# Patient Record
Sex: MALE | Race: WHITE | NOT HISPANIC OR LATINO | Employment: OTHER | URBAN - METROPOLITAN AREA
[De-identification: names, ages, dates, MRNs, and addresses within clinical notes are randomized per-mention and may not be internally consistent; named-entity substitution may affect disease eponyms.]

---

## 2021-01-10 ENCOUNTER — APPOINTMENT (OUTPATIENT)
Dept: GENERAL RADIOLOGY | Facility: HOSPITAL | Age: 66
End: 2021-01-10

## 2021-01-10 ENCOUNTER — APPOINTMENT (OUTPATIENT)
Dept: CT IMAGING | Facility: HOSPITAL | Age: 66
End: 2021-01-10

## 2021-01-10 ENCOUNTER — HOSPITAL ENCOUNTER (INPATIENT)
Facility: HOSPITAL | Age: 66
LOS: 5 days | Discharge: HOME HEALTH | End: 2021-01-15
Attending: EMERGENCY MEDICINE | Admitting: HOSPITALIST

## 2021-01-10 DIAGNOSIS — J90 PLEURAL EFFUSION: ICD-10-CM

## 2021-01-10 DIAGNOSIS — S72.001A CLOSED FRACTURE OF RIGHT HIP, INITIAL ENCOUNTER (HCC): ICD-10-CM

## 2021-01-10 DIAGNOSIS — F10.10 ALCOHOL ABUSE: ICD-10-CM

## 2021-01-10 DIAGNOSIS — S72.009A CLOSED FRACTURE OF HIP, UNSPECIFIED LATERALITY, INITIAL ENCOUNTER (HCC): Primary | ICD-10-CM

## 2021-01-10 LAB
ALBUMIN SERPL-MCNC: 3 G/DL (ref 3.5–5.2)
ALBUMIN/GLOB SERPL: 0.7 G/DL
ALP SERPL-CCNC: 114 U/L (ref 39–117)
ALT SERPL W P-5'-P-CCNC: 20 U/L (ref 1–41)
ANION GAP SERPL CALCULATED.3IONS-SCNC: 12 MMOL/L (ref 5–15)
APAP SERPL-MCNC: <5 MCG/ML (ref 0–30)
AST SERPL-CCNC: 51 U/L (ref 1–40)
BACTERIA UR QL AUTO: ABNORMAL /HPF
BASOPHILS # BLD AUTO: 0.1 10*3/MM3 (ref 0–0.2)
BASOPHILS NFR BLD AUTO: 0.8 % (ref 0–1.5)
BILIRUB SERPL-MCNC: 2.6 MG/DL (ref 0–1.2)
BILIRUB UR QL STRIP: NEGATIVE
BUN SERPL-MCNC: 7 MG/DL (ref 8–23)
BUN/CREAT SERPL: 14.9 (ref 7–25)
CALCIUM SPEC-SCNC: 8.4 MG/DL (ref 8.6–10.5)
CHLORIDE SERPL-SCNC: 100 MMOL/L (ref 98–107)
CLARITY UR: ABNORMAL
CO2 SERPL-SCNC: 22 MMOL/L (ref 22–29)
COLOR UR: ABNORMAL
CREAT SERPL-MCNC: 0.47 MG/DL (ref 0.76–1.27)
DEPRECATED RDW RBC AUTO: 53.8 FL (ref 37–54)
EOSINOPHIL # BLD AUTO: 0.1 10*3/MM3 (ref 0–0.4)
EOSINOPHIL NFR BLD AUTO: 1.6 % (ref 0.3–6.2)
ERYTHROCYTE [DISTWIDTH] IN BLOOD BY AUTOMATED COUNT: 15.1 % (ref 12.3–15.4)
ETHANOL UR QL: 0.1 %
GFR SERPL CREATININE-BSD FRML MDRD: >150 ML/MIN/1.73
GLOBULIN UR ELPH-MCNC: 4.5 GM/DL
GLUCOSE SERPL-MCNC: 110 MG/DL (ref 65–99)
GLUCOSE UR STRIP-MCNC: NEGATIVE MG/DL
HCT VFR BLD AUTO: 37.8 % (ref 37.5–51)
HGB BLD-MCNC: 12.8 G/DL (ref 13–17.7)
HGB UR QL STRIP.AUTO: ABNORMAL
HYALINE CASTS UR QL AUTO: ABNORMAL /LPF
KETONES UR QL STRIP: NEGATIVE
LEUKOCYTE ESTERASE UR QL STRIP.AUTO: NEGATIVE
LYMPHOCYTES # BLD AUTO: 0.7 10*3/MM3 (ref 0.7–3.1)
LYMPHOCYTES NFR BLD AUTO: 8.5 % (ref 19.6–45.3)
MCH RBC QN AUTO: 34.7 PG (ref 26.6–33)
MCHC RBC AUTO-ENTMCNC: 33.8 G/DL (ref 31.5–35.7)
MCV RBC AUTO: 102.8 FL (ref 79–97)
MONOCYTES # BLD AUTO: 0.8 10*3/MM3 (ref 0.1–0.9)
MONOCYTES NFR BLD AUTO: 9.5 % (ref 5–12)
MUCOUS THREADS URNS QL MICRO: ABNORMAL /HPF
NEUTROPHILS NFR BLD AUTO: 6.3 10*3/MM3 (ref 1.7–7)
NEUTROPHILS NFR BLD AUTO: 79.6 % (ref 42.7–76)
NITRITE UR QL STRIP: NEGATIVE
NRBC BLD AUTO-RTO: 0.1 /100 WBC (ref 0–0.2)
PH UR STRIP.AUTO: 6 [PH] (ref 5–8)
PLATELET # BLD AUTO: 201 10*3/MM3 (ref 140–450)
PMV BLD AUTO: 7 FL (ref 6–12)
POTASSIUM SERPL-SCNC: 3.5 MMOL/L (ref 3.5–5.2)
PROT SERPL-MCNC: 7.5 G/DL (ref 6–8.5)
PROT UR QL STRIP: NEGATIVE
RBC # BLD AUTO: 3.68 10*6/MM3 (ref 4.14–5.8)
RBC # UR: ABNORMAL /HPF
REF LAB TEST METHOD: ABNORMAL
SALICYLATES SERPL-MCNC: <0.3 MG/DL
SARS-COV-2 RNA PNL SPEC NAA+PROBE: NOT DETECTED
SODIUM SERPL-SCNC: 134 MMOL/L (ref 136–145)
SP GR UR STRIP: 1.01 (ref 1–1.03)
SQUAMOUS #/AREA URNS HPF: ABNORMAL /HPF
TROPONIN T SERPL-MCNC: <0.01 NG/ML (ref 0–0.03)
UROBILINOGEN UR QL STRIP: ABNORMAL
WBC # BLD AUTO: 7.9 10*3/MM3 (ref 3.4–10.8)
WBC UR QL AUTO: ABNORMAL /HPF

## 2021-01-10 PROCEDURE — 85025 COMPLETE CBC W/AUTO DIFF WBC: CPT | Performed by: EMERGENCY MEDICINE

## 2021-01-10 PROCEDURE — 81001 URINALYSIS AUTO W/SCOPE: CPT | Performed by: EMERGENCY MEDICINE

## 2021-01-10 PROCEDURE — 82077 ASSAY SPEC XCP UR&BREATH IA: CPT | Performed by: EMERGENCY MEDICINE

## 2021-01-10 PROCEDURE — 73502 X-RAY EXAM HIP UNI 2-3 VIEWS: CPT

## 2021-01-10 PROCEDURE — 74176 CT ABD & PELVIS W/O CONTRAST: CPT

## 2021-01-10 PROCEDURE — 99222 1ST HOSP IP/OBS MODERATE 55: CPT | Performed by: HOSPITALIST

## 2021-01-10 PROCEDURE — 80143 DRUG ASSAY ACETAMINOPHEN: CPT | Performed by: EMERGENCY MEDICINE

## 2021-01-10 PROCEDURE — 70450 CT HEAD/BRAIN W/O DYE: CPT

## 2021-01-10 PROCEDURE — 80179 DRUG ASSAY SALICYLATE: CPT | Performed by: EMERGENCY MEDICINE

## 2021-01-10 PROCEDURE — 71045 X-RAY EXAM CHEST 1 VIEW: CPT

## 2021-01-10 PROCEDURE — 80307 DRUG TEST PRSMV CHEM ANLYZR: CPT | Performed by: EMERGENCY MEDICINE

## 2021-01-10 PROCEDURE — 84484 ASSAY OF TROPONIN QUANT: CPT | Performed by: EMERGENCY MEDICINE

## 2021-01-10 PROCEDURE — U0003 INFECTIOUS AGENT DETECTION BY NUCLEIC ACID (DNA OR RNA); SEVERE ACUTE RESPIRATORY SYNDROME CORONAVIRUS 2 (SARS-COV-2) (CORONAVIRUS DISEASE [COVID-19]), AMPLIFIED PROBE TECHNIQUE, MAKING USE OF HIGH THROUGHPUT TECHNOLOGIES AS DESCRIBED BY CMS-2020-01-R: HCPCS | Performed by: EMERGENCY MEDICINE

## 2021-01-10 PROCEDURE — 80053 COMPREHEN METABOLIC PANEL: CPT | Performed by: EMERGENCY MEDICINE

## 2021-01-10 PROCEDURE — 99284 EMERGENCY DEPT VISIT MOD MDM: CPT

## 2021-01-10 PROCEDURE — 25010000002 HYDROMORPHONE PER 4 MG: Performed by: HOSPITALIST

## 2021-01-10 PROCEDURE — 72125 CT NECK SPINE W/O DYE: CPT

## 2021-01-10 RX ORDER — SODIUM CHLORIDE 0.9 % (FLUSH) 0.9 %
3-10 SYRINGE (ML) INJECTION AS NEEDED
Status: DISCONTINUED | OUTPATIENT
Start: 2021-01-10 | End: 2021-01-15 | Stop reason: HOSPADM

## 2021-01-10 RX ORDER — HYDROMORPHONE HCL 110MG/55ML
0.5 PATIENT CONTROLLED ANALGESIA SYRINGE INTRAVENOUS
Status: DISCONTINUED | OUTPATIENT
Start: 2021-01-10 | End: 2021-01-15 | Stop reason: HOSPADM

## 2021-01-10 RX ORDER — HYDROCODONE BITARTRATE AND ACETAMINOPHEN 10; 325 MG/1; MG/1
1 TABLET ORAL EVERY 4 HOURS PRN
Status: DISCONTINUED | OUTPATIENT
Start: 2021-01-10 | End: 2021-01-12 | Stop reason: SDUPTHER

## 2021-01-10 RX ORDER — SODIUM CHLORIDE 0.9 % (FLUSH) 0.9 %
3 SYRINGE (ML) INJECTION EVERY 12 HOURS SCHEDULED
Status: DISCONTINUED | OUTPATIENT
Start: 2021-01-10 | End: 2021-01-15 | Stop reason: HOSPADM

## 2021-01-10 RX ORDER — SODIUM CHLORIDE 0.9 % (FLUSH) 0.9 %
10 SYRINGE (ML) INJECTION AS NEEDED
Status: DISCONTINUED | OUTPATIENT
Start: 2021-01-10 | End: 2021-01-15 | Stop reason: HOSPADM

## 2021-01-10 RX ORDER — ONDANSETRON 2 MG/ML
4 INJECTION INTRAMUSCULAR; INTRAVENOUS EVERY 6 HOURS PRN
Status: DISCONTINUED | OUTPATIENT
Start: 2021-01-10 | End: 2021-01-15 | Stop reason: HOSPADM

## 2021-01-10 RX ORDER — ONDANSETRON 4 MG/1
4 TABLET, FILM COATED ORAL EVERY 6 HOURS PRN
Status: DISCONTINUED | OUTPATIENT
Start: 2021-01-10 | End: 2021-01-15 | Stop reason: HOSPADM

## 2021-01-10 RX ORDER — NITROGLYCERIN 0.4 MG/1
0.4 TABLET SUBLINGUAL
Status: DISCONTINUED | OUTPATIENT
Start: 2021-01-10 | End: 2021-01-15 | Stop reason: HOSPADM

## 2021-01-10 RX ADMIN — Medication 3 ML: at 20:02

## 2021-01-10 RX ADMIN — HYDROCODONE BITARTRATE AND ACETAMINOPHEN 1 TABLET: 10; 325 TABLET ORAL at 23:12

## 2021-01-10 RX ADMIN — HYDROMORPHONE HYDROCHLORIDE 0.5 MG: 2 INJECTION, SOLUTION INTRAMUSCULAR; INTRAVENOUS; SUBCUTANEOUS at 18:59

## 2021-01-10 NOTE — ED PROVIDER NOTES
"Subjective   Chief complaint: Patient is a pleasant 65-year-old.  He states he does not drink every day but did today he had a \"bucket of beer\".  He states he tripped over the air conditioning and landed on his hip.  States that he has back pain as well as hip pain however he \"always has back pain\".  Denies loss of consciousness.  Came in by ambulance.  Could not stand up at home.  Secondary to the pain in his hip.  He denies chest pain he denies shortness of breath.  He denies abdominal pain.    Context: Alcohol involved at home incident    Duration: Just prior to arrival    Timing: Persistent    Severity: Patient notes severe pain when he tries to move his head    Associated Symptoms: Negative except as noted above.  Appropriate PPE was used.        PCP:            Review of Systems   Constitutional: Negative.    HENT: Negative.    Eyes: Negative.    Respiratory: Negative.    Cardiovascular: Negative.    Gastrointestinal: Negative.    Genitourinary: Positive for flank pain.   Musculoskeletal: Positive for back pain.   Skin: Negative.    Neurological: Negative.    Psychiatric/Behavioral: Negative.        No past medical history on file.    Allergies   Allergen Reactions   • Sulfa Antibiotics Unknown - High Severity       No past surgical history on file.    No family history on file.             Objective   Physical Exam  Vitals signs and nursing note reviewed.   HENT:      Head: Normocephalic and atraumatic.   Eyes:      Extraocular Movements: Extraocular movements intact.      Pupils: Pupils are equal, round, and reactive to light.   Neck:      Comments: Alcohol on board.  Fails Nexus  Cardiovascular:      Rate and Rhythm: Normal rate and regular rhythm.      Pulses: Normal pulses.      Heart sounds: Normal heart sounds.   Abdominal:      Tenderness: There is abdominal tenderness.       Musculoskeletal:      Right hip: He exhibits decreased range of motion, tenderness and bony tenderness. He exhibits no swelling. "        Legs:    Skin:     General: Skin is warm and dry.   Neurological:      General: No focal deficit present.      Mental Status: He is oriented to person, place, and time.      Cranial Nerves: No cranial nerve deficit.      Sensory: No sensory deficit.      Motor: No weakness.   Psychiatric:         Mood and Affect: Mood normal.         Behavior: Behavior normal.         Thought Content: Thought content normal.         Judgment: Judgment normal.         Procedures           ED Course      Results for orders placed or performed during the hospital encounter of 01/10/21   Comprehensive Metabolic Panel    Specimen: Blood   Result Value Ref Range    Glucose 110 (H) 65 - 99 mg/dL    BUN 7 (L) 8 - 23 mg/dL    Creatinine 0.47 (L) 0.76 - 1.27 mg/dL    Sodium 134 (L) 136 - 145 mmol/L    Potassium 3.5 3.5 - 5.2 mmol/L    Chloride 100 98 - 107 mmol/L    CO2 22.0 22.0 - 29.0 mmol/L    Calcium 8.4 (L) 8.6 - 10.5 mg/dL    Total Protein 7.5 6.0 - 8.5 g/dL    Albumin 3.00 (L) 3.50 - 5.20 g/dL    ALT (SGPT) 20 1 - 41 U/L    AST (SGOT) 51 (H) 1 - 40 U/L    Alkaline Phosphatase 114 39 - 117 U/L    Total Bilirubin 2.6 (H) 0.0 - 1.2 mg/dL    eGFR Non African Amer >150 >60 mL/min/1.73    Globulin 4.5 gm/dL    A/G Ratio 0.7 g/dL    BUN/Creatinine Ratio 14.9 7.0 - 25.0    Anion Gap 12.0 5.0 - 15.0 mmol/L   Troponin    Specimen: Blood   Result Value Ref Range    Troponin T <0.010 0.000 - 0.030 ng/mL   Ethanol    Specimen: Blood   Result Value Ref Range    Ethanol % 0.099 %   Acetaminophen Level    Specimen: Blood   Result Value Ref Range    Acetaminophen <5.0 0.0 - 30.0 mcg/mL   Salicylate Level    Specimen: Blood   Result Value Ref Range    Salicylate <0.3 <=30.0 mg/dL   CBC Auto Differential    Specimen: Blood   Result Value Ref Range    WBC 7.90 3.40 - 10.80 10*3/mm3    RBC 3.68 (L) 4.14 - 5.80 10*6/mm3    Hemoglobin 12.8 (L) 13.0 - 17.7 g/dL    Hematocrit 37.8 37.5 - 51.0 %    .8 (H) 79.0 - 97.0 fL    MCH 34.7 (H) 26.6 - 33.0  pg    MCHC 33.8 31.5 - 35.7 g/dL    RDW 15.1 12.3 - 15.4 %    RDW-SD 53.8 37.0 - 54.0 fl    MPV 7.0 6.0 - 12.0 fL    Platelets 201 140 - 450 10*3/mm3    Neutrophil % 79.6 (H) 42.7 - 76.0 %    Lymphocyte % 8.5 (L) 19.6 - 45.3 %    Monocyte % 9.5 5.0 - 12.0 %    Eosinophil % 1.6 0.3 - 6.2 %    Basophil % 0.8 0.0 - 1.5 %    Neutrophils, Absolute 6.30 1.70 - 7.00 10*3/mm3    Lymphocytes, Absolute 0.70 0.70 - 3.10 10*3/mm3    Monocytes, Absolute 0.80 0.10 - 0.90 10*3/mm3    Eosinophils, Absolute 0.10 0.00 - 0.40 10*3/mm3    Basophils, Absolute 0.10 0.00 - 0.20 10*3/mm3    nRBC 0.1 0.0 - 0.2 /100 WBC              Ct Abdomen Pelvis Without Contrast    Result Date: 1/10/2021   1. Diffuse abdominal ascites is noted thought secondary to cirrhosis with secondary signs of splenomegaly and portal hypertension with prominent mesenteric vessels and varices suspected. In addition there is left-sided pleural effusion 2. Subcapital fracture of the right hip 3. Incidental note is made of gallstones within the gallbladder    Electronically Signed By-Gee Davison MD On:1/10/2021 3:49 PM This report was finalized on [id number] by  Gee Davison MD.    Ct Head Without Contrast    Result Date: 1/10/2021   1. There is no acute edema hemorrhage or obvious mass effect. 2. Chronic sinusitis is noted involving the left maxillary and ethmoid sinuses. 3. Calvarium is grossly intact.   Brain MRI is more sensitive to evaluate for acute or subacute infarcts and to evaluate for intracranial metastatic disease.  Electronically Signed By-Gee Davison MD On:1/10/2021 3:38 PM This report was finalized on [id number] by  Gee Davison MD.    Ct Cervical Spine Without Contrast    Result Date: 1/10/2021   1. No obvious fracture or subluxation 2. Multilevel degenerative disc and degenerative changes are identified. 3. Incidental note is made of left pleural effusion. The cause is not readily apparent on this exam  Electronically Signed  By-Gee Davison MD On:1/10/2021 3:43 PM This report was finalized on [id number] by  Gee Davison MD.                                 MDM  Number of Diagnoses or Management Options  Diagnosis management comments: Patient presents after a fall and alcohol use.  He has subcapital right hip fracture.  Cannot bear weight on it.  At this point time I will admit him for orthopedic evaluation and hip replacement surgery.  He will be admitted to medicine.  He would be high risk for withdrawal with his alcoholism.  He also has pleural effusion ascites to be worked up and managed in the hospital.       Amount and/or Complexity of Data Reviewed  Clinical lab tests: reviewed  Tests in the radiology section of CPT®: reviewed  Discussion of test results with the performing providers: yes  Discuss the patient with other providers: yes  Independent visualization of images, tracings, or specimens: yes    Risk of Complications, Morbidity, and/or Mortality  Presenting problems: high  Management options: high    Patient Progress  Patient progress: stable      Final diagnoses:   None     Right hip fracture  Alcohol abuse  Cirrhosis  Pleural effusion       Jemal Sousa DO  01/10/21 1606

## 2021-01-11 ENCOUNTER — APPOINTMENT (OUTPATIENT)
Dept: GENERAL RADIOLOGY | Facility: HOSPITAL | Age: 66
End: 2021-01-11

## 2021-01-11 LAB
ABO GROUP BLD: NORMAL
ANION GAP SERPL CALCULATED.3IONS-SCNC: 10 MMOL/L (ref 5–15)
APTT PPP: 38.8 SECONDS (ref 24–31)
BASOPHILS # BLD AUTO: 0.1 10*3/MM3 (ref 0–0.2)
BASOPHILS NFR BLD AUTO: 0.6 % (ref 0–1.5)
BLD GP AB SCN SERPL QL: NEGATIVE
BUN SERPL-MCNC: 8 MG/DL (ref 8–23)
BUN/CREAT SERPL: 20.5 (ref 7–25)
CALCIUM SPEC-SCNC: 8.3 MG/DL (ref 8.6–10.5)
CHLORIDE SERPL-SCNC: 102 MMOL/L (ref 98–107)
CO2 SERPL-SCNC: 23 MMOL/L (ref 22–29)
CREAT SERPL-MCNC: 0.39 MG/DL (ref 0.76–1.27)
DEPRECATED RDW RBC AUTO: 52.1 FL (ref 37–54)
EOSINOPHIL # BLD AUTO: 0.2 10*3/MM3 (ref 0–0.4)
EOSINOPHIL NFR BLD AUTO: 2.6 % (ref 0.3–6.2)
ERYTHROCYTE [DISTWIDTH] IN BLOOD BY AUTOMATED COUNT: 14.8 % (ref 12.3–15.4)
GFR SERPL CREATININE-BSD FRML MDRD: >150 ML/MIN/1.73
GLUCOSE SERPL-MCNC: 84 MG/DL (ref 65–99)
HCT VFR BLD AUTO: 34.3 % (ref 37.5–51)
HGB BLD-MCNC: 11.8 G/DL (ref 13–17.7)
INR PPP: 1.54 (ref 0.93–1.1)
LYMPHOCYTES # BLD AUTO: 0.6 10*3/MM3 (ref 0.7–3.1)
LYMPHOCYTES NFR BLD AUTO: 6.7 % (ref 19.6–45.3)
MCH RBC QN AUTO: 34.4 PG (ref 26.6–33)
MCHC RBC AUTO-ENTMCNC: 34.3 G/DL (ref 31.5–35.7)
MCV RBC AUTO: 100.3 FL (ref 79–97)
MONOCYTES # BLD AUTO: 0.8 10*3/MM3 (ref 0.1–0.9)
MONOCYTES NFR BLD AUTO: 8.1 % (ref 5–12)
MRSA DNA SPEC QL NAA+PROBE: NORMAL
NEUTROPHILS NFR BLD AUTO: 7.9 10*3/MM3 (ref 1.7–7)
NEUTROPHILS NFR BLD AUTO: 82 % (ref 42.7–76)
NRBC BLD AUTO-RTO: 0.1 /100 WBC (ref 0–0.2)
PLATELET # BLD AUTO: 172 10*3/MM3 (ref 140–450)
PMV BLD AUTO: 6.8 FL (ref 6–12)
POTASSIUM SERPL-SCNC: 3.8 MMOL/L (ref 3.5–5.2)
PROTHROMBIN TIME: 16.6 SECONDS (ref 9.6–11.7)
RBC # BLD AUTO: 3.42 10*6/MM3 (ref 4.14–5.8)
RH BLD: POSITIVE
SODIUM SERPL-SCNC: 135 MMOL/L (ref 136–145)
T&S EXPIRATION DATE: NORMAL
WBC # BLD AUTO: 9.6 10*3/MM3 (ref 3.4–10.8)

## 2021-01-11 PROCEDURE — 86850 RBC ANTIBODY SCREEN: CPT | Performed by: ORTHOPAEDIC SURGERY

## 2021-01-11 PROCEDURE — 80048 BASIC METABOLIC PNL TOTAL CA: CPT | Performed by: HOSPITALIST

## 2021-01-11 PROCEDURE — 99232 SBSQ HOSP IP/OBS MODERATE 35: CPT | Performed by: HOSPITALIST

## 2021-01-11 PROCEDURE — 93010 ELECTROCARDIOGRAM REPORT: CPT | Performed by: INTERNAL MEDICINE

## 2021-01-11 PROCEDURE — 93005 ELECTROCARDIOGRAM TRACING: CPT | Performed by: ORTHOPAEDIC SURGERY

## 2021-01-11 PROCEDURE — 99223 1ST HOSP IP/OBS HIGH 75: CPT | Performed by: INTERNAL MEDICINE

## 2021-01-11 PROCEDURE — 87641 MR-STAPH DNA AMP PROBE: CPT | Performed by: ORTHOPAEDIC SURGERY

## 2021-01-11 PROCEDURE — 85025 COMPLETE CBC W/AUTO DIFF WBC: CPT | Performed by: HOSPITALIST

## 2021-01-11 PROCEDURE — 85610 PROTHROMBIN TIME: CPT | Performed by: HOSPITALIST

## 2021-01-11 PROCEDURE — 25010000002 HYDROMORPHONE PER 4 MG: Performed by: HOSPITALIST

## 2021-01-11 PROCEDURE — 85730 THROMBOPLASTIN TIME PARTIAL: CPT | Performed by: HOSPITALIST

## 2021-01-11 PROCEDURE — 86900 BLOOD TYPING SEROLOGIC ABO: CPT | Performed by: ORTHOPAEDIC SURGERY

## 2021-01-11 PROCEDURE — 86901 BLOOD TYPING SEROLOGIC RH(D): CPT

## 2021-01-11 PROCEDURE — 86900 BLOOD TYPING SEROLOGIC ABO: CPT

## 2021-01-11 PROCEDURE — 71045 X-RAY EXAM CHEST 1 VIEW: CPT

## 2021-01-11 PROCEDURE — 86901 BLOOD TYPING SEROLOGIC RH(D): CPT | Performed by: ORTHOPAEDIC SURGERY

## 2021-01-11 RX ADMIN — Medication 3 ML: at 20:18

## 2021-01-11 RX ADMIN — Medication 3 ML: at 08:33

## 2021-01-11 RX ADMIN — HYDROMORPHONE HYDROCHLORIDE 0.5 MG: 2 INJECTION, SOLUTION INTRAMUSCULAR; INTRAVENOUS; SUBCUTANEOUS at 18:11

## 2021-01-11 RX ADMIN — HYDROMORPHONE HYDROCHLORIDE 0.5 MG: 2 INJECTION, SOLUTION INTRAMUSCULAR; INTRAVENOUS; SUBCUTANEOUS at 11:02

## 2021-01-11 RX ADMIN — HYDROMORPHONE HYDROCHLORIDE 0.5 MG: 2 INJECTION, SOLUTION INTRAMUSCULAR; INTRAVENOUS; SUBCUTANEOUS at 14:34

## 2021-01-11 NOTE — PAT
Spoke with nurse on unit, Polina, informed of surgery date and time.  Patient needs an EKG, CXR, MRSA, and T&S.  Is an [facility] patient.

## 2021-01-11 NOTE — DISCHARGE PLACEMENT REQUEST
"Bereket Varner (65 y.o. Male)     Date of Birth Social Security Number Address Home Phone MRN    1955  [address] [address] [phone] [id number]    Denominational Marital Status          None Single       Admission Date Admission Type Admitting Provider Attending Provider Department, Room/Bed    1/10/21 Emergency Morales, MD Franck Morrissey Salgram, MD [facility] 3C MEDICAL INPATIENT, 355/1    Discharge Date Discharge Disposition Discharge Destination                       Attending Provider: Trevor Juárez MD    Allergies: Sulfa Antibiotics    Isolation: None   Infection: None   Code Status: CPR    Ht: 182.9 cm (72\")   Wt: 81.6 kg (179 lb 14.3 oz)    Admission Cmt: None   Principal Problem: Closed fracture of hip (CMS/[facility]) [S72.009A]                 Active Insurance as of 1/10/2021     Primary Coverage     Payor Plan Insurance Group Employer/Plan Group    MEDICARE MEDICARE A & B      Payor Plan Address Payor Plan Phone Number Payor Plan Fax Number Effective Dates    [address] [phone]  1/1/2019 - None Entered    [facility] [id number]       Subscriber Name Subscriber Birth Date Member ID       BEREKET VARNER 1955 [id number]                 Emergency Contacts      (Rel.) Home Phone Work Phone Mobile Phone    LIO NEUMANN (Daughter) [phone] -- [phone]              "

## 2021-01-11 NOTE — PLAN OF CARE
Goal Outcome Evaluation:  Plan of Care Reviewed With: patient  Progress: no change  Outcome Summary: Patient has rested throughout shift. Patient NPO since midnight for sugnorris today. Patients experienced no acute events during shift. Will continue to monitor.

## 2021-01-11 NOTE — PLAN OF CARE
Goal Outcome Evaluation:  Plan of Care Reviewed With: patient  Progress: no change  Outcome Summary: pt has had c/o pain today- see MAR for interventions. surgery tomorrow evening. no other complaints today. will continue to monitor

## 2021-01-11 NOTE — H&P
[facility] Medicine Services      Patient Name: Bereket Mckay  : 1955  MRN: [id number]  Primary Care Physician: Yair, No Known  Date of admission: 1/10/2021    Patient Care Team:  Provider, No Known as PCP - General          Subjective   History Present Illness     Chief Complaint:   Chief Complaint   Patient presents with   • Fall   right hip pain      History of present illness:  Mr. Mckay is a 65 y.o.  presents to [facility] complaining of right hip pain and on going chronic back pain s/p fall. XR in the ER revealed subcapital right hip fracture.  Patient was admitted and orthopedic surgery was consulted.       ROS   12 point review of systems was reviewed and was negative except as above.        Personal History     Past Medical History: History reviewed. No pertinent past medical history.    Surgical History:    History reviewed. No pertinent surgical history.        Family History: Mother had breast cancer and father had unknown type cancer. Otherwise pertinent FHx was reviewed and unremarkable.     Social History:  reports that he quit smoking about 11 years ago. His smokeless tobacco use includes chew. He reports current alcohol use of about 5.0 standard drinks of alcohol per week. He reports that he does not use drugs.  Pt lives alone.    Medications:  Prior to Admission medications    Not on File       Allergies:    Allergies   Allergen Reactions   • Sulfa Antibiotics Unknown - High Severity       Objective   Objective     Vital Signs  Temp:  [96.2 °F (35.7 °C)-97.5 °F (36.4 °C)] 97.5 °F (36.4 °C)  Heart Rate:  [78-95] 90  Resp:  [18] 18  BP: (124-154)/(67-87) 154/87  SpO2:  [91 %-94 %] 92 %  on   ;   Device (Oxygen Therapy): room air  Body mass index is 24.76 kg/m².    Physical Exam  Well-developed well-nourished gentleman in no acute distress sitting up in bed awake and alert; mucous membranes moist; sclerae anicteric; lungs clear to auscultation bilaterally; CV  regular rate and rhythm; abdomen soft nontender nondistended with active bowel sounds; extremities with no edema, cyanosis or calf tenderness; palpable pedal pulses bilaterally; no De La Garza catheter.    Results Review:  I have personally reviewed most recent lab results and radiology images and interpretations.    Results from last 7 days   Lab Units 01/10/21  1438   WBC 10*3/mm3 7.90   HEMOGLOBIN g/dL 12.8*   HEMATOCRIT % 37.8   PLATELETS 10*3/mm3 201     Results from last 7 days   Lab Units 01/10/21  1438   SODIUM mmol/L 134*   POTASSIUM mmol/L 3.5   CHLORIDE mmol/L 100   CO2 mmol/L 22.0   BUN mg/dL 7*   CREATININE mg/dL 0.47*   GLUCOSE mg/dL 110*   CALCIUM mg/dL 8.4*   ALT (SGPT) U/L 20   AST (SGOT) U/L 51*   TROPONIN T ng/mL <0.010     Estimated Creatinine Clearance: 106.4 mL/min (A) (by C-G formula based on SCr of 0.47 mg/dL (L)).  Brief Urine Lab Results  (Last result in the past 365 days)      Color   Clarity   Blood   Leuk Est   Nitrite   Protein   CREAT   Urine HCG        01/10/21 1633 Dark Yellow Hazy Small (1+) Negative Negative Negative               Microbiology Results (last 10 days)     Procedure Component Value - Date/Time    COVID PRE-OP / PRE-PROCEDURE SCREENING ORDER (NO ISOLATION) - Swab, Nasopharynx [[id number]]  (Normal) Collected: 01/10/21 1634    Lab Status: Final result Specimen: Swab from Nasopharynx Updated: 01/10/21 1729    Narrative:      The following orders were created for panel order COVID PRE-OP / PRE-PROCEDURE SCREENING ORDER (NO ISOLATION) - Swab, Nasopharynx.  Procedure                               Abnormality         Status                     ---------                               -----------         ------                     COVID-19,CEPHEID,COR/ELLIE...[[id number]]  Normal              Final result                 Please view results for these tests on the individual orders.    COVID-19,CEPHEID,COR/ELLIE/PAD IN-HOUSE(OR EMERGENT/ADD-ON),NP SWAB IN TRANSPORT MEDIA 3-4 HR TAT - Swab,  Nasopharynx [[id number]]  (Normal) Collected: 01/10/21 1634    Lab Status: Final result Specimen: Swab from Nasopharynx Updated: 01/10/21 1729     COVID19 Not Detected    Narrative:      Fact sheet for providers: https://www.fda.gov/media/905769/download     Fact sheet for patients: https://www.fda.gov/media/809364/download          ECG/EMG Results (most recent)     None                    Ct Abdomen Pelvis Without Contrast    Result Date: 1/10/2021   1. Diffuse abdominal ascites is noted thought secondary to cirrhosis with secondary signs of splenomegaly and portal hypertension with prominent mesenteric vessels and varices suspected. In addition there is left-sided pleural effusion 2. Subcapital fracture of the right hip 3. Incidental note is made of gallstones within the gallbladder    Electronically Signed By-Gee Davison MD On:1/10/2021 3:49 PM This report was finalized on [id number] by  Gee Davison MD.    Ct Head Without Contrast    Result Date: 1/10/2021   1. There is no acute edema hemorrhage or obvious mass effect. 2. Chronic sinusitis is noted involving the left maxillary and ethmoid sinuses. 3. Calvarium is grossly intact.   Brain MRI is more sensitive to evaluate for acute or subacute infarcts and to evaluate for intracranial metastatic disease.  Electronically Signed By-Gee Davison MD On:1/10/2021 3:38 PM This report was finalized on [id number] by  Gee Davison MD.    Ct Cervical Spine Without Contrast    Result Date: 1/10/2021   1. No obvious fracture or subluxation 2. Multilevel degenerative disc and degenerative changes are identified. 3. Incidental note is made of left pleural effusion. The cause is not readily apparent on this exam  Electronically Signed By-Gee Davison MD On:1/10/2021 3:43 PM This report was finalized on [id number] by  Gee Davison MD.    Xr Chest 1 View    Result Date: 1/10/2021   1. There is a moderate left-sided pleural effusion with  underlying chronic changes.   Electronically Signed By-Gee Davison MD On:1/10/2021 4:47 PM This report was finalized on [id number] by  Gee Davison MD.    Xr Hip With Or Without Pelvis 2 - 3 View Right    Result Date: 1/10/2021   1. Minimally displaced subcapital fracture the right hip is noted.  Electronically Signed By-Gee Davison MD On:1/10/2021 4:47 PM This report was finalized on [id number] by  Gee Davison MD.        Estimated Creatinine Clearance: 106.4 mL/min (A) (by C-G formula based on SCr of 0.47 mg/dL (L)).    Assessment/Plan   Assessment/Plan       Active Hospital Problems    Diagnosis  POA   • Closed fracture of hip (CMS/HCC) [S72.009A]  Yes      Resolved Hospital Problems   No resolved problems to display.       Assessment and plan:    Minimally displaced subcapsular fracture of the right hip status post mechanical fall  -Analgesics ordered  -Orthopedic surgery consulting    EtOH dependence  -follow CIWA protocol prn w/d symptoms    Incidental findings of cirrhosis, portal hypertension, gallstones, splenomegaly and ascites     Hyponatremia, mild, monitor      VTE Prophylaxis -   Mechanical Order History:      Ordered        01/10/21 1935  Place Venous Foot Pump  Once         01/10/21 1935  Maintain Venous Foot Pump  Once                 Pharmalogical Order History:     None          CODE STATUS:    Code Status and Medical Interventions:   Ordered at: 01/10/21 1812     Code Status:    CPR     Medical Interventions (Level of Support Prior to Arrest):    Full       This patient has been examined wearing appropriate Personal Protective Equipment. 01/10/21      I discussed the patient's findings and my recommendations with patient.      Signature: Electronically signed by Patricia Morales MD, 01/10/21, 11:50 PM JU.      [facility]ist Team

## 2021-01-11 NOTE — PROGRESS NOTES
"I was asked to see this patient for my partner Dr. Luz. Patient with R femoral neck fracture. Surgery likely to be Tuesday evening, final plan to be determined tomorrow AM.     R \"Elie\" Rogelio BUCK MD  Orthopaedic Surgery  [facility]  [phone] - [place] Office  [phone] - [place] Office    "

## 2021-01-11 NOTE — PROGRESS NOTES
"      [facility] Daily Progress Note      Hospitalist Team  LOS 1 days      Patient Care Team:  Provider, No Known as PCP - General    Patient Location: 355/1      Subjective   Subjective   Denies for any new complaint, no nausea or vomiting, no chest pain.  Chief Complaint / Subjective  Chief Complaint   Patient presents with   • Fall         Brief Synopsis of Hospital Course/HPI    Mr. Mckay is a 65 y.o.  presents to [facility] complaining of right hip pain and on going chronic back pain s/p fall. XR in the ER revealed subcapital right hip fracture.  Patient was admitted and orthopedic surgery was consulted.    Date::          ROS      Objective   Objective      Vital Signs  Temp:  [97.5 °F (36.4 °C)-98 °F (36.7 °C)] 97.8 °F (36.6 °C)  Heart Rate:  [78-96] 96  Resp:  [15-18] 16  BP: (124-154)/(67-87) 134/81  Oxygen Therapy  SpO2: 90 %  Pulse Oximetry Type: Intermittent  Device (Oxygen Therapy): room air  Flowsheet Rows      First Filed Value   Admission Height  182.9 cm (72\") Documented at 01/10/2021 1341   Admission Weight  77.1 kg (170 lb) Documented at 01/10/2021 1341        Intake & Output (last 3 days)       01/08 0701 - 01/09 0700 01/09 0701 - 01/10 0700 01/10 0701 - 01/11 0700 01/11 0701 - 01/12 0700    Urine (mL/kg/hr)   550     Total Output   550     Net   -550                 Lines, Drains & Airways    Active LDAs     Name:   Placement date:   Placement time:   Site:   Days:    Peripheral IV 01/10/21 1437 Right Forearm   01/10/21    1437    Forearm   1                  Physical Exam:    Physical Exam  Vitals signs and nursing note reviewed.   Constitutional:       General: He is not in acute distress.     Appearance: Normal appearance. He is well-developed. He is not ill-appearing, toxic-appearing or diaphoretic.   HENT:      Head: Normocephalic and atraumatic.      Right Ear: Ear canal and external ear normal.      Left Ear: Ear canal and external ear " normal.      Nose: Nose normal. No congestion or rhinorrhea.      Mouth/Throat:      Mouth: Mucous membranes are moist.      Pharynx: No oropharyngeal exudate.   Eyes:      General: No scleral icterus.        Right eye: No discharge.         Left eye: No discharge.      Extraocular Movements: Extraocular movements intact.      Conjunctiva/sclera: Conjunctivae normal.      Pupils: Pupils are equal, round, and reactive to light.   Neck:      Musculoskeletal: Normal range of motion and neck supple. No neck rigidity or muscular tenderness.      Thyroid: No thyromegaly.      Vascular: No carotid bruit or JVD.      Trachea: No tracheal deviation.   Cardiovascular:      Rate and Rhythm: Normal rate and regular rhythm.      Pulses: Normal pulses.      Heart sounds: Normal heart sounds. No murmur. No friction rub. No gallop.    Pulmonary:      Effort: Pulmonary effort is normal. No respiratory distress.      Breath sounds: Normal breath sounds. No stridor. No wheezing, rhonchi or rales.   Chest:      Chest wall: No tenderness.   Abdominal:      General: Bowel sounds are normal. There is no distension.      Palpations: Abdomen is soft. There is no mass.      Tenderness: There is no abdominal tenderness. There is no guarding or rebound.      Hernia: No hernia is present.   Musculoskeletal: Normal range of motion.         General: No swelling, tenderness, deformity or signs of injury.      Right lower leg: No edema.      Left lower leg: No edema.   Lymphadenopathy:      Cervical: No cervical adenopathy.   Skin:     General: Skin is warm and dry.      Coloration: Skin is not jaundiced or pale.      Findings: No bruising, erythema or rash.   Neurological:      General: No focal deficit present.      Mental Status: He is alert and oriented to person, place, and time. Mental status is at baseline.      Cranial Nerves: No cranial nerve deficit.      Sensory: No sensory deficit.      Motor: No weakness or abnormal muscle tone.       Coordination: Coordination normal.   Psychiatric:         Mood and Affect: Mood normal.         Behavior: Behavior normal.         Thought Content: Thought content normal.         Judgment: Judgment normal.              Wounds (last 24 hours)      LDA Wound     Row Name 01/11/21 0825 01/10/21 2302 01/1955       Wound 01/1955 Right gluteal    Wound - Properties Group Placement Date: 01/10/21  -CS Placement Time: 1954  -CS Present on Hospital Admission: Y  -CS Side: Right  -CS Location: gluteal  -CS Stage, Pressure Injury : unstageable  -CS    Wound Image  --  Images linked: 1  -CS  --    Periwound Temperature  warm  -PG  --  warm  -CS    Periwound Skin Turgor  soft  -PG  --  soft  -CS    Drainage Amount  none  -PG  --  none  -CS    Retired Wound - Properties Group Date first assessed: 01/10/21  -CS Time first assessed: 1954  -CS Present on Hospital Admission: Y  -CS Side: Right  -CS Location: gluteal  -CS      User Key  (r) = Recorded By, (t) = Taken By, (c) = Cosigned By    Initials Name Provider Type    CS Johny Givens RN Registered Nurse    PG Beverley Campbell LPN Licensed Nurse          Procedures:    Procedure(s):  TOTAL HIP ARTHROPLASTY ANTERIOR WITH HANA TABLE          Results Review:     I reviewed the patient's new clinical results.      Lab Results (last 24 hours)     Procedure Component Value Units Date/Time    Basic Metabolic Panel [[id number]]  (Abnormal) Collected: 01/11/21 0333    Specimen: Blood Updated: 01/11/21 0605     Glucose 84 mg/dL      BUN 8 mg/dL      Creatinine 0.39 mg/dL      Sodium 135 mmol/L      Potassium 3.8 mmol/L      Chloride 102 mmol/L      CO2 23.0 mmol/L      Calcium 8.3 mg/dL      eGFR Non African Amer >150 mL/min/1.73      BUN/Creatinine Ratio 20.5     Anion Gap 10.0 mmol/L     Narrative:      GFR Normal >60  Chronic Kidney Disease <60  Kidney Failure <15      Protime-INR [[id number]]  (Abnormal) Collected: 01/11/21 0333    Specimen: Blood Updated: 01/11/21 0539      Protime 16.6 Seconds      INR 1.54    aPTT [[id number]]  (Abnormal) Collected: 01/11/21 0333    Specimen: Blood Updated: 01/11/21 0539     PTT 38.8 seconds     CBC Auto Differential [[id number]]  (Abnormal) Collected: 01/11/21 0334    Specimen: Blood Updated: 01/11/21 0527     WBC 9.60 10*3/mm3      RBC 3.42 10*6/mm3      Hemoglobin 11.8 g/dL      Hematocrit 34.3 %      .3 fL      MCH 34.4 pg      MCHC 34.3 g/dL      RDW 14.8 %      RDW-SD 52.1 fl      MPV 6.8 fL      Platelets 172 10*3/mm3      Neutrophil % 82.0 %      Lymphocyte % 6.7 %      Monocyte % 8.1 %      Eosinophil % 2.6 %      Basophil % 0.6 %      Neutrophils, Absolute 7.90 10*3/mm3      Lymphocytes, Absolute 0.60 10*3/mm3      Monocytes, Absolute 0.80 10*3/mm3      Eosinophils, Absolute 0.20 10*3/mm3      Basophils, Absolute 0.10 10*3/mm3      nRBC 0.1 /100 WBC     COVID PRE-OP / PRE-PROCEDURE SCREENING ORDER (NO ISOLATION) - Swab, Nasopharynx [[id number]]  (Normal) Collected: 01/10/21 1634    Specimen: Swab from Nasopharynx Updated: 01/10/21 1729    Narrative:      The following orders were created for panel order COVID PRE-OP / PRE-PROCEDURE SCREENING ORDER (NO ISOLATION) - Swab, Nasopharynx.  Procedure                               Abnormality         Status                     ---------                               -----------         ------                     COVID-19,CEPHEID,COR/ELLIE...[[id number]]  Normal              Final result                 Please view results for these tests on the individual orders.    COVID-19,CEPHEID,COR/ELLIE/PAD IN-HOUSE(OR EMERGENT/ADD-ON),NP SWAB IN TRANSPORT MEDIA 3-4 HR TAT - Swab, Nasopharynx [[id number]]  (Normal) Collected: 01/10/21 1634    Specimen: Swab from Nasopharynx Updated: 01/10/21 1729     COVID19 Not Detected    Narrative:      Fact sheet for providers: https://www.fda.gov/media/355920/download     Fact sheet for patients: https://www.fda.gov/media/529696/download    Urinalysis, Microscopic Only  - Urine, Clean Catch [[id number]]  (Abnormal) Collected: 01/10/21 1633    Specimen: Urine, Clean Catch Updated: 01/10/21 1708     RBC, UA 3-5 /HPF      WBC, UA 0-2 /HPF      Bacteria, UA None Seen /HPF      Squamous Epithelial Cells, UA 0-2 /HPF      Hyaline Casts, UA None Seen /LPF      Mucus, UA Moderate/2+ /HPF      Methodology Manual Light Microscopy    Urinalysis With Microscopic If Indicated (No Culture) - Urine, Clean Catch [[id number]]  (Abnormal) Collected: 01/10/21 1633    Specimen: Urine, Clean Catch Updated: 01/10/21 1648     Color, UA Dark Yellow     Appearance, UA Hazy     pH, UA 6.0     Specific Gravity, UA 1.009     Glucose, UA Negative     Ketones, UA Negative     Bilirubin, UA Negative     Blood, UA Small (1+)     Protein, UA Negative     Leuk Esterase, UA Negative     Nitrite, UA Negative     Urobilinogen, UA 1.0 E.U./dL    Comprehensive Metabolic Panel [[id number]]  (Abnormal) Collected: 01/10/21 1438    Specimen: Blood Updated: 01/10/21 1517     Glucose 110 mg/dL      BUN 7 mg/dL      Creatinine 0.47 mg/dL      Sodium 134 mmol/L      Potassium 3.5 mmol/L      Comment: Slight hemolysis detected by analyzer. Results may be affected.        Chloride 100 mmol/L      CO2 22.0 mmol/L      Calcium 8.4 mg/dL      Total Protein 7.5 g/dL      Albumin 3.00 g/dL      ALT (SGPT) 20 U/L      AST (SGOT) 51 U/L      Comment: Slight hemolysis detected by analyzer. Results may be affected.        Alkaline Phosphatase 114 U/L      Total Bilirubin 2.6 mg/dL      eGFR Non African Amer >150 mL/min/1.73      Globulin 4.5 gm/dL      A/G Ratio 0.7 g/dL      BUN/Creatinine Ratio 14.9     Anion Gap 12.0 mmol/L     Narrative:      GFR Normal >60  Chronic Kidney Disease <60  Kidney Failure <15      Salicylate Level [[id number]]  (Normal) Collected: 01/10/21 1438    Specimen: Blood Updated: 01/10/21 1513     Salicylate <0.3 mg/dL     Troponin [[id number]]  (Normal) Collected: 01/10/21 1438    Specimen: Blood Updated: 01/10/21  1513     Troponin T <0.010 ng/mL     Narrative:      Troponin T Reference Range:  <= 0.03 ng/mL-   Negative for AMI  >0.03 ng/mL-     Abnormal for myocardial necrosis.  Clinicians would have to utilize clinical acumen, EKG, Troponin and serial changes to determine if it is an Acute Myocardial Infarction or myocardial injury due to an underlying chronic condition.       Results may be falsely decreased if patient taking Biotin.      Ethanol [[id number]] Collected: 01/10/21 1438    Specimen: Blood Updated: 01/10/21 1513     Ethanol % 0.099 %     Narrative:      Plasma Ethanol Clinical Symptoms:    ETOH (%)               Clinical Symptom  .01-.05              No apparent influence  .03-.12              Euphoria, Diminished judgment and attention   .09-.25              Impaired comprehension, Muscle incoordination  .18-.30              Confusion, Staggered gait, Slurred speech  .25-.40              Markedly decreased response to stimuli, unable to stand or                        walk, vomitting, sleep or stupor  .35-.50              Comatose, Anesthesia, Subnormal body temperature        Acetaminophen Level [[id number]]  (Normal) Collected: 01/10/21 1438    Specimen: Blood Updated: 01/10/21 1513     Acetaminophen <5.0 mcg/mL     Narrative:      Acetaminophen Therapeutic Range  5-20 ug/mL      Hours after ingestion            Toxic Value    4 Hours                           150 ug/mL    8 Hours                            70 ug/mL   12 Hours                            40 ug/mL   16 Hours                            20 ug/mL    These values apply to a single ingestion only.         No results found for: HGBA1C  Results from last 7 days   Lab Units 01/11/21  0333   INR  1.54*           No results found for: LIPASE  No results found for: CHOL, CHLPL, TRIG, HDL, LDL, LDLDIRECT    No results found for: INTRAOP, PREDX, FINALDX, COMDX    Microbiology Results (last 10 days)     Procedure Component Value - Date/Time    COVID PRE-OP /  PRE-PROCEDURE SCREENING ORDER (NO ISOLATION) - Swab, Nasopharynx [[id number]]  (Normal) Collected: 01/10/21 1634    Lab Status: Final result Specimen: Swab from Nasopharynx Updated: 01/10/21 1729    Narrative:      The following orders were created for panel order COVID PRE-OP / PRE-PROCEDURE SCREENING ORDER (NO ISOLATION) - Swab, Nasopharynx.  Procedure                               Abnormality         Status                     ---------                               -----------         ------                     COVID-19,CEPHEID,COR/ELLIE...[[id number]]  Normal              Final result                 Please view results for these tests on the individual orders.    COVID-19,CEPHEID,COR/ELLIE/PAD IN-HOUSE(OR EMERGENT/ADD-ON),NP SWAB IN TRANSPORT MEDIA 3-4 HR TAT - Swab, Nasopharynx [[id number]]  (Normal) Collected: 01/10/21 1634    Lab Status: Final result Specimen: Swab from Nasopharynx Updated: 01/10/21 1729     COVID19 Not Detected    Narrative:      Fact sheet for providers: https://www.fda.gov/media/108010/download     Fact sheet for patients: https://www.fda.gov/media/071891/download          ECG/EMG Results (most recent)     None                    Ct Abdomen Pelvis Without Contrast    Result Date: 1/10/2021   1. Diffuse abdominal ascites is noted thought secondary to cirrhosis with secondary signs of splenomegaly and portal hypertension with prominent mesenteric vessels and varices suspected. In addition there is left-sided pleural effusion 2. Subcapital fracture of the right hip 3. Incidental note is made of gallstones within the gallbladder    Electronically Signed By-Gee Davison MD On:1/10/2021 3:49 PM This report was finalized on [id number] by  Gee Davison MD.    Ct Head Without Contrast    Result Date: 1/10/2021   1. There is no acute edema hemorrhage or obvious mass effect. 2. Chronic sinusitis is noted involving the left maxillary and ethmoid sinuses. 3. Calvarium is grossly intact.   Brain  MRI is more sensitive to evaluate for acute or subacute infarcts and to evaluate for intracranial metastatic disease.  Electronically Signed By-Gee Davison MD On:1/10/2021 3:38 PM This report was finalized on [id number] by  Gee Davison MD.    Ct Cervical Spine Without Contrast    Result Date: 1/10/2021   1. No obvious fracture or subluxation 2. Multilevel degenerative disc and degenerative changes are identified. 3. Incidental note is made of left pleural effusion. The cause is not readily apparent on this exam  Electronically Signed By-Gee Davison MD On:1/10/2021 3:43 PM This report was finalized on [id number] by  Gee Davison MD.    Xr Chest 1 View    Result Date: 1/11/2021  Moderate size layering left pleural fluid collection with left basilar airspace disease favoring atelectasis. There is mild diffuse pulmonary edema  Electronically Signed By-Phuc Diaz MD On:1/11/2021 2:31 PM This report was finalized on [id number] by  Phuc Diaz MD.    Xr Chest 1 View    Result Date: 1/10/2021   1. There is a moderate left-sided pleural effusion with underlying chronic changes.   Electronically Signed By-Gee Davison MD On:1/10/2021 4:47 PM This report was finalized on [id number] by  Gee Davison MD.    Xr Hip With Or Without Pelvis 2 - 3 View Right    Result Date: 1/10/2021   1. Minimally displaced subcapital fracture the right hip is noted.  Electronically Signed By-Gee Davison MD On:1/10/2021 4:47 PM This report was finalized on [id number] by  Gee Davison MD.          Xrays, labs reviewed personally by physician.    Medication Review:   I have reviewed the patient's current medication list      Scheduled Meds  sodium chloride, 3 mL, Intravenous, Q12H        Meds Infusions       Meds PRN  •  HYDROcodone-acetaminophen  •  HYDROmorphone  •  nitroglycerin  •  ondansetron **OR** ondansetron  •  [COMPLETED] Insert peripheral IV **AND** sodium chloride  •  sodium  chloride        Assessment/Plan   Assessment/Plan     Active Hospital Problems    Diagnosis  POA   • **Closed fracture of hip (CMS/HCC) [S72.009A]  Yes      Resolved Hospital Problems   No resolved problems to display.       MEDICAL DECISION MAKING COMPLEXITY BY PROBLEM:     Assessment and plan:     Minimally displaced subcapsular fracture of the right hip status post mechanical fall  -Analgesics ordered  -Orthopedic surgery consulted  - Plan for surgery tomorrow noted.     EtOH dependence  -follow CIWA protocol prn w/d symptoms     Incidental findings of cirrhosis, portal hypertension, gallstones, splenomegaly and ascites      Hyponatremia, mild, monitor        VTE Prophylaxis -     VTE Prophylaxis -   Mechanical Order History:      Ordered        01/10/21 1935  Place Venous Foot Pump  Once         01/10/21 1935  Maintain Venous Foot Pump  Once                 Pharmalogical Order History:     None            Code Status -   Code Status and Medical Interventions:   Ordered at: 01/10/21 1812     Code Status:    CPR     Medical Interventions (Level of Support Prior to Arrest):    Full       This patient has been examined wearing appropriate Personal Protective Equipment and discussed with hospital infection control department. 01/11/21        Discharge Planning          Electronically signed by Trevor Juárez MD, 01/11/21, 14:49 EST.  [facility]ist Team

## 2021-01-11 NOTE — CONSULTS
"  Orthopaedic Surgery  Hip Fracture Consult Note  [phone]  Jennifer Tafoya TAYE    HPI:  Patient is a 65 y.o. white male who presents with hip pain after a fall from standing.  They presented to the ER for further workup where a right Femoral Neck Fracture was found. I was consulted for further management.     MEDICAL HISTORY  · History reviewed. No pertinent past medical history.  · History reviewed. No pertinent surgical history.  Prior to Admission medications    Not on File   ·   Allergies   Allergen Reactions   • Sulfa Antibiotics Unknown - High Severity   ·   ·   · There is no immunization history on file for this patient.  Social History     Tobacco Use   • Smoking status: Former Smoker     Quit date:      Years since quittin.0   • Smokeless tobacco: Current User     Types: Chew   Substance Use Topics   • Alcohol use: Yes     Alcohol/week: 5.0 standard drinks     Types: 5 Cans of beer per week     Comment: drinks 5 beers every     ·    Social History     Substance and Sexual Activity   Drug Use Never   ·     VITALS: /72 (BP Location: Right arm, Patient Position: Lying)   Pulse 92   Temp 98 °F (36.7 °C) (Oral)   Resp 15   Ht 182.9 cm (72\")   Wt 81.6 kg (179 lb 14.3 oz)   SpO2 92%   BMI 24.40 kg/m²  Body mass index is 24.4 kg/m².    PHYSICAL EXAM:   · CONSTITUTIONAL: No acute distress  · LUNGS: Equal chest rise, no shortness of air  · CARDIOVASCULAR: palpable peripheral pulses  · SKIN: no skin lesions in the area examined  · LYMPH: no lymphadenopathy in the area examined  · Right Lower Extremity  · Tenderness to Palpation: Tenderness to palpation at the hip  · Pulses:  Palpable DP/PT pulses  · Sensation: Sensation intact to light touch to saphenous/sural/deep peroneal/superficial peroneal/tibial nerves  · Motor: 5 out of 5 EHL/FHL/TA/GS motor complexes  · Range of Motion: Range of motion of hip deferred secondary to pain.  Positive pain with passive leg roll    RADIOLOGY REVIEW: "   Ct Abdomen Pelvis Without Contrast    Result Date: 1/10/2021   1. Diffuse abdominal ascites is noted thought secondary to cirrhosis with secondary signs of splenomegaly and portal hypertension with prominent mesenteric vessels and varices suspected. In addition there is left-sided pleural effusion 2. Subcapital fracture of the right hip 3. Incidental note is made of gallstones within the gallbladder    Electronically Signed By-Gee Davison MD On:1/10/2021 3:49 PM This report was finalized on [id number] by  Gee Davison MD.    Ct Head Without Contrast    Result Date: 1/10/2021   1. There is no acute edema hemorrhage or obvious mass effect. 2. Chronic sinusitis is noted involving the left maxillary and ethmoid sinuses. 3. Calvarium is grossly intact.   Brain MRI is more sensitive to evaluate for acute or subacute infarcts and to evaluate for intracranial metastatic disease.  Electronically Signed By-Gee Davison MD On:1/10/2021 3:38 PM This report was finalized on [id number] by  Gee Davison MD.    Ct Cervical Spine Without Contrast    Result Date: 1/10/2021   1. No obvious fracture or subluxation 2. Multilevel degenerative disc and degenerative changes are identified. 3. Incidental note is made of left pleural effusion. The cause is not readily apparent on this exam  Electronically Signed By-Gee Davison MD On:1/10/2021 3:43 PM This report was finalized on [id number] by  Gee Davison MD.    Xr Chest 1 View    Result Date: 1/10/2021   1. There is a moderate left-sided pleural effusion with underlying chronic changes.   Electronically Signed By-Gee Davison MD On:1/10/2021 4:47 PM This report was finalized on [id number] by  Gee Davison MD.    Xr Hip With Or Without Pelvis 2 - 3 View Right    Result Date: 1/10/2021   1. Minimally displaced subcapital fracture the right hip is noted.  Electronically Signed ByDixie Davison MD On:1/10/2021 4:47 PM This report was  finalized on [id number] by  Gee Davison MD.      LABS:   Recent Results (from the past 24 hour(s))   Comprehensive Metabolic Panel    Collection Time: 01/10/21  2:38 PM    Specimen: Blood   Result Value Ref Range    Glucose 110 (H) 65 - 99 mg/dL    BUN 7 (L) 8 - 23 mg/dL    Creatinine 0.47 (L) 0.76 - 1.27 mg/dL    Sodium 134 (L) 136 - 145 mmol/L    Potassium 3.5 3.5 - 5.2 mmol/L    Chloride 100 98 - 107 mmol/L    CO2 22.0 22.0 - 29.0 mmol/L    Calcium 8.4 (L) 8.6 - 10.5 mg/dL    Total Protein 7.5 6.0 - 8.5 g/dL    Albumin 3.00 (L) 3.50 - 5.20 g/dL    ALT (SGPT) 20 1 - 41 U/L    AST (SGOT) 51 (H) 1 - 40 U/L    Alkaline Phosphatase 114 39 - 117 U/L    Total Bilirubin 2.6 (H) 0.0 - 1.2 mg/dL    eGFR Non African Amer >150 >60 mL/min/1.73    Globulin 4.5 gm/dL    A/G Ratio 0.7 g/dL    BUN/Creatinine Ratio 14.9 7.0 - 25.0    Anion Gap 12.0 5.0 - 15.0 mmol/L   Troponin    Collection Time: 01/10/21  2:38 PM    Specimen: Blood   Result Value Ref Range    Troponin T <0.010 0.000 - 0.030 ng/mL   Ethanol    Collection Time: 01/10/21  2:38 PM    Specimen: Blood   Result Value Ref Range    Ethanol % 0.099 %   Acetaminophen Level    Collection Time: 01/10/21  2:38 PM    Specimen: Blood   Result Value Ref Range    Acetaminophen <5.0 0.0 - 30.0 mcg/mL   Salicylate Level    Collection Time: 01/10/21  2:38 PM    Specimen: Blood   Result Value Ref Range    Salicylate <0.3 <=30.0 mg/dL   CBC Auto Differential    Collection Time: 01/10/21  2:38 PM    Specimen: Blood   Result Value Ref Range    WBC 7.90 3.40 - 10.80 10*3/mm3    RBC 3.68 (L) 4.14 - 5.80 10*6/mm3    Hemoglobin 12.8 (L) 13.0 - 17.7 g/dL    Hematocrit 37.8 37.5 - 51.0 %    .8 (H) 79.0 - 97.0 fL    MCH 34.7 (H) 26.6 - 33.0 pg    MCHC 33.8 31.5 - 35.7 g/dL    RDW 15.1 12.3 - 15.4 %    RDW-SD 53.8 37.0 - 54.0 fl    MPV 7.0 6.0 - 12.0 fL    Platelets 201 140 - 450 10*3/mm3    Neutrophil % 79.6 (H) 42.7 - 76.0 %    Lymphocyte % 8.5 (L) 19.6 - 45.3 %    Monocyte %  9.5 5.0 - 12.0 %    Eosinophil % 1.6 0.3 - 6.2 %    Basophil % 0.8 0.0 - 1.5 %    Neutrophils, Absolute 6.30 1.70 - 7.00 10*3/mm3    Lymphocytes, Absolute 0.70 0.70 - 3.10 10*3/mm3    Monocytes, Absolute 0.80 0.10 - 0.90 10*3/mm3    Eosinophils, Absolute 0.10 0.00 - 0.40 10*3/mm3    Basophils, Absolute 0.10 0.00 - 0.20 10*3/mm3    nRBC 0.1 0.0 - 0.2 /100 WBC   Urinalysis With Microscopic If Indicated (No Culture) - Urine, Clean Catch    Collection Time: 01/10/21  4:33 PM    Specimen: Urine, Clean Catch   Result Value Ref Range    Color, UA Dark Yellow (A) Yellow, Straw    Appearance, UA Hazy (A) Clear    pH, UA 6.0 5.0 - 8.0    Specific Gravity, UA 1.009 1.005 - 1.030    Glucose, UA Negative Negative    Ketones, UA Negative Negative    Bilirubin, UA Negative Negative    Blood, UA Small (1+) (A) Negative    Protein, UA Negative Negative    Leuk Esterase, UA Negative Negative    Nitrite, UA Negative Negative    Urobilinogen, UA 1.0 E.U./dL 0.2 - 1.0 E.U./dL   Urinalysis, Microscopic Only - Urine, Clean Catch    Collection Time: 01/10/21  4:33 PM    Specimen: Urine, Clean Catch   Result Value Ref Range    RBC, UA 3-5 (A) None Seen /HPF    WBC, UA 0-2 (A) None Seen /HPF    Bacteria, UA None Seen None Seen /HPF    Squamous Epithelial Cells, UA 0-2 None Seen, 0-2 /HPF    Hyaline Casts, UA None Seen None Seen /LPF    Mucus, UA Moderate/2+ (A) None Seen, Trace /HPF    Methodology Manual Light Microscopy    COVID-19,CEPHEID,COR/ELLIE/PAD IN-HOUSE(OR EMERGENT/ADD-ON),NP SWAB IN TRANSPORT MEDIA 3-4 HR TAT - Swab, Nasopharynx    Collection Time: 01/10/21  4:34 PM    Specimen: Nasopharynx; Swab   Result Value Ref Range    COVID19 Not Detected Not Detected - Ref. Range   Basic Metabolic Panel    Collection Time: 01/11/21  3:33 AM    Specimen: Blood   Result Value Ref Range    Glucose 84 65 - 99 mg/dL    BUN 8 8 - 23 mg/dL    Creatinine 0.39 (L) 0.76 - 1.27 mg/dL    Sodium 135 (L) 136 - 145 mmol/L    Potassium 3.8 3.5 - 5.2 mmol/L     Chloride 102 98 - 107 mmol/L    CO2 23.0 22.0 - 29.0 mmol/L    Calcium 8.3 (L) 8.6 - 10.5 mg/dL    eGFR Non African Amer >150 >60 mL/min/1.73    BUN/Creatinine Ratio 20.5 7.0 - 25.0    Anion Gap 10.0 5.0 - 15.0 mmol/L   Protime-INR    Collection Time: 01/11/21  3:33 AM    Specimen: Blood   Result Value Ref Range    Protime 16.6 (H) 9.6 - 11.7 Seconds    INR 1.54 (H) 0.93 - 1.10   aPTT    Collection Time: 01/11/21  3:33 AM    Specimen: Blood   Result Value Ref Range    PTT 38.8 (H) 24.0 - 31.0 seconds   CBC Auto Differential    Collection Time: 01/11/21  3:34 AM    Specimen: Blood   Result Value Ref Range    WBC 9.60 3.40 - 10.80 10*3/mm3    RBC 3.42 (L) 4.14 - 5.80 10*6/mm3    Hemoglobin 11.8 (L) 13.0 - 17.7 g/dL    Hematocrit 34.3 (L) 37.5 - 51.0 %    .3 (H) 79.0 - 97.0 fL    MCH 34.4 (H) 26.6 - 33.0 pg    MCHC 34.3 31.5 - 35.7 g/dL    RDW 14.8 12.3 - 15.4 %    RDW-SD 52.1 37.0 - 54.0 fl    MPV 6.8 6.0 - 12.0 fL    Platelets 172 140 - 450 10*3/mm3    Neutrophil % 82.0 (H) 42.7 - 76.0 %    Lymphocyte % 6.7 (L) 19.6 - 45.3 %    Monocyte % 8.1 5.0 - 12.0 %    Eosinophil % 2.6 0.3 - 6.2 %    Basophil % 0.6 0.0 - 1.5 %    Neutrophils, Absolute 7.90 (H) 1.70 - 7.00 10*3/mm3    Lymphocytes, Absolute 0.60 (L) 0.70 - 3.10 10*3/mm3    Monocytes, Absolute 0.80 0.10 - 0.90 10*3/mm3    Eosinophils, Absolute 0.20 0.00 - 0.40 10*3/mm3    Basophils, Absolute 0.10 0.00 - 0.20 10*3/mm3    nRBC 0.1 0.0 - 0.2 /100 WBC       IMPRESSION:  Patient is a 65 y.o. Not  or  male with right Femoral Neck Fracture    PLAN:   · Admited to: Patricia Morales MD  · Diet: NPO after midnight, Regular for Now  · Weight Bearing:Right Lower Extremity Non Weight Bearing until after surgery  · Labs: None additional needed  · Imaging: None additional needed  · Surgery: RTHA  · Disposition: will have surgery tomorrow.  upon discharge     Jennifer KOTHARI  Orthopaedic Surgery  [facility]  [phone] [facility]  "Office  [phone] - [place] Office        Orthopaedic Surgery  Hip Fracture Consult Note  Jennifer Tafoya APRN  [phone]    HPI:  Patient is a 65 y.o. white  male who presents with hip pain after a fall from standing.  They presented to the ER for further workup where a right Femoral Neck Fracture was found. I was consulted for further management.     MEDICAL HISTORY  · History reviewed. No pertinent past medical history.  · History reviewed. No pertinent surgical history.  Prior to Admission medications    Not on File   ·   Allergies   Allergen Reactions   • Sulfa Antibiotics Unknown - High Severity   ·   ·   · There is no immunization history on file for this patient.  Social History     Tobacco Use   • Smoking status: Former Smoker     Quit date:      Years since quittin.0   • Smokeless tobacco: Current User     Types: Chew   Substance Use Topics   • Alcohol use: Yes     Alcohol/week: 5.0 standard drinks     Types: 5 Cans of beer per week     Comment: drinks 5 beers every     ·    Social History     Substance and Sexual Activity   Drug Use Never   ·     VITALS: /72 (BP Location: Right arm, Patient Position: Lying)   Pulse 92   Temp 98 °F (36.7 °C) (Oral)   Resp 15   Ht 182.9 cm (72\")   Wt 81.6 kg (179 lb 14.3 oz)   SpO2 92%   BMI 24.40 kg/m²  Body mass index is 24.4 kg/m².    PHYSICAL EXAM:   · CONSTITUTIONAL: No acute distress  · LUNGS: Equal chest rise, no shortness of air  · CARDIOVASCULAR: palpable peripheral pulses  · SKIN: no skin lesions in the area examined  · LYMPH: no lymphadenopathy in the area examined  · Right Lower Extremity  · Tenderness to Palpation: Tenderness to palpation at the hip  · Pulses:  Palpable DP/PT pulses  · Sensation: Sensation intact to light touch to saphenous/sural/deep peroneal/superficial peroneal/tibial nerves  · Motor: 5 out of 5 EHL/FHL/TA/GS motor complexes  · Range of Motion: Range of motion of hip deferred secondary to pain.  Positive " pain with passive leg roll    RADIOLOGY REVIEW:   Ct Abdomen Pelvis Without Contrast    Result Date: 1/10/2021   1. Diffuse abdominal ascites is noted thought secondary to cirrhosis with secondary signs of splenomegaly and portal hypertension with prominent mesenteric vessels and varices suspected. In addition there is left-sided pleural effusion 2. Subcapital fracture of the right hip 3. Incidental note is made of gallstones within the gallbladder    Electronically Signed ByDixie Davison MD On:1/10/2021 3:49 PM This report was finalized on [id number] by  Gee Davison MD.    Ct Head Without Contrast    Result Date: 1/10/2021   1. There is no acute edema hemorrhage or obvious mass effect. 2. Chronic sinusitis is noted involving the left maxillary and ethmoid sinuses. 3. Calvarium is grossly intact.   Brain MRI is more sensitive to evaluate for acute or subacute infarcts and to evaluate for intracranial metastatic disease.  Electronically Signed By-Gee Davison MD On:1/10/2021 3:38 PM This report was finalized on [id number] by  Gee Davison MD.    Ct Cervical Spine Without Contrast    Result Date: 1/10/2021   1. No obvious fracture or subluxation 2. Multilevel degenerative disc and degenerative changes are identified. 3. Incidental note is made of left pleural effusion. The cause is not readily apparent on this exam  Electronically Signed ByDixie Davison MD On:1/10/2021 3:43 PM This report was finalized on [id number] by  Gee Davison MD.    Xr Chest 1 View    Result Date: 1/10/2021   1. There is a moderate left-sided pleural effusion with underlying chronic changes.   Electronically Signed ByDixie Davison MD On:1/10/2021 4:47 PM This report was finalized on [id number] by  Gee Davison MD.    Xr Hip With Or Without Pelvis 2 - 3 View Right    Result Date: 1/10/2021   1. Minimally displaced subcapital fracture the right hip is noted.  Electronically Signed ByDixie  MD Laney On:1/10/2021 4:47 PM This report was finalized on [id number] by  Gee Davison MD.      LABS:   Recent Results (from the past 24 hour(s))   Comprehensive Metabolic Panel    Collection Time: 01/10/21  2:38 PM    Specimen: Blood   Result Value Ref Range    Glucose 110 (H) 65 - 99 mg/dL    BUN 7 (L) 8 - 23 mg/dL    Creatinine 0.47 (L) 0.76 - 1.27 mg/dL    Sodium 134 (L) 136 - 145 mmol/L    Potassium 3.5 3.5 - 5.2 mmol/L    Chloride 100 98 - 107 mmol/L    CO2 22.0 22.0 - 29.0 mmol/L    Calcium 8.4 (L) 8.6 - 10.5 mg/dL    Total Protein 7.5 6.0 - 8.5 g/dL    Albumin 3.00 (L) 3.50 - 5.20 g/dL    ALT (SGPT) 20 1 - 41 U/L    AST (SGOT) 51 (H) 1 - 40 U/L    Alkaline Phosphatase 114 39 - 117 U/L    Total Bilirubin 2.6 (H) 0.0 - 1.2 mg/dL    eGFR Non African Amer >150 >60 mL/min/1.73    Globulin 4.5 gm/dL    A/G Ratio 0.7 g/dL    BUN/Creatinine Ratio 14.9 7.0 - 25.0    Anion Gap 12.0 5.0 - 15.0 mmol/L   Troponin    Collection Time: 01/10/21  2:38 PM    Specimen: Blood   Result Value Ref Range    Troponin T <0.010 0.000 - 0.030 ng/mL   Ethanol    Collection Time: 01/10/21  2:38 PM    Specimen: Blood   Result Value Ref Range    Ethanol % 0.099 %   Acetaminophen Level    Collection Time: 01/10/21  2:38 PM    Specimen: Blood   Result Value Ref Range    Acetaminophen <5.0 0.0 - 30.0 mcg/mL   Salicylate Level    Collection Time: 01/10/21  2:38 PM    Specimen: Blood   Result Value Ref Range    Salicylate <0.3 <=30.0 mg/dL   CBC Auto Differential    Collection Time: 01/10/21  2:38 PM    Specimen: Blood   Result Value Ref Range    WBC 7.90 3.40 - 10.80 10*3/mm3    RBC 3.68 (L) 4.14 - 5.80 10*6/mm3    Hemoglobin 12.8 (L) 13.0 - 17.7 g/dL    Hematocrit 37.8 37.5 - 51.0 %    .8 (H) 79.0 - 97.0 fL    MCH 34.7 (H) 26.6 - 33.0 pg    MCHC 33.8 31.5 - 35.7 g/dL    RDW 15.1 12.3 - 15.4 %    RDW-SD 53.8 37.0 - 54.0 fl    MPV 7.0 6.0 - 12.0 fL    Platelets 201 140 - 450 10*3/mm3    Neutrophil % 79.6 (H) 42.7 - 76.0 %     Lymphocyte % 8.5 (L) 19.6 - 45.3 %    Monocyte % 9.5 5.0 - 12.0 %    Eosinophil % 1.6 0.3 - 6.2 %    Basophil % 0.8 0.0 - 1.5 %    Neutrophils, Absolute 6.30 1.70 - 7.00 10*3/mm3    Lymphocytes, Absolute 0.70 0.70 - 3.10 10*3/mm3    Monocytes, Absolute 0.80 0.10 - 0.90 10*3/mm3    Eosinophils, Absolute 0.10 0.00 - 0.40 10*3/mm3    Basophils, Absolute 0.10 0.00 - 0.20 10*3/mm3    nRBC 0.1 0.0 - 0.2 /100 WBC   Urinalysis With Microscopic If Indicated (No Culture) - Urine, Clean Catch    Collection Time: 01/10/21  4:33 PM    Specimen: Urine, Clean Catch   Result Value Ref Range    Color, UA Dark Yellow (A) Yellow, Straw    Appearance, UA Hazy (A) Clear    pH, UA 6.0 5.0 - 8.0    Specific Gravity, UA 1.009 1.005 - 1.030    Glucose, UA Negative Negative    Ketones, UA Negative Negative    Bilirubin, UA Negative Negative    Blood, UA Small (1+) (A) Negative    Protein, UA Negative Negative    Leuk Esterase, UA Negative Negative    Nitrite, UA Negative Negative    Urobilinogen, UA 1.0 E.U./dL 0.2 - 1.0 E.U./dL   Urinalysis, Microscopic Only - Urine, Clean Catch    Collection Time: 01/10/21  4:33 PM    Specimen: Urine, Clean Catch   Result Value Ref Range    RBC, UA 3-5 (A) None Seen /HPF    WBC, UA 0-2 (A) None Seen /HPF    Bacteria, UA None Seen None Seen /HPF    Squamous Epithelial Cells, UA 0-2 None Seen, 0-2 /HPF    Hyaline Casts, UA None Seen None Seen /LPF    Mucus, UA Moderate/2+ (A) None Seen, Trace /HPF    Methodology Manual Light Microscopy    COVID-19,CEPHEID,COR/ELLIE/PAD IN-HOUSE(OR EMERGENT/ADD-ON),NP SWAB IN TRANSPORT MEDIA 3-4 HR TAT - Swab, Nasopharynx    Collection Time: 01/10/21  4:34 PM    Specimen: Nasopharynx; Swab   Result Value Ref Range    COVID19 Not Detected Not Detected - Ref. Range   Basic Metabolic Panel    Collection Time: 01/11/21  3:33 AM    Specimen: Blood   Result Value Ref Range    Glucose 84 65 - 99 mg/dL    BUN 8 8 - 23 mg/dL    Creatinine 0.39 (L) 0.76 - 1.27 mg/dL    Sodium 135 (L) 136  - 145 mmol/L    Potassium 3.8 3.5 - 5.2 mmol/L    Chloride 102 98 - 107 mmol/L    CO2 23.0 22.0 - 29.0 mmol/L    Calcium 8.3 (L) 8.6 - 10.5 mg/dL    eGFR Non African Amer >150 >60 mL/min/1.73    BUN/Creatinine Ratio 20.5 7.0 - 25.0    Anion Gap 10.0 5.0 - 15.0 mmol/L   Protime-INR    Collection Time: 01/11/21  3:33 AM    Specimen: Blood   Result Value Ref Range    Protime 16.6 (H) 9.6 - 11.7 Seconds    INR 1.54 (H) 0.93 - 1.10   aPTT    Collection Time: 01/11/21  3:33 AM    Specimen: Blood   Result Value Ref Range    PTT 38.8 (H) 24.0 - 31.0 seconds   CBC Auto Differential    Collection Time: 01/11/21  3:34 AM    Specimen: Blood   Result Value Ref Range    WBC 9.60 3.40 - 10.80 10*3/mm3    RBC 3.42 (L) 4.14 - 5.80 10*6/mm3    Hemoglobin 11.8 (L) 13.0 - 17.7 g/dL    Hematocrit 34.3 (L) 37.5 - 51.0 %    .3 (H) 79.0 - 97.0 fL    MCH 34.4 (H) 26.6 - 33.0 pg    MCHC 34.3 31.5 - 35.7 g/dL    RDW 14.8 12.3 - 15.4 %    RDW-SD 52.1 37.0 - 54.0 fl    MPV 6.8 6.0 - 12.0 fL    Platelets 172 140 - 450 10*3/mm3    Neutrophil % 82.0 (H) 42.7 - 76.0 %    Lymphocyte % 6.7 (L) 19.6 - 45.3 %    Monocyte % 8.1 5.0 - 12.0 %    Eosinophil % 2.6 0.3 - 6.2 %    Basophil % 0.6 0.0 - 1.5 %    Neutrophils, Absolute 7.90 (H) 1.70 - 7.00 10*3/mm3    Lymphocytes, Absolute 0.60 (L) 0.70 - 3.10 10*3/mm3    Monocytes, Absolute 0.80 0.10 - 0.90 10*3/mm3    Eosinophils, Absolute 0.20 0.00 - 0.40 10*3/mm3    Basophils, Absolute 0.10 0.00 - 0.20 10*3/mm3    nRBC 0.1 0.0 - 0.2 /100 WBC       IMPRESSION:  Patient is a 65 y.o. Not  or  male with right Femoral Neck Fracture    PLAN:   · Admited to: Patricia Morales MD  · Diet: NPO after midnight, Regular for Now  · Weight Bearing:Right Lower Extremity Non Weight Bearing until after surgery  · Labs: None additional needed  · Imaging: None additional needed  · Surgery: Total hip arthroplasty for femoral neck fracture  · Hip Nail Consent: The risks and benefits of operative versus  nonoperative treatment were discussed. They have elected to undergo operative treatment of the  injury. The general risks discussed included but were not limited to the risk of anesthesia, medical complications, infection, the need for further procedures, damage to neurovascular structures, blood clots, and continued pain.  No guarantees were made. Specifically, for this fracture I had a thorough discussion with the patient about the operative risks of intramedullary nailing.  These risks included nonunion/malunion, continued pain, advancement of arthritis and hardware irritation.  The patient and/or family wished to proceed with surgery.  The surgical consent was signed.  · Disposition: will plan for surgery tomorrow and HH after discharge    Jennifer KOTHARI  Orthopaedic Surgery  [facility]  [phone] - [place] Office  [phone] - [facility]

## 2021-01-11 NOTE — PROGRESS NOTES
Discharge Planning Assessment  [facility]     Patient Name: Bereket Mckay  MRN: [id number]  Today's Date: 1/11/2021    Admit Date: 1/10/2021    Discharge Needs Assessment     Row Name 01/11/21 1220       Living Environment    Lives With  alone    Current Living Arrangements  home/apartment/condo    Potentially Unsafe Housing Conditions  unable to assess    Primary Care Provided by  child(micah)    Provides Primary Care For  no one, unable/limited ability to care for self    Family Caregiver if Needed  child(micah), adult    Family Caregiver Names  carolina garcia    Quality of Family Relationships  supportive    Able to Return to Prior Arrangements  yes daughter wants home health       Resource/Environmental Concerns    Transportation Concerns  car, none       Transition Planning    Patient/Family Anticipates Transition to  home with family    Patient/Family Anticipated Services at Transition      Transportation Anticipated  family or friend will provide       Discharge Needs Assessment    Readmission Within the Last 30 Days  no previous admission in last 30 days    Concerns to be Addressed  discharge planning    Provided Post Acute Provider List?  N/A    N/A Provider List Comment  referral to [facility]        Discharge Plan     Row Name 01/11/21 1222       Plan    Plan  anticipate return home with daughter and referral to [facility]    Patient/Family in Agreement with Plan  yes    Plan Comments  spoke to patient and daughter in room staying less than 15 mins in room and staying 6 feet way and wearing ppe(mask and goggles); carolina garcia states that patient lives alone and does not drive; she takes him on trips as needed; she states that she would like patient to return home with home health;  she states that patient does have a primary md and takes no medications at home; she would like [facility] for pharmacy as needed; referral to [facility]  [facility] for dr theodore to sign home health orders; she states she has a hospital bed for patient to use        Continued Care and Services - Admitted Since 1/10/2021     Home Medical Care     Service Provider Request Status Selected Services Address Phone Fax Patient Preferred    [facility]  Accepted N/A [address] [place] [address] [phone] --               Functional Status    Usual Activity Tolerance  poor    Current Activity Tolerance  poor       Functional Status, IADL    Medications  assistive person    Meal Preparation  -- uses microwave foods      Patient Forms    Important Message from Medicare (IMM)  Delivered    Delivered to  Support person copy of im letter given to daughter jose in person in room; information explained and she verbalizes understanding of content    Method of delivery  In person            Carol naegele rn  Case management  Office number [phone]  Cell phone [phone]

## 2021-01-12 ENCOUNTER — APPOINTMENT (OUTPATIENT)
Dept: GENERAL RADIOLOGY | Facility: HOSPITAL | Age: 66
End: 2021-01-12

## 2021-01-12 ENCOUNTER — APPOINTMENT (OUTPATIENT)
Dept: NUCLEAR MEDICINE | Facility: HOSPITAL | Age: 66
End: 2021-01-12

## 2021-01-12 ENCOUNTER — ANESTHESIA (OUTPATIENT)
Dept: PERIOP | Facility: HOSPITAL | Age: 66
End: 2021-01-12

## 2021-01-12 ENCOUNTER — ANESTHESIA EVENT (OUTPATIENT)
Dept: PERIOP | Facility: HOSPITAL | Age: 66
End: 2021-01-12

## 2021-01-12 ENCOUNTER — APPOINTMENT (OUTPATIENT)
Dept: CARDIOLOGY | Facility: HOSPITAL | Age: 66
End: 2021-01-12

## 2021-01-12 LAB
ALBUMIN SERPL-MCNC: 2.6 G/DL (ref 3.5–5.2)
ALBUMIN/GLOB SERPL: 0.7 G/DL
ALP SERPL-CCNC: 100 U/L (ref 39–117)
ALT SERPL W P-5'-P-CCNC: 15 U/L (ref 1–41)
ANION GAP SERPL CALCULATED.3IONS-SCNC: 9 MMOL/L (ref 5–15)
AST SERPL-CCNC: 38 U/L (ref 1–40)
BILIRUB SERPL-MCNC: 3 MG/DL (ref 0–1.2)
BUN SERPL-MCNC: 10 MG/DL (ref 8–23)
BUN/CREAT SERPL: 21.3 (ref 7–25)
CALCIUM SPEC-SCNC: 8.2 MG/DL (ref 8.6–10.5)
CHLORIDE SERPL-SCNC: 100 MMOL/L (ref 98–107)
CO2 SERPL-SCNC: 25 MMOL/L (ref 22–29)
CREAT SERPL-MCNC: 0.47 MG/DL (ref 0.76–1.27)
FOLATE SERPL-MCNC: 2.23 NG/ML (ref 4.78–24.2)
GFR SERPL CREATININE-BSD FRML MDRD: >150 ML/MIN/1.73
GLOBULIN UR ELPH-MCNC: 3.5 GM/DL
GLUCOSE SERPL-MCNC: 95 MG/DL (ref 65–99)
POTASSIUM SERPL-SCNC: 3.8 MMOL/L (ref 3.5–5.2)
PROT SERPL-MCNC: 6.1 G/DL (ref 6–8.5)
SODIUM SERPL-SCNC: 134 MMOL/L (ref 136–145)
TROPONIN T SERPL-MCNC: <0.01 NG/ML (ref 0–0.03)
VIT B12 BLD-MCNC: 629 PG/ML (ref 211–946)

## 2021-01-12 PROCEDURE — 25010000002 METOCLOPRAMIDE PER 10 MG: Performed by: ANESTHESIOLOGY

## 2021-01-12 PROCEDURE — 93018 CV STRESS TEST I&R ONLY: CPT | Performed by: NURSE PRACTITIONER

## 2021-01-12 PROCEDURE — 0SR906A REPLACEMENT OF RIGHT HIP JOINT WITH OXIDIZED ZIRCONIUM ON POLYETHYLENE SYNTHETIC SUBSTITUTE, UNCEMENTED, OPEN APPROACH: ICD-10-PCS | Performed by: ORTHOPAEDIC SURGERY

## 2021-01-12 PROCEDURE — 84484 ASSAY OF TROPONIN QUANT: CPT | Performed by: HOSPITALIST

## 2021-01-12 PROCEDURE — 82607 VITAMIN B-12: CPT | Performed by: INTERNAL MEDICINE

## 2021-01-12 PROCEDURE — 25010000002 ONDANSETRON PER 1 MG: Performed by: HOSPITALIST

## 2021-01-12 PROCEDURE — C9290 INJ, BUPIVACAINE LIPOSOME: HCPCS | Performed by: ORTHOPAEDIC SURGERY

## 2021-01-12 PROCEDURE — 93306 TTE W/DOPPLER COMPLETE: CPT

## 2021-01-12 PROCEDURE — 76000 FLUOROSCOPY <1 HR PHYS/QHP: CPT

## 2021-01-12 PROCEDURE — 99232 SBSQ HOSP IP/OBS MODERATE 35: CPT | Performed by: INTERNAL MEDICINE

## 2021-01-12 PROCEDURE — 80053 COMPREHEN METABOLIC PANEL: CPT | Performed by: INTERNAL MEDICINE

## 2021-01-12 PROCEDURE — 25010000002 REGADENOSON 0.4 MG/5ML SOLUTION: Performed by: HOSPITALIST

## 2021-01-12 PROCEDURE — 25010000002 FENTANYL CITRATE (PF) 100 MCG/2ML SOLUTION: Performed by: ANESTHESIOLOGY

## 2021-01-12 PROCEDURE — 93017 CV STRESS TEST TRACING ONLY: CPT

## 2021-01-12 PROCEDURE — C1776 JOINT DEVICE (IMPLANTABLE): HCPCS | Performed by: ORTHOPAEDIC SURGERY

## 2021-01-12 PROCEDURE — 78452 HT MUSCLE IMAGE SPECT MULT: CPT | Performed by: INTERNAL MEDICINE

## 2021-01-12 PROCEDURE — A9500 TC99M SESTAMIBI: HCPCS | Performed by: HOSPITALIST

## 2021-01-12 PROCEDURE — 78452 HT MUSCLE IMAGE SPECT MULT: CPT

## 2021-01-12 PROCEDURE — 25010000003 BUPIVACAINE LIPOSOME 1.3 % SUSPENSION 20 ML VIAL: Performed by: ORTHOPAEDIC SURGERY

## 2021-01-12 PROCEDURE — 93306 TTE W/DOPPLER COMPLETE: CPT | Performed by: INTERNAL MEDICINE

## 2021-01-12 PROCEDURE — 25010000002 PROPOFOL 10 MG/ML EMULSION: Performed by: ANESTHESIOLOGY

## 2021-01-12 PROCEDURE — 0 TECHNETIUM SESTAMIBI: Performed by: HOSPITALIST

## 2021-01-12 PROCEDURE — 82746 ASSAY OF FOLIC ACID SERUM: CPT | Performed by: INTERNAL MEDICINE

## 2021-01-12 PROCEDURE — 73501 X-RAY EXAM HIP UNI 1 VIEW: CPT

## 2021-01-12 PROCEDURE — 73502 X-RAY EXAM HIP UNI 2-3 VIEWS: CPT

## 2021-01-12 PROCEDURE — 99232 SBSQ HOSP IP/OBS MODERATE 35: CPT | Performed by: HOSPITALIST

## 2021-01-12 DEVICE — DEV CONTRL TISS STRATAFIX SPIRAL PDS PLS CT1 2-0 1/2 30CM: Type: IMPLANTABLE DEVICE | Site: HIP | Status: FUNCTIONAL

## 2021-01-12 DEVICE — OR3O DUAL MOBILITY LINER 44/56
Type: IMPLANTABLE DEVICE | Site: HIP | Status: FUNCTIONAL
Brand: OR3O DUAL MOBILITY

## 2021-01-12 DEVICE — OR3O DUAL MOBILITY XLPE INSERT 28/44
Type: IMPLANTABLE DEVICE | Site: HIP | Status: FUNCTIONAL
Brand: OR3O DUAL MOBILITY

## 2021-01-12 DEVICE — OXINIUM FEMORAL HEAD 12/14 TAPER                                    28 MM +0
Type: IMPLANTABLE DEVICE | Site: HIP | Status: FUNCTIONAL
Brand: OXINIUM

## 2021-01-12 DEVICE — IMPLANTABLE DEVICE: Type: IMPLANTABLE DEVICE | Site: HIP | Status: FUNCTIONAL

## 2021-01-12 DEVICE — R3 3 HOLE ACETABULAR SHELL 56MM
Type: IMPLANTABLE DEVICE | Site: HIP | Status: FUNCTIONAL
Brand: R3 ACETABULAR

## 2021-01-12 DEVICE — POLARSTEM STANDARD NON-CEMENTED                                    WITH TI/HA 6
Type: IMPLANTABLE DEVICE | Site: HIP | Status: FUNCTIONAL
Brand: POLARSTEM

## 2021-01-12 RX ORDER — HYDROCODONE BITARTRATE AND ACETAMINOPHEN 5; 325 MG/1; MG/1
2 TABLET ORAL EVERY 4 HOURS PRN
Status: DISCONTINUED | OUTPATIENT
Start: 2021-01-12 | End: 2021-01-15 | Stop reason: HOSPADM

## 2021-01-12 RX ORDER — FLUMAZENIL 0.1 MG/ML
0.5 INJECTION INTRAVENOUS AS NEEDED
Status: DISCONTINUED | OUTPATIENT
Start: 2021-01-12 | End: 2021-01-12 | Stop reason: HOSPADM

## 2021-01-12 RX ORDER — CELECOXIB 200 MG/1
200 CAPSULE ORAL ONCE
Status: COMPLETED | OUTPATIENT
Start: 2021-01-12 | End: 2021-01-12

## 2021-01-12 RX ORDER — NEOSTIGMINE METHYLSULFATE 5 MG/5 ML
SYRINGE (ML) INTRAVENOUS AS NEEDED
Status: DISCONTINUED | OUTPATIENT
Start: 2021-01-12 | End: 2021-01-12 | Stop reason: SURG

## 2021-01-12 RX ORDER — PROMETHAZINE HYDROCHLORIDE 25 MG/1
25 TABLET ORAL ONCE AS NEEDED
Status: DISCONTINUED | OUTPATIENT
Start: 2021-01-12 | End: 2021-01-12 | Stop reason: HOSPADM

## 2021-01-12 RX ORDER — ACETAMINOPHEN 650 MG/1
650 SUPPOSITORY RECTAL ONCE AS NEEDED
Status: DISCONTINUED | OUTPATIENT
Start: 2021-01-12 | End: 2021-01-12 | Stop reason: HOSPADM

## 2021-01-12 RX ORDER — ASPIRIN 325 MG
325 TABLET ORAL DAILY
Status: DISCONTINUED | OUTPATIENT
Start: 2021-01-13 | End: 2021-01-15 | Stop reason: HOSPADM

## 2021-01-12 RX ORDER — NALOXONE HCL 0.4 MG/ML
0.4 VIAL (ML) INJECTION AS NEEDED
Status: DISCONTINUED | OUTPATIENT
Start: 2021-01-12 | End: 2021-01-12 | Stop reason: HOSPADM

## 2021-01-12 RX ORDER — GABAPENTIN 300 MG/1
600 CAPSULE ORAL ONCE
Status: DISCONTINUED | OUTPATIENT
Start: 2021-01-12 | End: 2021-01-12 | Stop reason: HOSPADM

## 2021-01-12 RX ORDER — ACETAMINOPHEN 500 MG
1000 TABLET ORAL ONCE
Status: DISCONTINUED | OUTPATIENT
Start: 2021-01-12 | End: 2021-01-12 | Stop reason: SDUPTHER

## 2021-01-12 RX ORDER — PHENYLEPHRINE HCL IN 0.9% NACL 0.5 MG/5ML
SYRINGE (ML) INTRAVENOUS AS NEEDED
Status: DISCONTINUED | OUTPATIENT
Start: 2021-01-12 | End: 2021-01-12 | Stop reason: SURG

## 2021-01-12 RX ORDER — MEPERIDINE HYDROCHLORIDE 25 MG/ML
12.5 INJECTION INTRAMUSCULAR; INTRAVENOUS; SUBCUTANEOUS
Status: DISCONTINUED | OUTPATIENT
Start: 2021-01-12 | End: 2021-01-12 | Stop reason: HOSPADM

## 2021-01-12 RX ORDER — MORPHINE SULFATE 4 MG/ML
2 INJECTION, SOLUTION INTRAMUSCULAR; INTRAVENOUS
Status: DISCONTINUED | OUTPATIENT
Start: 2021-01-12 | End: 2021-01-12 | Stop reason: HOSPADM

## 2021-01-12 RX ORDER — ONDANSETRON 2 MG/ML
4 INJECTION INTRAMUSCULAR; INTRAVENOUS ONCE AS NEEDED
Status: DISCONTINUED | OUTPATIENT
Start: 2021-01-12 | End: 2021-01-12 | Stop reason: HOSPADM

## 2021-01-12 RX ORDER — ACETAMINOPHEN 500 MG
1000 TABLET ORAL ONCE
Status: COMPLETED | OUTPATIENT
Start: 2021-01-12 | End: 2021-01-12

## 2021-01-12 RX ORDER — PROPOFOL 10 MG/ML
VIAL (ML) INTRAVENOUS AS NEEDED
Status: DISCONTINUED | OUTPATIENT
Start: 2021-01-12 | End: 2021-01-12 | Stop reason: SURG

## 2021-01-12 RX ORDER — HYDROCODONE BITARTRATE AND ACETAMINOPHEN 5; 325 MG/1; MG/1
1 TABLET ORAL EVERY 4 HOURS PRN
Status: DISCONTINUED | OUTPATIENT
Start: 2021-01-12 | End: 2021-01-15 | Stop reason: HOSPADM

## 2021-01-12 RX ORDER — FENTANYL CITRATE 50 UG/ML
INJECTION, SOLUTION INTRAMUSCULAR; INTRAVENOUS AS NEEDED
Status: DISCONTINUED | OUTPATIENT
Start: 2021-01-12 | End: 2021-01-12 | Stop reason: SURG

## 2021-01-12 RX ORDER — HYDROMORPHONE HCL 110MG/55ML
0.5 PATIENT CONTROLLED ANALGESIA SYRINGE INTRAVENOUS
Status: DISCONTINUED | OUTPATIENT
Start: 2021-01-12 | End: 2021-01-12 | Stop reason: HOSPADM

## 2021-01-12 RX ORDER — ROCURONIUM BROMIDE 10 MG/ML
INJECTION, SOLUTION INTRAVENOUS AS NEEDED
Status: DISCONTINUED | OUTPATIENT
Start: 2021-01-12 | End: 2021-01-12 | Stop reason: SURG

## 2021-01-12 RX ORDER — HYDROCODONE BITARTRATE AND ACETAMINOPHEN 5; 325 MG/1; MG/1
1 TABLET ORAL ONCE AS NEEDED
Status: DISCONTINUED | OUTPATIENT
Start: 2021-01-12 | End: 2021-01-12 | Stop reason: HOSPADM

## 2021-01-12 RX ORDER — GLYCOPYRROLATE 1 MG/5 ML
SYRINGE (ML) INTRAVENOUS AS NEEDED
Status: DISCONTINUED | OUTPATIENT
Start: 2021-01-12 | End: 2021-01-12 | Stop reason: SURG

## 2021-01-12 RX ORDER — ACETAMINOPHEN 325 MG/1
650 TABLET ORAL ONCE AS NEEDED
Status: DISCONTINUED | OUTPATIENT
Start: 2021-01-12 | End: 2021-01-12 | Stop reason: HOSPADM

## 2021-01-12 RX ORDER — LORAZEPAM 2 MG/ML
0.5 INJECTION INTRAMUSCULAR
Status: DISCONTINUED | OUTPATIENT
Start: 2021-01-12 | End: 2021-01-12 | Stop reason: HOSPADM

## 2021-01-12 RX ORDER — PROMETHAZINE HYDROCHLORIDE 25 MG/1
25 SUPPOSITORY RECTAL ONCE AS NEEDED
Status: DISCONTINUED | OUTPATIENT
Start: 2021-01-12 | End: 2021-01-12 | Stop reason: HOSPADM

## 2021-01-12 RX ORDER — KETOROLAC TROMETHAMINE 15 MG/ML
15 INJECTION, SOLUTION INTRAMUSCULAR; INTRAVENOUS EVERY 6 HOURS PRN
Status: DISCONTINUED | OUTPATIENT
Start: 2021-01-12 | End: 2021-01-12 | Stop reason: HOSPADM

## 2021-01-12 RX ORDER — SODIUM CHLORIDE 9 MG/ML
INJECTION, SOLUTION INTRAVENOUS CONTINUOUS PRN
Status: DISCONTINUED | OUTPATIENT
Start: 2021-01-12 | End: 2021-01-12 | Stop reason: SURG

## 2021-01-12 RX ORDER — LIDOCAINE HYDROCHLORIDE 20 MG/ML
INJECTION, SOLUTION EPIDURAL; INFILTRATION; INTRACAUDAL; PERINEURAL AS NEEDED
Status: DISCONTINUED | OUTPATIENT
Start: 2021-01-12 | End: 2021-01-12 | Stop reason: SURG

## 2021-01-12 RX ORDER — METOCLOPRAMIDE HYDROCHLORIDE 5 MG/ML
INJECTION INTRAMUSCULAR; INTRAVENOUS AS NEEDED
Status: DISCONTINUED | OUTPATIENT
Start: 2021-01-12 | End: 2021-01-12 | Stop reason: SURG

## 2021-01-12 RX ADMIN — TRANEXAMIC ACID 1000 MG: 100 INJECTION, SOLUTION INTRAVENOUS at 15:54

## 2021-01-12 RX ADMIN — ACETAMINOPHEN 1000 MG: 500 TABLET, FILM COATED ORAL at 15:10

## 2021-01-12 RX ADMIN — LIDOCAINE HYDROCHLORIDE 50 MG: 20 INJECTION, SOLUTION EPIDURAL; INFILTRATION; INTRACAUDAL; PERINEURAL at 15:51

## 2021-01-12 RX ADMIN — PHENYLEPHRINE HYDROCHLORIDE 100 MCG: 10 INJECTION INTRAVENOUS at 15:54

## 2021-01-12 RX ADMIN — TECHNETIUM TC 99M SESTAMIBI 1 DOSE: 1 INJECTION INTRAVENOUS at 07:00

## 2021-01-12 RX ADMIN — Medication 4 MG: at 16:26

## 2021-01-12 RX ADMIN — Medication 3 ML: at 20:28

## 2021-01-12 RX ADMIN — METOCLOPRAMIDE 10 MG: 5 INJECTION, SOLUTION INTRAMUSCULAR; INTRAVENOUS at 15:50

## 2021-01-12 RX ADMIN — PROPOFOL 100 MCG/KG/MIN: 10 INJECTION, EMULSION INTRAVENOUS at 15:51

## 2021-01-12 RX ADMIN — REGADENOSON 0.4 MG: 0.08 INJECTION, SOLUTION INTRAVENOUS at 09:15

## 2021-01-12 RX ADMIN — SODIUM CHLORIDE: 0.9 INJECTION, SOLUTION INTRAVENOUS at 15:48

## 2021-01-12 RX ADMIN — CELECOXIB 200 MG: 200 CAPSULE ORAL at 15:10

## 2021-01-12 RX ADMIN — FENTANYL CITRATE 100 MCG: 50 INJECTION, SOLUTION INTRAMUSCULAR; INTRAVENOUS at 15:50

## 2021-01-12 RX ADMIN — ONDANSETRON 4 MG: 2 INJECTION INTRAMUSCULAR; INTRAVENOUS at 16:13

## 2021-01-12 RX ADMIN — PHENYLEPHRINE HYDROCHLORIDE 100 MCG: 10 INJECTION INTRAVENOUS at 16:12

## 2021-01-12 RX ADMIN — HYDROCODONE BITARTRATE AND ACETAMINOPHEN 1 TABLET: 10; 325 TABLET ORAL at 10:53

## 2021-01-12 RX ADMIN — Medication 3 ML: at 08:32

## 2021-01-12 RX ADMIN — PROPOFOL 150 MG: 10 INJECTION, EMULSION INTRAVENOUS at 15:50

## 2021-01-12 RX ADMIN — CEFAZOLIN SODIUM 2 G: 1 INJECTION, POWDER, FOR SOLUTION INTRAMUSCULAR; INTRAVENOUS at 15:53

## 2021-01-12 RX ADMIN — ROCURONIUM BROMIDE 30 MG: 10 INJECTION, SOLUTION INTRAVENOUS at 15:50

## 2021-01-12 RX ADMIN — TECHNETIUM TC 99M SESTAMIBI 1 DOSE: 1 INJECTION INTRAVENOUS at 09:15

## 2021-01-12 RX ADMIN — Medication 0.6 MG: at 16:27

## 2021-01-12 NOTE — ANESTHESIA PREPROCEDURE EVALUATION
Anesthesia Evaluation     Patient summary reviewed and Nursing notes reviewed   NPO Solid Status: > 6 hours  NPO Liquid Status: > 6 hours           Airway   Mallampati: II  TM distance: >3 FB  Neck ROM: full  No difficulty expected  Dental - normal exam     Pulmonary - negative pulmonary ROS and normal exam    breath sounds clear to auscultation  Cardiovascular - negative cardio ROS and normal exam    ECG reviewed  Rhythm: regular  Rate: normal        Neuro/Psych- negative ROS  GI/Hepatic/Renal/Endo - negative ROS     Musculoskeletal (-) negative ROS    Abdominal  - normal exam    Abdomen: soft.  Bowel sounds: normal.   Substance History - negative use     OB/GYN negative ob/gyn ROS         Other - negative ROS                       Anesthesia Plan    ASA 2     general   total IV anesthesia(ERAS MEDS TIVA.  NO BLOCK PSO)  intravenous induction     Anesthetic plan, all risks, benefits, and alternatives have been provided, discussed and informed consent has been obtained with: patient.  Use of blood products discussed with patient .

## 2021-01-12 NOTE — ANESTHESIA PROCEDURE NOTES
Airway  Urgency: elective    Date/Time: 1/12/2021 3:52 PM  End Time:1/12/2021 3:52 PM  Airway not difficult    General Information and Staff    Patient location during procedure: OR  Anesthesiologist: Ruben Chavez MD    Indications and Patient Condition  Indications for airway management: airway protection    Preoxygenated: yes  MILS maintained throughout  Mask difficulty assessment: 1 - vent by mask    Final Airway Details  Final airway type: endotracheal airway      Successful airway: ETT  Cuffed: yes   Successful intubation technique: direct laryngoscopy  Endotracheal tube insertion site: oral  Blade: Godfrey  Blade size: 3  ETT size (mm): 7.0  Cormack-Lehane Classification: grade I - full view of glottis  Placement verified by: chest auscultation and capnometry   Measured from: teeth  ETT/EBT  to teeth (cm): 21  Number of attempts at approach: 1  Assessment: lips, teeth, and gum same as pre-op and atraumatic intubation    Additional Comments  X1 WITH EASE. ATRAUMATIC.  TEETH IN PREOP CONDITION.  CUFF TO MINIMUM OCCLUSIVE CUFF PRESSURE. POSETCO2. BS=BS. GAUZE BITE BLOCK

## 2021-01-12 NOTE — OP NOTE
Anterior Total Hip Operative Note  Dr. DANICA Mercado II  [phone]    PATIENT NAME: Bereket Mckay  MRN: [id number]  : 1955 AGE: 65 y.o. GENDER: male  DATE OF OPERATION: 2021  PREOPERATIVE DIAGNOSIS: Femoral Neck Fracture  POSTOPERATIVE DIAGNOSIS: Same  OPERATION PERFORMED: Right Anterior Total Hip Arthroplasty  SURGEON: Bharath Mercado MD  Circulator: Claudette Menezes RN  Radiology Technologist: Rhoda Trejo  Scrub Person: Terrence Mcdonald  Assistant: Madhuri Corrales PA  ANESTHESIA: General  ASSISTANT: Madhuri Corrales. This case would not have been possible without another set of skilled surgical hands for retraction, use of instrumentation, and general assistance.  This assistance was vital to the success of the case.   ESTIMATED BLOOD LOSS: 200cc  SPONGE AND NEEDLE COUNT: Correct  INDICATIONS:  Fracture: This patient was noted to have a femoral neck fracture.  Surgical options were discussed with the patient and they elected to undergo a total hip replacement. A discussion of operative versus nonoperative treatment was had. They elected to undergo anterior total hip arthroplasty. The risks of surgery were discussed and included the risk of anesthesia, infection, damage to neurovascular structures, implant loosening/failure, fracture, hardware prominence, dislocation, the need for further procedures, medical complications, and others. No guarantees were made. The patient wished to proceed with surgery and a surgical consent was signed.  COMPONENTS:   · Acetabular Cup: Smith [employer] Nephew R3 acetabular cup: 56 Outer Diameter  · Cup Screws: Smith [employer]hew 6.5 mm screws: No Screws Were Used  · [employer] Nephew Neutral Acetabular Liner: Neutral  · Smith [employer] Nephew Polar Femoral Stem: Size 6  · Smith [employer] Nephew Oxinium Head: Dual Mobilitymm +0    PERTINENT FINDINGS: Fracture: Fracture of the femoral neck    DETAILS OF PROCEDURE:   The patient was met in the preoperative area. The site was marked. The consent and  H&P were reviewed. The patient was then wheeled back to the operative suite underwent anesthesia. The [place] table boots were secured to the patients’ feet. The patient was moved onto the [place] table and secured in the supine position. The perineal post was inserted and the boots were secured into the leg holders. Surgical alcohol was used to thoroughly clean the operative area.     The hip and leg was then prepped in the normal sterile fashion, multiple layers of sterile drapes, and surgical space suits for the entire operative team. New outer gloves were used by all sterile surgical team members after final draping. The surgical incision was marked. A surgical timeout was performed.    A Modified Mckoy-Hicks anterior approach was used. Dissection was carried down to the fascia. The fascia was incised and the tensor fascia kelby muscle was retracted laterally and the sartorius medially. The lateral femoral circumflex vessels were identified and cauterized using bovie. The rectus femoris was retracted medially. A capsulotomy was then performed. The capsule was tagged with Ethibond for later repair. Retractors were placed on either side of the femoral neck and dissection was further carried down so that the lesser trochanter could be palpated and the superior rim of the acetabulum could be visualized.    The femoral neck cut was then made from  just proximal to the lesser trochanter medially headed towards the saddle laterally. Care was taken not to extend the cut into the lesser or greater trochanters. The head and neck segment were removed with a corkscrew. The acetabulum was then exposed. The labrum was removed using a kocher and scalpel and excess osteophytes were removed using an osteotome.    The acetabulum was progressively reamed, beginning with medialization and then finalizing the position of the reamer to approximate the final cup position. Fluoroscopy was used to ensure proper placement of the reamer,  including adequate medialization as well as appropriate abduction and anteversion. The real cup was then opened and inserted using fluoroscopy, ensuring good position in terms of abduction and anteversion.     No Screws: Initial press-fit fixation of the cup was very robust and no screws were required for supplemental fixation.    After thorough irrigation and ensuring that no soft tissue was entrapped within the cup, the real liner was snapped into place.    The hook was placed just distal to the greater trochanter. The femur was then externally rotated, extended and adducted under the well leg. Soft tissue releases were performed to gain exposure to the proximal femur. Capsule was released from the saddle and the lateral femur. Care was taken to preserve the short external rotator tendons. The capsule was also released along the medial femur. The hydraulic femoral lift was then used to better expose the femur. Further soft tissue releases were then performed, again ensuring preservation of the short external rotators.    The femur was machined with a cookie cutter osteotome and then a rasp was used to further lateralize the starting point. The sclerotic bone on the lateral shoulder of the femur was removed with a rongeour and curette as needed to protect the greater trochanter. Progressive broaches were inserted until adequate fill had been achieved. Using fluoroscopy, the femoral stem was visualized after trial reduction of the hip. The length and offset were compared to the non-operative hip. Trials of stem size and neck length were trialed until equal leg length and offset were obtained. Additionally hip stability was tested with internal and external rotation of the leg. The leg was stable with at least 90° of external rotation and there was no impingement at 60° of internal rotation. After implantation of the final stem and ball, the leg was once again brought into normal anatomic position and relocated.  "Final x-rays were taken with final implants noting good position of the stem and cup, and no visualized fracture.. The hip was stable upon reduction.    No Prophylactic Cable: Before final reduction of the hip, the proximal femur and femoral calcar was thoroughly visualized to ensure that there was no fracture/crack. The bone quality was thought to be sufficient enough that no prophylactic cable was needed for this case.    An analgesic cocktail was then injected about the hip as well as the surgical dissection area. The capsule was closed.  The fascia was then closed with a running stitch and the skin was closed in layers.  A sterile dressing was applied.    The patient was moved from the [place] table to the [facility] where the boots were removed. The patient was taken to the recovery room in stable condition. There were no complications and the patient tolerated the procedure well.    R \"Elie\" Rogelio BUCK MD  Orthopaedic Surgery  [facility]  [phone]          \  "

## 2021-01-12 NOTE — PROGRESS NOTES
"      [facility] Medicine Services Daily Progress Note      Hospitalist Team  LOS 2 days      Patient Care Team:  Provider, No Known as PCP - General    Patient Location: [facility] MAIN OR/MAIN OR      Subjective   Subjective   Denies for any complaint, went for ECHO and stress test today. Plan for surgery in evening noted.  Chief Complaint / Subjective  Chief Complaint   Patient presents with   • Fall         Brief Synopsis of Hospital Course/HPI    Mr. Mckay is a 65 y.o.  presents to [facility] complaining of right hip pain and on going chronic back pain s/p fall. XR in the ER revealed subcapital right hip fracture.  Patient was admitted and orthopedic surgery was consulted.    Date::          ROS      Objective   Objective      Vital Signs  Temp:  [97.3 °F (36.3 °C)-97.7 °F (36.5 °C)] 97.3 °F (36.3 °C)  Heart Rate:  [81-95] 89  Resp:  [14-16] 14  BP: (125-154)/(60-84) 127/60  Oxygen Therapy  SpO2: 93 %  Pulse Oximetry Type: Intermittent  Device (Oxygen Therapy): room air  Flowsheet Rows      First Filed Value   Admission Height  182.9 cm (72\") Documented at 01/10/2021 1341   Admission Weight  77.1 kg (170 lb) Documented at 01/10/2021 1341        Intake & Output (last 3 days)       01/09 0701 - 01/10 0700 01/10 0701 - 01/11 0700 01/11 0701 - 01/12 0700 01/12 0701 - 01/13 0700    P.O.   200     Total Intake(mL/kg)   200 (2.4)     Urine (mL/kg/hr)  550 325 (0.2) 200 (0.3)    Stool    0    Total Output  550 325 200    Net  -550 -125 -200            Urine Unmeasured Occurrence    1 x    Stool Unmeasured Occurrence    1 x        Lines, Drains & Airways    Active LDAs     Name:   Placement date:   Placement time:   Site:   Days:    Peripheral IV 01/10/21 1437 Right Forearm   01/10/21    1437    Forearm   1                  Physical Exam:    Physical Exam  Vitals signs and nursing note reviewed.   Constitutional:       General: He is not in acute distress.     Appearance: Normal appearance. He is " well-developed. He is not ill-appearing, toxic-appearing or diaphoretic.   HENT:      Head: Normocephalic and atraumatic.      Right Ear: Ear canal and external ear normal.      Left Ear: Ear canal and external ear normal.      Nose: Nose normal. No congestion or rhinorrhea.      Mouth/Throat:      Mouth: Mucous membranes are moist.      Pharynx: No oropharyngeal exudate.   Eyes:      General: No scleral icterus.        Right eye: No discharge.         Left eye: No discharge.      Extraocular Movements: Extraocular movements intact.      Conjunctiva/sclera: Conjunctivae normal.      Pupils: Pupils are equal, round, and reactive to light.   Neck:      Musculoskeletal: Normal range of motion and neck supple. No neck rigidity or muscular tenderness.      Thyroid: No thyromegaly.      Vascular: No carotid bruit or JVD.      Trachea: No tracheal deviation.   Cardiovascular:      Rate and Rhythm: Normal rate and regular rhythm.      Pulses: Normal pulses.      Heart sounds: Normal heart sounds. No murmur. No friction rub. No gallop.    Pulmonary:      Effort: Pulmonary effort is normal. No respiratory distress.      Breath sounds: Normal breath sounds. No stridor. No wheezing, rhonchi or rales.   Chest:      Chest wall: No tenderness.   Abdominal:      General: Bowel sounds are normal. There is no distension.      Palpations: Abdomen is soft. There is no mass.      Tenderness: There is no abdominal tenderness. There is no guarding or rebound.      Hernia: No hernia is present.   Musculoskeletal: Normal range of motion.         General: No swelling, tenderness, deformity or signs of injury.      Right lower leg: No edema.      Left lower leg: No edema.   Lymphadenopathy:      Cervical: No cervical adenopathy.   Skin:     General: Skin is warm and dry.      Coloration: Skin is not jaundiced or pale.      Findings: No bruising, erythema or rash.   Neurological:      General: No focal deficit present.      Mental Status: He  is alert and oriented to person, place, and time. Mental status is at baseline.      Cranial Nerves: No cranial nerve deficit.      Sensory: No sensory deficit.      Motor: No weakness or abnormal muscle tone.      Coordination: Coordination normal.   Psychiatric:         Mood and Affect: Mood normal.         Behavior: Behavior normal.         Thought Content: Thought content normal.         Judgment: Judgment normal.              Wounds (last 24 hours)      LDA Wound     Row Name 01/12/21 0701 01/11/21 1901          Wound 01/1955 Right gluteal    Wound - Properties Group Placement Date: 01/10/21  -CS Placement Time: 1954  -CS Present on Hospital Admission: Y  -CS Side: Right  -CS Location: gluteal  -CS Stage, Pressure Injury : unstageable  -CS    Closure  Open to air  -CR  --     Periwound Temperature  --  warm  -CS     Periwound Skin Turgor  --  soft  -CS     Retired Wound - Properties Group Date first assessed: 01/10/21  -CS Time first assessed: 1954  -CS Present on Hospital Admission: Y  -CS Side: Right  -CS Location: gluteal  -CS      User Key  (r) = Recorded By, (t) = Taken By, (c) = Cosigned By    Initials Name Provider Type    CS Johny Givens, RN Registered Nurse    Eden Ochoa, RN Registered Nurse          Procedures:    Procedure(s):  TOTAL HIP ARTHROPLASTY ANTERIOR WITH HANA TABLE          Results Review:     I reviewed the patient's new clinical results.      Lab Results (last 24 hours)     Procedure Component Value Units Date/Time    Vitamin B12 [[id number]]  (Normal) Collected: 01/12/21 0328    Specimen: Blood Updated: 01/12/21 1234     Vitamin B-12 629 pg/mL     Narrative:      Results may be falsely increased if patient taking Biotin.      Folate [[id number]]  (Abnormal) Collected: 01/12/21 0328    Specimen: Blood Updated: 01/12/21 1234     Folate 2.23 ng/mL     Narrative:      Results may be falsely increased if patient taking Biotin.      Troponin [[id number]]  (Normal) Collected:  01/12/21 0328    Specimen: Blood Updated: 01/12/21 0656     Troponin T <0.010 ng/mL     Narrative:      Troponin T Reference Range:  <= 0.03 ng/mL-   Negative for AMI  >0.03 ng/mL-     Abnormal for myocardial necrosis.  Clinicians would have to utilize clinical acumen, EKG, Troponin and serial changes to determine if it is an Acute Myocardial Infarction or myocardial injury due to an underlying chronic condition.       Results may be falsely decreased if patient taking Biotin.      Comprehensive Metabolic Panel [[id number]]  (Abnormal) Collected: 01/12/21 0328    Specimen: Blood Updated: 01/12/21 0516     Glucose 95 mg/dL      BUN 10 mg/dL      Creatinine 0.47 mg/dL      Sodium 134 mmol/L      Potassium 3.8 mmol/L      Chloride 100 mmol/L      CO2 25.0 mmol/L      Calcium 8.2 mg/dL      Total Protein 6.1 g/dL      Albumin 2.60 g/dL      ALT (SGPT) 15 U/L      AST (SGOT) 38 U/L      Alkaline Phosphatase 100 U/L      Total Bilirubin 3.0 mg/dL      eGFR Non African Amer >150 mL/min/1.73      Globulin 3.5 gm/dL      A/G Ratio 0.7 g/dL      BUN/Creatinine Ratio 21.3     Anion Gap 9.0 mmol/L     Narrative:      GFR Normal >60  Chronic Kidney Disease <60  Kidney Failure <15      MRSA Screen, PCR (Inpatient) - Swab, Nares [[id number]]  (Normal) Collected: 01/11/21 1502    Specimen: Swab from Nares Updated: 01/11/21 1737     MRSA PCR No MRSA Detected        No results found for: HGBA1C  Results from last 7 days   Lab Units 01/11/21  0333   INR  1.54*           No results found for: LIPASE  No results found for: CHOL, CHLPL, TRIG, HDL, LDL, LDLDIRECT    No results found for: INTRAOP, PREDX, FINALDX, COMDX    Microbiology Results (last 10 days)     Procedure Component Value - Date/Time    MRSA Screen, PCR (Inpatient) - Swab, Nares [[id number]]  (Normal) Collected: 01/11/21 1502    Lab Status: Final result Specimen: Swab from Nares Updated: 01/11/21 1737     MRSA PCR No MRSA Detected    COVID PRE-OP / PRE-PROCEDURE SCREENING ORDER  (NO ISOLATION) - Swab, Nasopharynx [[id number]]  (Normal) Collected: 01/10/21 1634    Lab Status: Final result Specimen: Swab from Nasopharynx Updated: 01/10/21 1729    Narrative:      The following orders were created for panel order COVID PRE-OP / PRE-PROCEDURE SCREENING ORDER (NO ISOLATION) - Swab, Nasopharynx.  Procedure                               Abnormality         Status                     ---------                               -----------         ------                     COVID-19,CEPHEID,COR/ELLIE...[[id number]]  Normal              Final result                 Please view results for these tests on the individual orders.    COVID-19,CEPHEID,COR/ELLIE/PAD IN-HOUSE(OR EMERGENT/ADD-ON),NP SWAB IN TRANSPORT MEDIA 3-4 HR TAT - Swab, Nasopharynx [[id number]]  (Normal) Collected: 01/10/21 1634    Lab Status: Final result Specimen: Swab from Nasopharynx Updated: 01/10/21 1729     COVID19 Not Detected    Narrative:      Fact sheet for providers: https://www.fda.gov/media/125807/download     Fact sheet for patients: https://www.fda.gov/media/041382/download          ECG/EMG Results (most recent)     Procedure Component Value Units Date/Time    ECG 12 Lead [[id number]] Collected: 01/11/21 1525     Updated: 01/11/21 1527     QT Interval 378 ms     Narrative:      HEART RATE= 94  bpm  RR Interval= 636  ms  MA Interval= 164  ms  P Horizontal Axis= 23  deg  P Front Axis= 13  deg  QRSD Interval= 97  ms  QT Interval= 378  ms  QRS Axis= -13  deg  T Wave Axis= -57  deg  - ABNORMAL ECG -  Sinus rhythm  Probable anterior infarct, age indeterminate  No previous ECG available for comparison  Electronically Signed By:   Date and Time of Study: 2021-01-11 15:25:50    Adult Transthoracic Echo Complete W/ Cont if Necessary Per Protocol [[id number]] Collected: 01/12/21 0938     Updated: 01/12/21 1330     BSA 2.0 m^2      Ao root diam 3.2 cm      Ao root area 8.0 cm^2      ACS 1.9 cm      Ao root area (BSA corrected) 1.6     MV E max shell  75.3 cm/sec      MV A max shell 114.1 cm/sec      MV E/A 0.66     MV V2 max 113.2 cm/sec      MV max PG 5.1 mmHg      MV V2 mean 71.0 cm/sec      MV mean PG 2.3 mmHg      MV V2 VTI 23.8 cm      Ao pk shell 131.7 cm/sec      Ao max PG 6.9 mmHg      Ao max PG (full) 3.3 mmHg      Ao V2 mean 92.0 cm/sec      Ao mean PG 3.8 mmHg      Ao mean PG (full) 1.6 mmHg      Ao V2 VTI 24.5 cm      LV V1 max PG 3.6 mmHg      LV V1 mean PG 2.2 mmHg      LV V1 max 95.0 cm/sec      LV V1 mean 69.8 cm/sec      LV V1 VTI 18.6 cm      SV(Ao) 196.7 ml      SI(Ao) 96.6 ml/m^2      TR max shell 13.1 cm/sec      RVSP(TR) 3.1 mmHg      RAP systole 3.0 mmHg       CV ECHO TY - BZI_BMI 24.4 kilograms/m^2       CV ECHO TY - BSA(HAYCOCK) 2.0 m^2       CV ECHO TY - BZI_METRIC_WEIGHT 81.6 kg       CV ECHO TY - BZI_METRIC_HEIGHT 182.9 cm                     Ct Abdomen Pelvis Without Contrast    Result Date: 1/10/2021   1. Diffuse abdominal ascites is noted thought secondary to cirrhosis with secondary signs of splenomegaly and portal hypertension with prominent mesenteric vessels and varices suspected. In addition there is left-sided pleural effusion 2. Subcapital fracture of the right hip 3. Incidental note is made of gallstones within the gallbladder    Electronically Signed By-Gee Davison MD On:1/10/2021 3:49 PM This report was finalized on [id number] by  Gee Davison MD.    Ct Head Without Contrast    Result Date: 1/10/2021   1. There is no acute edema hemorrhage or obvious mass effect. 2. Chronic sinusitis is noted involving the left maxillary and ethmoid sinuses. 3. Calvarium is grossly intact.   Brain MRI is more sensitive to evaluate for acute or subacute infarcts and to evaluate for intracranial metastatic disease.  Electronically Signed By-Gee Davison MD On:1/10/2021 3:38 PM This report was finalized on [id number] by  Gee Davison MD.    Ct Cervical Spine Without Contrast    Result Date: 1/10/2021   1.  No obvious fracture or subluxation 2. Multilevel degenerative disc and degenerative changes are identified. 3. Incidental note is made of left pleural effusion. The cause is not readily apparent on this exam  Electronically Signed By-Gee Davison MD On:1/10/2021 3:43 PM This report was finalized on [id number] by  Gee Davison MD.    Xr Chest 1 View    Result Date: 1/11/2021  Moderate size layering left pleural fluid collection with left basilar airspace disease favoring atelectasis. There is mild diffuse pulmonary edema  Electronically Signed By-Phuc Diaz MD On:1/11/2021 2:31 PM This report was finalized on [id number] by  Phuc Diaz MD.    Xr Chest 1 View    Result Date: 1/10/2021   1. There is a moderate left-sided pleural effusion with underlying chronic changes.   Electronically Signed By-Gee Davison MD On:1/10/2021 4:47 PM This report was finalized on [id number] by  Gee Davison MD.    Xr Hip With Or Without Pelvis 2 - 3 View Right    Result Date: 1/10/2021   1. Minimally displaced subcapital fracture the right hip is noted.  Electronically Signed By-Gee Davison MD On:1/10/2021 4:47 PM This report was finalized on [id number] by  Gee Davison MD.          Xrays, labs reviewed personally by physician.    Medication Review:   I have reviewed the patient's current medication list      Scheduled Meds  ceFAZolin, 2 g, Intravenous, Once  gabapentin, 600 mg, Oral, Once  [MAR Hold] sodium chloride, 3 mL, Intravenous, Q12H  tranexamic acid, 1,000 mg, Intravenous, Once  tranexamic acid, 1,000 mg, Intravenous, Once        Meds Infusions       Meds PRN  •  acetaminophen **OR** acetaminophen  •  flumazenil  •  [MAR Hold] HYDROcodone-acetaminophen  •  HYDROcodone-acetaminophen  •  [MAR Hold] HYDROmorphone  •  ketorolac  •  LORazepam  •  meperidine  •  morphine  •  naloxone  •  [MAR Hold] nitroglycerin  •  [MAR Hold] ondansetron **OR** [MAR Hold] ondansetron  •  ondansetron  •   promethazine **OR** promethazine  •  [COMPLETED] Insert peripheral IV **AND** [MAR Hold] sodium chloride  •  [MAR Hold] sodium chloride        Assessment/Plan   Assessment/Plan     Active Hospital Problems    Diagnosis  POA   • **Closed fracture of hip (CMS/HCC) [S72.009A]  Yes      Resolved Hospital Problems   No resolved problems to display.       MEDICAL DECISION MAKING COMPLEXITY BY PROBLEM:     Assessment and plan:     Minimally displaced subcapsular fracture of the right hip status post mechanical fall  -Analgesics ordered  -Orthopedic surgery consulted  - Plan for surgery today noted after cardiac clearance.     EtOH dependence  -follow CIWA protocol prn w/d symptoms     Incidental findings of cirrhosis, portal hypertension, gallstones, splenomegaly and ascites      Hyponatremia, mild, monitor        VTE Prophylaxis -     VTE Prophylaxis -   Mechanical Order History:      Ordered        01/10/21 1935  Place Venous Foot Pump  Once         01/10/21 1935  Maintain Venous Foot Pump  Once                 Pharmalogical Order History:     None            Code Status -   Code Status and Medical Interventions:   Ordered at: 01/10/21 1812     Code Status:    CPR     Medical Interventions (Level of Support Prior to Arrest):    Full       This patient has been examined wearing appropriate Personal Protective Equipment and discussed with hospital infection control department. 01/12/21        Discharge Planning          Electronically signed by Trevor Juárez MD, 01/12/21, 15:19 EST.  [facility] Hospitalist Team

## 2021-01-12 NOTE — PLAN OF CARE
Goal Outcome Evaluation:  Plan of Care Reviewed With: patient  Progress: no change  Outcome Summary: pt rested comfortably throughout shift, pt expressed no complaints, pt to go to surgery for total right hip arthroplasty at 1800 01/12/2021, will continue to monitor.

## 2021-01-12 NOTE — PROGRESS NOTES
Discharge Planning Assessment   [place]     Patient Name: Bereket Mckay  MRN: [id number]  Today's Date: 1/12/2021    Admit Date: 1/10/2021          Plan    Plan Comments  encouraged daughter to call [facility] for patient for primary md at discharge; she verbalizes that she will check with them       Carol naegele rn  Case management  Office number [phone]  Cell phone [phone]

## 2021-01-12 NOTE — ANESTHESIA POSTPROCEDURE EVALUATION
Patient: Bereket Mckay    Procedure Summary     Date: 01/12/21 Room / Location: [facility] OR 13 / [facility] MAIN OR    Anesthesia Start: 1548 Anesthesia Stop: 1639    Procedure: TOTAL HIP ARTHROPLASTY ANTERIOR WITH HANA TABLE (Right Hip) Diagnosis:       Closed fracture of hip, unspecified laterality, initial encounter (CMS/[facility])      (Closed fracture of hip, unspecified laterality, initial encounter (CMS/[facility]) [S72.009A])    Surgeon: Bharath Mercado II, MD Provider: Ruben Chavez MD    Anesthesia Type: general ASA Status: 2          Anesthesia Type: general    Vitals  Vitals Value Taken Time   /70 01/12/21 1726   Temp 97 °F (36.1 °C) 01/12/21 1726   Pulse 81 01/12/21 1727   Resp 15 01/12/21 1726   SpO2 100 % 01/12/21 1727   Vitals shown include unvalidated device data.        Post Anesthesia Care and Evaluation    Patient location during evaluation: PACU  Patient participation: complete - patient participated  Level of consciousness: awake  Pain scale: See nurse's notes for pain score.  Pain management: adequate  Airway patency: patent  Anesthetic complications: No anesthetic complications  PONV Status: none  Cardiovascular status: acceptable  Respiratory status: acceptable  Hydration status: acceptable    Comments: Patient seen and examined postoperatively; vital signs stable; SpO2 greater than or equal to 90%; cardiopulmonary status stable; nausea/vomiting adequately controlled; pain adequately controlled; no apparent anesthesia complications; patient discharged from anesthesia care when discharge criteria were met

## 2021-01-12 NOTE — CONSULTS
CARDIOLOGY CONSULT NOTE      Referring Provider: Hospitalist    Reason for Consultation: Cardiac preoperative assessment    Attending: Trevor Juárez MD    Chief complaint    Right hip pain left subcapital fracture secondary to fall    Subjective .     History of present illness:  Bereket Mckay is a 65 y.o. male who presents with no particular cardiac or even general medical history who came to the ED yesterday after falling at home resulting in right subcapital hip fracture.  Although he claims he only drinks on Sundays, he claims he had only had 3 beers yesterday when he simply fell over a space heater at his home.  He denies near syncopal or jacy syncopal episode, rather insists he just tripped and fell at home.  He denies any loss of consciousness associated with the fall.  Unfortunately, Mr. Silvestre has not felt to be particularly stellar historian.    Cardiology was asked to see this patient for preop clearance in view of the fact that his baseline EKG shows poor initial anterior R wave progression which was interpreted by the computer read as possible anterior wall infarction.  On specific questioning, the patient denies any prior history of myocardial infarction or any chest pain or other anginal equivalent symptoms.  He denies PND orthopnea or bipedal edema though has prominent ascites on exam.    On specific questioning, denies any chest pain or other anginal equivalent symptoms and denies PND orthopnea or bipedal edema to suggest congestive heart failure.    He denies other constitutional complaints, and specifically denies any recent fever, cough or known COVID-19 exposure.    Review of Systems   General: denies fever, chills, anorexia, weight loss  Eyes: denies blurring, diplopia  Ear/Nose/Throat: denies ear pain, nosebleeds, hoarseness  Cardiovascular: See HPI  Respiratory: denies excessive sputum, hemoptysis, wheezing  Gastrointestinal: denies nausea, vomiting, change in bowel habits, abdominal pain,  "despite obvious ascites.  Genitourinary: denies dysuria and hematuria  Musculoskeletal: denies back pain, joint pain, joint swelling, muscle cramps.  Admits to muscle mass loss and generalized weakness.  Skin: denies rashes, itching, suspicious lesions  Neurologic: denies focal neuro deficits  Psychiatric: denies depression, anxiety  Endocrine: denies cold intolerance, heat intolerance  Hematologic/Lymphatic: denies abnormal bruising, bleeding  Allergic/Immunologic: denies urticaria or persistent infections      History  History reviewed. No pertinent past medical history.    History reviewed. No pertinent surgical history.    History reviewed. No pertinent family history.    Social History     Tobacco Use   • Smoking status: Former Smoker     Quit date:      Years since quittin.0   • Smokeless tobacco: Current User     Types: Chew   Substance Use Topics   • Alcohol use: Yes     Alcohol/week: 5.0 standard drinks     Types: 5 Cans of beer per week     Comment: drinks 5 beers every     • Drug use: Never        No medications prior to admission.         Sulfa antibiotics    Scheduled Meds:sodium chloride, 3 mL, Intravenous, Q12H      Continuous Infusions:   PRN Meds:.•  HYDROcodone-acetaminophen  •  HYDROmorphone  •  nitroglycerin  •  ondansetron **OR** ondansetron  •  [COMPLETED] Insert peripheral IV **AND** sodium chloride  •  sodium chloride    Objective     VITAL SIGNS  Vitals:    21 0323 21 0825 21 1204 21 1854   BP: 134/75 150/72 134/81 136/78   BP Location: Left arm Right arm Left arm Left arm   Patient Position: Lying Lying Lying Lying   Pulse: 91 92 96 95   Resp: 18 15 16 16   Temp: 97.6 °F (36.4 °C) 98 °F (36.7 °C) 97.8 °F (36.6 °C) 97.7 °F (36.5 °C)   TempSrc: Oral Oral Oral Oral   SpO2: 90% 92% 90% 90%   Weight: 81.6 kg (179 lb 14.3 oz)      Height:           Flowsheet Rows      First Filed Value   Admission Height  182.9 cm (72\") Documented at 01/10/2021 1341 "   Admission Weight  77.1 kg (170 lb) Documented at 01/10/2021 1341           TELEMETRY: Sinus rhythm    Physical Exam:  General Appearance:    Alert, cooperative, in no acute distress   Head:    Normocephalic, without obvious abnormality, atraumatic   Eyes:            Lids and lashes normal, conjunctivae and sclerae normal, no   icterus, no pallor, corneas clear, PERRLA   Ears:    Ears appear intact with no abnormalities noted   Oropharynx:   No oral lesions, no thrush, oral mucosa moist with very poor and mostly missing dentition   Neck:   No adenopathy, supple, trachea midline, no thyromegaly, no   carotid bruit, no JVD   Lungs:     Clear to auscultation,respirations regular, even and                  unlabored    Heart:    Regular rhythm and normal rate, normal S1 and S2, no            murmur, no gallop, no rub, no click   Chest Wall/Thorax:    No abnormalities observed   Abdomen:    Soft without tenderness though prominent ascites and questionable liver edge with probable splenomegaly.   Rectal:     Deferred   Extremities:   Moves all extremities well, no edema, no cyanosis, no             redness, though has prominent muscle mass loss especially upper extremities   Pulses:   Pulses palpable and equal bilaterally   Skin:   No bleeding, bruising or rash   Lymph nodes:   No palpable adenopathy   Neurologic:   Cranial nerves 2 - 12 grossly intact, sensation intact, DTR       present and equal bilaterally        Results Review:    CBC    Results from last 7 days   Lab Units 01/11/21  0334 01/10/21  1438   WBC 10*3/mm3 9.60 7.90   HEMOGLOBIN g/dL 11.8* 12.8*   PLATELETS 10*3/mm3 172 201     BMP   Results from last 7 days   Lab Units 01/11/21  0333 01/10/21  1438   SODIUM mmol/L 135* 134*   POTASSIUM mmol/L 3.8 3.5   CHLORIDE mmol/L 102 100   CO2 mmol/L 23.0 22.0   BUN mg/dL 8 7*   CREATININE mg/dL 0.39* 0.47*   GLUCOSE mg/dL 84 110*     Coag   Results from last 7 days   Lab Units 01/11/21  0333   INR  1.54*   APTT  seconds 38.8*     HbA1C No results found for: HGBA1C  CMP   Results from last 7 days   Lab Units 01/11/21  0333 01/10/21  1438   SODIUM mmol/L 135* 134*   POTASSIUM mmol/L 3.8 3.5   CHLORIDE mmol/L 102 100   CO2 mmol/L 23.0 22.0   BUN mg/dL 8 7*   CREATININE mg/dL 0.39* 0.47*   GLUCOSE mg/dL 84 110*   ALBUMIN g/dL  --  3.00*   BILIRUBIN mg/dL  --  2.6*   ALK PHOS U/L  --  114   AST (SGOT) U/L  --  51*   ALT (SGPT) U/L  --  20       EKG: Sinus rhythm with poor anterior R wave progression and nonspecific repolarization changes.      ECHOCARDIOGRAM: Pending      STRESS MYOVIEW: Pending    CARDIAC CATHETERIZATION: No previous    OTHER: Chest x-ray shows no active disease aside from small left pleural effusion.    Abdominal/pelvic CT shows ascites, splenomegaly and evidence of cirrhosis and portal hypertension, in addition to right subcapital hip fracture      Assessment/Plan     #1--65-year-old WM presents with right subcapital hip fracture  -Scheduled to undergo ORIF tomorrow p.m.    #2--preoperative cardiac assessment  -Patient will be cleared to proceed with elective orthopedic surgery with no clear evidence of acute myocardial infarction, unstable angina pectoris or congestive heart failure  -In view of poor anterior R wave progression and abdominal ascites, will assess anterior wall motion and global contractility and hopefully exclude from significant coronary ischemia by echocardiography and Lexiscan Myoview studies which will be completed in a.m.  -While I anticipate this patient will be cleared to proceed with elective surgery cardiac standpoint.  He still has very substantial comorbidities with evidence of cirrhosis, secondary coagulopathy and suspicion of esophageal varices    #3--former longstanding tobacco abuse; claims he stopped smoking 11 years ago.    #4--advanced alcoholism; extensive clinical stigmata of cirrhotic liver disease, including ascites, splenomegaly, portal hypertension and elevated pro  time, and muscle wasting  -Check LFTs, serum magnesium, B12 and folate      Further recommendations pending completion of noninvasive database including noninvasive cardiac studies.    I discussed the patient's findings and my recommendations with patient and bedside nurse for coordination of care    SHRUTHI Gruber MD  01/11/21  23:16 EST      This report was generated using the Dragon voice recognition system.

## 2021-01-12 NOTE — PROGRESS NOTES
"CARDIOLOGY FOLLOW-UP PROGRESS NOTE      Reason for follow-up:    Preoperative cardiovascular risk assessment     Attending: Trevor Juárez MD      Subjective .     Denies chest pain or dypsnea     Review of Systems   Constitution: Negative for decreased appetite and diaphoresis.   HENT: Negative for congestion, hearing loss and nosebleeds.    Cardiovascular: Negative for chest pain, claudication, dyspnea on exertion, irregular heartbeat, leg swelling, near-syncope, orthopnea, palpitations, paroxysmal nocturnal dyspnea and syncope.   Respiratory: Negative for cough, shortness of breath and sleep disturbances due to breathing.    Endocrine: Negative for polyuria.   Hematologic/Lymphatic: Does not bruise/bleed easily.   Skin: Negative for itching and rash.   Musculoskeletal: Positive for joint pain and muscle weakness. Negative for back pain and myalgias.   Gastrointestinal: Negative for abdominal pain, change in bowel habit and nausea.   Genitourinary: Negative for dysuria, flank pain, frequency and hesitancy.   Neurological: Positive for weakness. Negative for dizziness and tremors.   Psychiatric/Behavioral: Negative for altered mental status. The patient does not have insomnia.        Allergies: Sulfa antibiotics    Scheduled Meds:sodium chloride, 3 mL, Intravenous, Q12H        Continuous Infusions:     PRN Meds:.•  HYDROcodone-acetaminophen  •  HYDROmorphone  •  nitroglycerin  •  ondansetron **OR** ondansetron  •  [COMPLETED] Insert peripheral IV **AND** sodium chloride  •  sodium chloride    Objective     VITAL SIGNS  Patient Vitals for the past 24 hrs:   BP Temp Temp src Pulse Resp SpO2 Height Weight   01/12/21 0935 154/77 -- -- -- -- -- 182.9 cm (72\") 81.6 kg (180 lb)   01/12/21 0337 125/78 97.7 °F (36.5 °C) Oral 84 15 92 % -- 82 kg (180 lb 12.4 oz)   01/11/21 1854 136/78 97.7 °F (36.5 °C) Oral 95 16 90 % -- --   01/11/21 1204 134/81 97.8 °F (36.6 °C) Oral 96 16 90 % -- --        Flowsheet Rows      First " "Filed Value   Admission Height  182.9 cm (72\") Documented at 01/10/2021 1341   Admission Weight  77.1 kg (170 lb) Documented at 01/10/2021 1341          Body mass index is 24.41 kg/m².      Intake/Output Summary (Last 24 hours) at 1/12/2021 0942  Last data filed at 1/12/2021 0607  Gross per 24 hour   Intake 200 ml   Output 325 ml   Net -125 ml        TELEMETRY:     Sr    Physical Exam:  Constitutional:       General: Not in acute distress.     Appearance: Normal appearance. Well-developed.   Eyes:      Pupils: Pupils are equal, round, and reactive to light.   HENT:      Head: Normocephalic and atraumatic.   Neck:      Musculoskeletal: Normal range of motion and neck supple.      Vascular: No JVD.   Pulmonary:      Effort: Pulmonary effort is normal.      Breath sounds: Normal breath sounds.   Cardiovascular:      Normal rate. Regular rhythm.   Pulses:     Intact distal pulses.   Edema:     Peripheral edema absent.   Abdominal:      General: There is no distension.      Palpations: Abdomen is soft.      Tenderness: There is no abdominal tenderness.   Musculoskeletal: Normal range of motion.   Skin:     General: Skin is warm and dry.   Neurological:      Mental Status: Alert and oriented to person, place, and time.            Results Review:   I reviewed the patient's new clinical results.    CBC    Results from last 7 days   Lab Units 01/11/21  0334 01/10/21  1438   WBC 10*3/mm3 9.60 7.90   HEMOGLOBIN g/dL 11.8* 12.8*   PLATELETS 10*3/mm3 172 201     BMP   Results from last 7 days   Lab Units 01/12/21  0328 01/11/21  0333 01/10/21  1438   SODIUM mmol/L 134* 135* 134*   POTASSIUM mmol/L 3.8 3.8 3.5   CHLORIDE mmol/L 100 102 100   CO2 mmol/L 25.0 23.0 22.0   BUN mg/dL 10 8 7*   CREATININE mg/dL 0.47* 0.39* 0.47*   GLUCOSE mg/dL 95 84 110*     Cr Clearance Estimated Creatinine Clearance: 104.8 mL/min (A) (by C-G formula based on SCr of 0.47 mg/dL (L)).  Coag   Results from last 7 days   Lab Units 01/11/21  0333   INR  " 1.54*   APTT seconds 38.8*     HbA1C No results found for: HGBA1C  Blood Glucose No results found for: POCGLU  Infection     CMP   Results from last 7 days   Lab Units 01/12/21  0328 01/11/21  0333 01/10/21  1438   SODIUM mmol/L 134* 135* 134*   POTASSIUM mmol/L 3.8 3.8 3.5   CHLORIDE mmol/L 100 102 100   CO2 mmol/L 25.0 23.0 22.0   BUN mg/dL 10 8 7*   CREATININE mg/dL 0.47* 0.39* 0.47*   GLUCOSE mg/dL 95 84 110*   ALBUMIN g/dL 2.60*  --  3.00*   BILIRUBIN mg/dL 3.0*  --  2.6*   ALK PHOS U/L 100  --  114   AST (SGOT) U/L 38  --  51*   ALT (SGPT) U/L 15  --  20     ABG      UA    Results from last 7 days   Lab Units 01/10/21  1633   NITRITE UA  Negative   WBC UA /HPF 0-2*   BACTERIA UA /HPF None Seen   SQUAM EPITHEL UA /HPF 0-2     KATI  No results found for: POCMETH, POCAMPHET, POCBARBITUR, POCBENZO, POCCOCAINE, POCOPIATES, POCOXYCODO, POCPHENCYC, POCPROPOXY, POCTHC, POCTRICYC  Lysis Labs   Results from last 7 days   Lab Units 01/12/21 0328 01/11/21  0334 01/11/21  0333 01/10/21  1438   INR   --   --  1.54*  --    APTT seconds  --   --  38.8*  --    HEMOGLOBIN g/dL  --  11.8*  --  12.8*   PLATELETS 10*3/mm3  --  172  --  201   CREATININE mg/dL 0.47*  --  0.39* 0.47*     Radiology(recent) Ct Abdomen Pelvis Without Contrast    Result Date: 1/10/2021   1. Diffuse abdominal ascites is noted thought secondary to cirrhosis with secondary signs of splenomegaly and portal hypertension with prominent mesenteric vessels and varices suspected. In addition there is left-sided pleural effusion 2. Subcapital fracture of the right hip 3. Incidental note is made of gallstones within the gallbladder    Electronically Signed By-Gee Davison MD On:1/10/2021 3:49 PM This report was finalized on [id number] by  Gee Davison MD.    Ct Head Without Contrast    Result Date: 1/10/2021   1. There is no acute edema hemorrhage or obvious mass effect. 2. Chronic sinusitis is noted involving the left maxillary and ethmoid sinuses. 3.  Calvarium is grossly intact.   Brain MRI is more sensitive to evaluate for acute or subacute infarcts and to evaluate for intracranial metastatic disease.  Electronically Signed By-Gee Davison MD On:1/10/2021 3:38 PM This report was finalized on [id number] by  Gee Davison MD.    Ct Cervical Spine Without Contrast    Result Date: 1/10/2021   1. No obvious fracture or subluxation 2. Multilevel degenerative disc and degenerative changes are identified. 3. Incidental note is made of left pleural effusion. The cause is not readily apparent on this exam  Electronically Signed By-Gee Davison MD On:1/10/2021 3:43 PM This report was finalized on [id number] by  Gee Davison MD.    Xr Chest 1 View    Result Date: 1/11/2021  Moderate size layering left pleural fluid collection with left basilar airspace disease favoring atelectasis. There is mild diffuse pulmonary edema  Electronically Signed By-Phuc Diaz MD On:1/11/2021 2:31 PM This report was finalized on [id number] by  Phuc Diaz MD.    Xr Chest 1 View    Result Date: 1/10/2021   1. There is a moderate left-sided pleural effusion with underlying chronic changes.   Electronically Signed By-Gee Davison MD On:1/10/2021 4:47 PM This report was finalized on [id number] by  Gee Davison MD.    Xr Hip With Or Without Pelvis 2 - 3 View Right    Result Date: 1/10/2021   1. Minimally displaced subcapital fracture the right hip is noted.  Electronically Signed By-Gee Davison MD On:1/10/2021 4:47 PM This report was finalized on [id number] by  Gee Davison MD.      Imaging Results (Last 24 Hours)     Procedure Component Value Units Date/Time    XR Chest 1 View [[id number]] Collected: 01/11/21 1430     Updated: 01/11/21 1433    Narrative:      Examination: XR CHEST 1 VW-     Date of Exam: 1/11/2021 2:10 PM     Indication: SURGERY; S72.009A-Fracture of unspecified part of neck of  unspecified femur, initial encounter for closed  fracture; F10.10-Alcohol  abuse, uncomplicated; M12-Ndvhlek effusion, not elsewhere classified.     Comparison: 01/10/2021     Technique: 1 view of the chest      Findings:  The heart is enlarged. The pulmonary vascular markings are increased  consistent with pulmonary edema. There is a layering left pleural  effusion. There is left basilar airspace disease which could be  atelectasis or pneumonia.       Impression:      Moderate size layering left pleural fluid collection with left basilar  airspace disease favoring atelectasis. There is mild diffuse pulmonary  edema     Electronically Signed By-Phuc Diaz MD On:1/11/2021 2:31 PM  This report was finalized on [id number] by  Phuc Diaz MD.          Results from last 7 days   Lab Units 01/12/21  0328   TROPONIN T ng/mL <0.010       EKG                I personally viewed and interpreted the patient's EKG/Telemetry data:        ECHOCARDIOGRAM:            STRESS MYOVIEW:      CARDIAC CATHETERIZATION:      OTHER:         Assessment/Plan            Closed fracture of hip (CMS/HCC)        ASSESSMENT:    Preoperative cardiovascular risk assessment requested  Right hip fracture  Tobacco abuse  Alcoholism         PLAN:  Dr. Gruber to review Lexiscan Myoview and echocardiogram which have been completed    Additional recommendations per Dr. Gruber    I discussed the patients findings and my recommendations with patient   TAYE Evans  01/12/21  09:42 EST     Cardiology attending addendum note:     Only seen and examined by me personally; clinical events and data including images were reviewed by me in detail.  I also reviewed for accuracy the progress note scribed for me by BRADLEY KOTHARI above and I concur with clinical exam description as well as assessment and plan as outlined and discussed with her earlier today.    MsShanna joint resting comfortably stable vital signs.  Neck supple without JVD.  Chest grossly symmetric and clear to auscultation with  adequate global airflow.  Heart tones normal.  Abdomen soft nontender.  No peripheral edema.    I personally reviewed for interpretation both echocardiogram and Lexiscan Myoview studies, both of which are unremarkable and satisfactory proceed with anticipated surgical procedure.    We will follow Mr. Mckay perioperatively for any hemodynamic issues should he arise.

## 2021-01-13 LAB
BACTERIA UR QL AUTO: ABNORMAL /HPF
BILIRUB UR QL STRIP: ABNORMAL
CLARITY UR: CLEAR
COLOR UR: ABNORMAL
GLUCOSE UR STRIP-MCNC: NEGATIVE MG/DL
HGB UR QL STRIP.AUTO: ABNORMAL
HYALINE CASTS UR QL AUTO: ABNORMAL /LPF
KETONES UR QL STRIP: ABNORMAL
LEUKOCYTE ESTERASE UR QL STRIP.AUTO: ABNORMAL
MUCOUS THREADS URNS QL MICRO: ABNORMAL /HPF
NITRITE UR QL STRIP: NEGATIVE
PH UR STRIP.AUTO: 5.5 [PH] (ref 5–8)
PROT UR QL STRIP: NEGATIVE
RBC # UR: ABNORMAL /HPF
REF LAB TEST METHOD: ABNORMAL
SP GR UR STRIP: 1.03 (ref 1–1.03)
SQUAMOUS #/AREA URNS HPF: ABNORMAL /HPF
UROBILINOGEN UR QL STRIP: ABNORMAL
WBC UR QL AUTO: ABNORMAL /HPF

## 2021-01-13 PROCEDURE — 97162 PT EVAL MOD COMPLEX 30 MIN: CPT

## 2021-01-13 PROCEDURE — 99232 SBSQ HOSP IP/OBS MODERATE 35: CPT | Performed by: HOSPITALIST

## 2021-01-13 PROCEDURE — 97110 THERAPEUTIC EXERCISES: CPT

## 2021-01-13 PROCEDURE — 25010000002 CEFAZOLIN PER 500 MG: Performed by: ORTHOPAEDIC SURGERY

## 2021-01-13 PROCEDURE — 51798 US URINE CAPACITY MEASURE: CPT

## 2021-01-13 PROCEDURE — 81001 URINALYSIS AUTO W/SCOPE: CPT | Performed by: HOSPITALIST

## 2021-01-13 RX ORDER — TAMSULOSIN HYDROCHLORIDE 0.4 MG/1
0.4 CAPSULE ORAL DAILY
Status: DISCONTINUED | OUTPATIENT
Start: 2021-01-13 | End: 2021-01-15 | Stop reason: HOSPADM

## 2021-01-13 RX ADMIN — TAMSULOSIN HYDROCHLORIDE 0.4 MG: 0.4 CAPSULE ORAL at 21:58

## 2021-01-13 RX ADMIN — CEFAZOLIN SODIUM 2 G: 10 INJECTION, POWDER, FOR SOLUTION INTRAVENOUS at 16:19

## 2021-01-13 RX ADMIN — ASPIRIN 325 MG ORAL TABLET 325 MG: 325 PILL ORAL at 08:18

## 2021-01-13 RX ADMIN — CEFAZOLIN SODIUM 2 G: 10 INJECTION, POWDER, FOR SOLUTION INTRAVENOUS at 00:05

## 2021-01-13 RX ADMIN — CEFAZOLIN SODIUM 2 G: 10 INJECTION, POWDER, FOR SOLUTION INTRAVENOUS at 08:17

## 2021-01-13 NOTE — PROGRESS NOTES
Continued Stay Note  [facility]     Patient Name: Bereket Mckay  MRN: [id number]  Today's Date: 1/13/2021    Admit Date: 1/10/2021    Discharge Plan     Row Name 01/13/21 1339       Plan    Plan  D/C Plan: Home with [facility] (accepted, order placed).    Plan Comments   spoke to patient and daughter, Suma at bedside wearing mask and goggles and keeping distance greater than 6 feet and spent less than 15 minutes in room. Daughter states she has scheduled an appt for patient with [facility] for 1/18. Daughter denies any d/c needs at this time. Barrier to D/C: requiring straight cath d/t difficulty voiding.          Expected Discharge Date and Time     Expected Discharge Date Expected Discharge Time    Jan 14, 2021             Donna Nicole

## 2021-01-13 NOTE — DISCHARGE PLACEMENT REQUEST
"Bereket Varner (66 y.o. Male)     Date of Birth Social Security Number Address Home Phone MRN    1955  [address] [address] [phone] [id number]    Yazdanism Marital Status          None Single       Admission Date Admission Type Admitting Provider Attending Provider Department, Room/Bed    1/10/21 Emergency Morales, MD Franck Morrissey Salgram, MD [facility] SURGICAL INPATIENT, 4129/1    Discharge Date Discharge Disposition Discharge Destination                       Attending Provider: Trevor Juárez MD    Allergies: Sulfa Antibiotics    Isolation: None   Infection: None   Code Status: CPR    Ht: 182.9 cm (72\")   Wt: 76.2 kg (167 lb 15.9 oz)    Admission Cmt: None   Principal Problem: Closed fracture of hip (CMS/[facility]) [S72.009A]                 Active Insurance as of 1/10/2021     Primary Coverage     Payor Plan Insurance Group Employer/Plan Group    MEDICARE MEDICARE A & B      Payor Plan Address Payor Plan Phone Number Payor Plan Fax Number Effective Dates    [address] [phone]  1/1/2019 - None Entered    [facility], [facility] [address]       Subscriber Name Subscriber Birth Date Member ID       BEREKET VARNER 1955 [id number]                 Emergency Contacts      (Rel.) Home Phone Work Phone Mobile Phone    LIO NEUMANN (Daughter) [phone] -- [phone]              "

## 2021-01-13 NOTE — NURSING NOTE
Spoke with Daughter, Suma, regarding patient. States that patient has never seen a doctor prior to this admission. Stated that she is unaware of any health hx on this patient. She did state that patient is legally blind.    Questioned patients urinary output.   Daughter states patient has been drowsy, sleeping a lot, and has had low urinary output since admission to the hospital. States the urgency to go is present but measurable output is decreased compared to patients normal.  Patients urine is dark yellow at this time and low output. Daughter is concerned for a UTI. UA has been done on 1/10.        Patient is resting at this time.  Continuing to monitor.

## 2021-01-13 NOTE — NURSING NOTE
Spoke with SILVIA Boswell about patient retaining urine. Voided 50 and bladder scan then stated 518. No prescriptions ordered at this time.   Straight cath PRN at this time and continue to monitor.

## 2021-01-13 NOTE — PLAN OF CARE
Goal Outcome Evaluation:  Plan of Care Reviewed With: patient  Progress: no change  pt denies pain. WBAT to RLE. pt worked with PT today and got up to chair assist X1 and walker. Pt still having difficulty voiding. MD notified. Straight cath with 200 out. Plan to possibly d/c tomorrow with home health.

## 2021-01-13 NOTE — PROGRESS NOTES
Continued Stay Note  [facility]     Patient Name: Bereket Mckay  MRN: [id number]  Today's Date: 1/13/2021    Admit Date: 1/10/2021    Discharge Plan     Row Name 01/13/21 6403       Plan    Plan  D/C Plan: Home with [facility] (accepted, order placed). RW and BSC with [facility] (order placed).    Plan Comments  CM notified by [facility] liaison that patient requested RW and BSC-orders placed and [facility] liaison notified.    Row Name 01/13/21 9771       Plan    Plan  D/C Plan: Home with [facility] (accepted, order placed).    Plan Comments   spoke to patient and daughter, Suma at bedside wearing mask and goggles and keeping distance greater than 6 feet and spent less than 15 minutes in room. Daughter states she has scheduled an appt for patient with [facility] for 1/18. Daughter denies any d/c needs at this time. Barrier to D/C: requiring straight cath d/t difficulty voiding.            Expected Discharge Date and Time     Expected Discharge Date Expected Discharge Time    Jan 14, 2021             Donna Nicole

## 2021-01-13 NOTE — PLAN OF CARE
Goal Outcome Evaluation:  Plan of Care Reviewed With: patient  Progress: no change  Outcome Summary: Patient has been resting since surgery. VSS. PT to assess in AM. WBAT. Straight Cath PRN. Plan of care ongoing.

## 2021-01-13 NOTE — THERAPY EVALUATION
Patient Name: Bereket Mckay  : 1955    MRN: [id number]                              Today's Date: 2021       Admit Date: 1/10/2021    Visit Dx:     ICD-10-CM ICD-9-CM   1. Closed fracture of hip, unspecified laterality, initial encounter (CMS/[facility])  S72.009A 820.8   2. Alcohol abuse  F10.10 305.00   3. Pleural effusion  J90 511.9     Patient Active Problem List   Diagnosis   • Closed fracture of hip (CMS/[facility])     History reviewed. No pertinent past medical history.  Past Surgical History:   Procedure Laterality Date   • TOTAL HIP ARTHROPLASTY Right 2021    Procedure: TOTAL HIP ARTHROPLASTY ANTERIOR WITH HANA TABLE;  Surgeon: Bharath Mercado II, MD;  Location: [facility] MAIN OR;  Service: Orthopedics;  Laterality: Right;     General Information     Row Name 21 1246          Physical Therapy Time and Intention    Document Type  evaluation  -SS     Mode of Treatment  physical therapy  -     Row Name 21 1246          General Information    Patient Profile Reviewed  yes  -SS     Prior Level of Function  independent:;gait;ADL's daughter drives him when needed  -     Existing Precautions/Restrictions  fall  -SS     Row Name 21 1246          Living Environment    Lives With  alone  -     Row Name 21 1246          Home Main Entrance    Number of Stairs, Main Entrance  one  -SS     Row Name 21 1246          Stairs Within Home, Primary    Number of Stairs, Within Home, Primary  none  -SS     Row Name 21 1246          Cognition    Orientation Status (Cognition)  oriented x 4  -SS     Row Name 21 1246          Safety Issues, Functional Mobility    Impairments Affecting Function (Mobility)  pain;strength;endurance/activity tolerance  -SS       User Key  (r) = Recorded By, (t) = Taken By, (c) = Cosigned By    Initials Name Provider Type    SS Alecia Kincaid PT Physical Therapist        Mobility     Row Name 21 1248          Bed Mobility    Bed Mobility   supine-sit  -SS     Supine-Sit [place] (Bed Mobility)  standby assist  -     Assistive Device (Bed Mobility)  bed rails;head of bed elevated  -     Row Name 01/13/21 1248          Sit-Stand Transfer    Sit-Stand [place] (Transfers)  minimum assist (75% patient effort)  -     Assistive Device (Sit-Stand Transfers)  walker, front-wheeled  -     Row Name 01/13/21 1248          Gait/Stairs (Locomotion)    [place] Level (Gait)  minimum assist (75% patient effort);contact guard  -     Assistive Device (Gait)  walker, front-wheeled  -     Distance in Feet (Gait)  25'  -     Deviations/Abnormal Patterns (Gait)  gait speed decreased;antalgic;weight shifting decreased  -     Row Name 01/13/21 1248          Mobility    Extremity Weight-bearing Status  right lower extremity  -     Right Lower Extremity (Weight-bearing Status)  weight-bearing as tolerated (WBAT)  -       User Key  (r) = Recorded By, (t) = Taken By, (c) = Cosigned By    Initials Name Provider Type     Alecia Kincaid, JOAQUIN Physical Therapist        Obj/Interventions     Row Name 01/13/21 1249          Range of Motion Comprehensive    General Range of Motion  no range of motion deficits identified  -[facility] Name 01/13/21 1249          Strength Comprehensive (MMT)    Comment, General Manual Muscle Testing (MMT) Assessment  B LE>3/5  -[facility] Name 01/13/21 1249          Motor Skills    Therapeutic Exercise  -- ankle pumps, quad sets, glute sets, LAQ  -[facility] Name 01/13/21 1249          Balance    Balance Assessment  sitting static balance;standing static balance  -     Static Sitting Balance  WNL  -     Static Standing Balance  mild impairment  -     Row Name 01/13/21 1249          Sensory Assessment (Somatosensory)    Sensory Assessment (Somatosensory)  sensation intact  -       User Key  (r) = Recorded By, (t) = Taken By, (c) = Cosigned By    Initials Name Provider Type    SS Alecia Kincaid, PT Physical  Therapist        Goals/Plan     Row Name 01/13/21 1254          Transfer Goal 1 (PT)    Activity/Assistive Device (Transfer Goal 1, PT)  transfers, all  -SS     [place] Level/Cues Needed (Transfer Goal 1, PT)  modified independence  -SS     Time Frame (Transfer Goal 1, PT)  long term goal (LTG);2 weeks  -SS     Row Name 01/13/21 1254          Gait Training Goal 1 (PT)    Activity/Assistive Device (Gait Training Goal 1, PT)  gait (walking locomotion)  -SS     [place] Level (Gait Training Goal 1, PT)  modified independence  -SS     Distance (Gait Training Goal 1, PT)  150'  -SS     Time Frame (Gait Training Goal 1, PT)  long term goal (LTG);2 weeks  -SS     Row Name 01/13/21 1254          Stairs Goal 1 (PT)    Activity/Assistive Device (Stairs Goal 1, PT)  stairs, all skills;walker, rolling  -SS     [place] Level/Cues Needed (Stairs Goal 1, PT)  modified independence  -SS     Number of Stairs (Stairs Goal 1, PT)  1  -SS     Row Name 01/13/21 1254          Patient Education Goal (PT)    Activity (Patient Education Goal, PT)  HEP NAHOMY protocol  -SS     [place]/Cues/Accuracy (Memory Goal 2, PT)  demonstrates adequately;independent;verbalizes understanding  -SS     Time Frame (Patient Education Goal, PT)  long term goal (LTG);2 weeks  -SS       User Key  (r) = Recorded By, (t) = Taken By, (c) = Cosigned By    Initials Name Provider Type     Alecia Kincaid, PT Physical Therapist        Clinical Impression     Row Name 01/13/21 1250          Pain    Additional Documentation  Pain Scale: FACES Pre/Post-Treatment (Group)  -     Row Name 01/13/21 1250          Pain Scale: FACES Pre/Post-Treatment    Pain: FACES Scale, Pretreatment  2-->hurts little bit  -     Posttreatment Pain Rating  2-->hurts little bit  -     Row Name 01/13/21 1250          Plan of Care Review    Plan of Care Reviewed With  patient  -SS     Outcome Summary  65 y/o M who presented following fall with resultant R hip fracture  now POD 1 R ant NAHOMY. Pt is WBAT. He typically is independent with ADLs and ambulates without AD. He does not drive- daughter drives as needed. This date he tolerated mobility well. performed bed mobility with SBA, transfers with min A, ambulation with min A and RW 25'. He tolerated ther ex well. Anticipate he will be safe to d/c home with HHPT. His daughter plans to temporarily move in with him to help him as needed. PPE: mask, goggles, gloves.  -     Row Name 01/13/21 1250          Therapy Assessment/Plan (PT)    Rehab Potential (PT)  good, to achieve stated therapy goals  -     Criteria for Skilled Interventions Met (PT)  yes;meets criteria  -     Predicted Duration of Therapy Intervention (PT)  until d/c  -     Row Name 01/13/21 1250          Positioning and Restraints    Pre-Treatment Position  in bed  -     Post Treatment Position  chair  -     In Chair  exit alarm on;call light within reach  -       User Key  (r) = Recorded By, (t) = Taken By, (c) = Cosigned By    Initials Name Provider Type     Alecia Kincaid, PT Physical Therapist        Outcome Measures    No documentation.       Physical Therapy Education                 Title: PT OT SLP Therapies (Done)     Topic: Physical Therapy (Done)     Point: Mobility training (Done)     Learning Progress Summary           Patient Acceptance, E, VU by  at 1/13/2021 1255                               User Key     Initials Effective Dates Name Provider Type Discipline     06/19/19 -  Alecia Kincaid PT Physical Therapist PT              PT Recommendation and Plan  Planned Therapy Interventions (PT): balance training, gait training, home exercise program, patient/family education, stair training, strengthening, transfer training  Plan of Care Reviewed With: patient  Outcome Summary: 67 y/o M who presented following fall with resultant R hip fracture now POD 1 R ant NAHOMY. Pt is WBAT. He typically is independent with ADLs and ambulates  without AD. He does not drive- daughter drives as needed. This date he tolerated mobility well. performed bed mobility with SBA, transfers with min A, ambulation with min A and RW 25'. He tolerated ther ex well. Anticipate he will be safe to d/c home with HHPT. His daughter plans to temporarily move in with him to help him as needed. PPE: mask, goggles, gloves.     Time Calculation:   PT Charges     Row Name 01/13/21 1256             Time Calculation    Start Time  0905  -      Stop Time  0939  -      Time Calculation (min)  34 min  -      PT Received On  01/13/21  -      PT - Next Appointment  01/14/21  -      PT Goal Re-Cert Due Date  01/27/21  -         Time Calculation- PT    Total Timed Code Minutes- PT  10 minute(s)  -        User Key  (r) = Recorded By, (t) = Taken By, (c) = Cosigned By    Initials Name Provider Type    SS Alecia Kincaid, PT Physical Therapist        Therapy Charges for Today     Code Description Service Date Service Provider Modifiers Qty    27660847725 HC PT EVAL MOD COMPLEXITY 4 1/13/2021 Alecia Kincaid, PT GP 1    22821731861 HC PT THER PROC EA 15 MIN 1/13/2021 Alecia Kincaid, PT GP 1               Alecia Kincaid PT  1/13/2021

## 2021-01-13 NOTE — PROGRESS NOTES
"      [facility] Medicine Services Daily Progress Note      Hospitalist Team  LOS 3 days      Patient Care Team:  Provider, No Known as PCP - General    Patient Location: 4129/1      Subjective   Subjective   Denies for any new complaint, no nausea or vomiting, no chest pain.  Chief Complaint / Subjective  Chief Complaint   Patient presents with   • Fall         Brief Synopsis of Hospital Course/HPI    Mr. Mckay is a 65 y.o.  presents to [facility] complaining of right hip pain and on going chronic back pain s/p fall. XR in the ER revealed subcapital right hip fracture.  Patient was admitted and orthopedic surgery was consulted.    Date::          ROS      Objective   Objective      Vital Signs  Temp:  [97 °F (36.1 °C)-98.2 °F (36.8 °C)] 98.2 °F (36.8 °C)  Heart Rate:  [70-94] 88  Resp:  [10-17] 17  BP: ()/(49-84) 109/61  Oxygen Therapy  SpO2: 93 %  Pulse Oximetry Type: Continuous  Device (Oxygen Therapy): room air  Flow (L/min): 2  Flowsheet Rows      First Filed Value   Admission Height  182.9 cm (72\") Documented at 01/10/2021 1341   Admission Weight  77.1 kg (170 lb) Documented at 01/10/2021 1341        Intake & Output (last 3 days)       01/10 0701 - 01/11 0700 01/11 0701 - 01/12 0700 01/12 0701 - 01/13 0700 01/13 0701 - 01/14 0700    P.O.  200  240    Total Intake(mL/kg)  200 (2.4)  240 (3.1)    Urine (mL/kg/hr) 550 325 (0.2) 825 (0.5)     Stool   0     Total Output 550 325 825     Net -550 -125 -825 +240            Urine Unmeasured Occurrence   1 x     Stool Unmeasured Occurrence   1 x         Lines, Drains & Airways    Active LDAs     Name:   Placement date:   Placement time:   Site:   Days:    Peripheral IV 01/10/21 1437 Right Forearm   01/10/21    1437    Forearm   1                  Physical Exam:    Physical Exam  Vitals signs and nursing note reviewed.   Constitutional:       General: He is not in acute distress.     Appearance: Normal appearance. He is well-developed. " He is not ill-appearing, toxic-appearing or diaphoretic.   HENT:      Head: Normocephalic and atraumatic.      Right Ear: Ear canal and external ear normal.      Left Ear: Ear canal and external ear normal.      Nose: Nose normal. No congestion or rhinorrhea.      Mouth/Throat:      Mouth: Mucous membranes are moist.      Pharynx: No oropharyngeal exudate.   Eyes:      General: No scleral icterus.        Right eye: No discharge.         Left eye: No discharge.      Extraocular Movements: Extraocular movements intact.      Conjunctiva/sclera: Conjunctivae normal.      Pupils: Pupils are equal, round, and reactive to light.   Neck:      Musculoskeletal: Normal range of motion and neck supple. No neck rigidity or muscular tenderness.      Thyroid: No thyromegaly.      Vascular: No carotid bruit or JVD.      Trachea: No tracheal deviation.   Cardiovascular:      Rate and Rhythm: Normal rate and regular rhythm.      Pulses: Normal pulses.      Heart sounds: Normal heart sounds. No murmur. No friction rub. No gallop.    Pulmonary:      Effort: Pulmonary effort is normal. No respiratory distress.      Breath sounds: Normal breath sounds. No stridor. No wheezing, rhonchi or rales.   Chest:      Chest wall: No tenderness.   Abdominal:      General: Bowel sounds are normal. There is no distension.      Palpations: Abdomen is soft. There is no mass.      Tenderness: There is no abdominal tenderness. There is no guarding or rebound.      Hernia: No hernia is present.   Musculoskeletal: Normal range of motion.         General: No swelling, tenderness, deformity or signs of injury.      Right lower leg: No edema.      Left lower leg: No edema.   Lymphadenopathy:      Cervical: No cervical adenopathy.   Skin:     General: Skin is warm and dry.      Coloration: Skin is not jaundiced or pale.      Findings: No bruising, erythema or rash.   Neurological:      General: No focal deficit present.      Mental Status: He is alert and  oriented to person, place, and time. Mental status is at baseline.      Cranial Nerves: No cranial nerve deficit.      Sensory: No sensory deficit.      Motor: No weakness or abnormal muscle tone.      Coordination: Coordination normal.   Psychiatric:         Mood and Affect: Mood normal.         Behavior: Behavior normal.         Thought Content: Thought content normal.         Judgment: Judgment normal.              Wounds (last 24 hours)      LDA Wound     Row Name 01/13/21 0715 01/13/21 0245 01/12/21 2247       Wound 01/1955 Right gluteal    Wound - Properties Group Placement Date: 01/10/21  -CS Placement Time: 1954  -CS Present on Hospital Admission: Y  -CS Side: Right  -CS Location: gluteal  -CS Stage, Pressure Injury : unstageable  -CS    Dressing Appearance  dry;intact  -HW  dry;intact  -ER  dry;intact  -ER    Closure  REAL  -HW  REAL  -ER  --    Base  dressing in place, unable to visualize  -HW  red;non-blanchable  -ER  --    Drainage Amount  none  -HW  --  --    Retired Wound - Properties Group Date first assessed: 01/10/21  -CS Time first assessed: 1954  -CS Present on Hospital Admission: Y  -CS Side: Right  -CS Location: gluteal  -CS       Wound 01/12/21 1600 Right hip Incision    Wound - Properties Group Placement Date: 01/12/21  -JY Placement Time: 1600  -JY Present on Hospital Admission: N  -JY Side: Right  -JY Location: hip  -JY Primary Wound Type: Incision  -JY    Dressing Appearance  dry;intact;no drainage  -HW  dry;intact  -ER  dry;intact  -ER    Closure  REAL  -HW  REAL  -ER  --    Base  dressing in place, unable to visualize  -HW  --  --    Drainage Amount  none  -HW  --  none  -ER    Retired Wound - Properties Group Date first assessed: 01/12/21  -JY Time first assessed: 1600  -JY Present on Hospital Admission: N  -JY Side: Right  -JY Location: hip  -JY Primary Wound Type: Incision  -JY    Row Name 01/12/21 1930 01/12/21 1754 01/12/21 1740       Wound 01/1955 Right gluteal    Wound -  Properties Group Placement Date: 01/10/21  -CS Placement Time: 1954  -CS Present on Hospital Admission: Y  -CS Side: Right  -CS Location: gluteal  -CS Stage, Pressure Injury : unstageable  -CS    Wound Image  --  Images linked: 1  -CSA  --    Dressing Appearance  open to air  -ER  --  open to air  -CSA    Closure  Open to air  -ER  --  Open to air  -CSA    Base  red;non-blanchable  -ER  --  non-blanchable  -CSA    Periwound Temperature  warm  -ER  --  --    Drainage Amount  none  -ER  --  none  -CSA    Dressing Care  other (see comments) Mepilex  -ER  --  silicone applied  -CSA    Retired Wound - Properties Group Date first assessed: 01/10/21  -CS Time first assessed: 1954  -CS Present on Hospital Admission: Y  -CS Side: Right  -CS Location: gluteal  -CS       Wound 01/12/21 1600 Right hip Incision    Wound - Properties Group Placement Date: 01/12/21  -JY Placement Time: 1600  -JY Present on Hospital Admission: N  -JY Side: Right  -JY Location: hip  -JY Primary Wound Type: Incision  -JY    Dressing Appearance  dry;intact;no drainage  -ER  --  dry;intact;no drainage  -CSA    Closure  REAL  -ER  --  REAL  -CSA    Drainage Amount  none  -ER  --  none  -CSA    Retired Wound - Properties Group Date first assessed: 01/12/21  -JY Time first assessed: 1600  -JY Present on Hospital Admission: N  -JY Side: Right  -JY Location: hip  -JY Primary Wound Type: Incision  -JY    Row Name 01/12/21 1726 01/12/21 1711 01/12/21 1656       Wound 01/1955 Right gluteal    Wound - Properties Group Placement Date: 01/10/21  -CS Placement Time: 1954  -CS Present on Hospital Admission: Y  -CS Side: Right  -CS Location: gluteal  -CS Stage, Pressure Injury : unstageable  -CS    Retired Wound - Properties Group Date first assessed: 01/10/21  -CS Time first assessed: 1954  -CS Present on Hospital Admission: Y  -CS Side: Right  -CS Location: gluteal  -CS       Wound 01/12/21 1600 Right hip Incision    Wound - Properties Group Placement Date:  01/12/21  -JY Placement Time: 1600  -JY Present on Hospital Admission: N  -JY Side: Right  -JY Location: hip  -JY Primary Wound Type: Incision  -JY    Dressing Appearance  dry;intact;no drainage  -AV  dry;intact;no drainage  -AV  dry;intact;no drainage  -AV    Closure  REAL  -AV  REAL  -AV  REAL  -AV    Retired Wound - Properties Group Date first assessed: 01/12/21  -JY Time first assessed: 1600  -JY Present on Hospital Admission: N  -JY Side: Right  -JY Location: hip  -JY Primary Wound Type: Incision  -JY    Row Name 01/12/21 1641 01/12/21 1629          Wound 01/1955 Right gluteal    Wound - Properties Group Placement Date: 01/10/21  -CS Placement Time: 1954  -CS Present on Hospital Admission: Y  -CS Side: Right  -CS Location: gluteal  -CS Stage, Pressure Injury : unstageable  -CS    Retired Wound - Properties Group Date first assessed: 01/10/21  -CS Time first assessed: 1954  -CS Present on Hospital Admission: Y  -CS Side: Right  -CS Location: gluteal  -CS       Wound 01/12/21 1600 Right hip Incision    Wound - Properties Group Placement Date: 01/12/21  -JY Placement Time: 1600  -JY Present on Hospital Admission: N  -JY Side: Right  -JY Location: hip  -JY Primary Wound Type: Incision  -JY    Dressing Appearance  dry;intact;no drainage  -AV  --     Closure  REAL  -AV  --     Dressing Care  --  dressing applied zipline and zehra dressing  -JY     Retired Wound - Properties Group Date first assessed: 01/12/21  -JY Time first assessed: 1600  -JY Present on Hospital Admission: N  -JY Side: Right  -JY Location: hip  -JY Primary Wound Type: Incision  -JY      User Key  (r) = Recorded By, (t) = Taken By, (c) = Cosigned By    Initials Name Provider Type    Sofía Gutierrez RN Registered Nurse    Claudette Kelley RN Registered Nurse    Johny Foster RN Registered Nurse    Dyana Nam, RN Registered Nurse    Brittney Rodriguez RN Registered Nurse    Roseann Gay RN Registered Nurse           Procedures:    Procedure(s):  TOTAL HIP ARTHROPLASTY ANTERIOR WITH HANA TABLE          Results Review:     I reviewed the patient's new clinical results.      Lab Results (last 24 hours)     Procedure Component Value Units Date/Time    Vitamin B12 [[id number]]  (Normal) Collected: 01/12/21 0328    Specimen: Blood Updated: 01/12/21 1234     Vitamin B-12 629 pg/mL     Narrative:      Results may be falsely increased if patient taking Biotin.      Folate [[id number]]  (Abnormal) Collected: 01/12/21 0328    Specimen: Blood Updated: 01/12/21 1234     Folate 2.23 ng/mL     Narrative:      Results may be falsely increased if patient taking Biotin.          No results found for: HGBA1C  Results from last 7 days   Lab Units 01/11/21  0333   INR  1.54*           No results found for: LIPASE  No results found for: CHOL, CHLPL, TRIG, HDL, LDL, LDLDIRECT    No results found for: INTRAOP, PREDX, FINALDX, COMDX    Microbiology Results (last 10 days)     Procedure Component Value - Date/Time    MRSA Screen, PCR (Inpatient) - Swab, Nares [[id number]]  (Normal) Collected: 01/11/21 1502    Lab Status: Final result Specimen: Swab from Nares Updated: 01/11/21 1737     MRSA PCR No MRSA Detected    COVID PRE-OP / PRE-PROCEDURE SCREENING ORDER (NO ISOLATION) - Swab, Nasopharynx [[id number]]  (Normal) Collected: 01/10/21 1634    Lab Status: Final result Specimen: Swab from Nasopharynx Updated: 01/10/21 1729    Narrative:      The following orders were created for panel order COVID PRE-OP / PRE-PROCEDURE SCREENING ORDER (NO ISOLATION) - Swab, Nasopharynx.  Procedure                               Abnormality         Status                     ---------                               -----------         ------                     COVID-19,CEPHEID,COR/ELLIE...[[id number]]  Normal              Final result                 Please view results for these tests on the individual orders.    COVID-19,CEPHEID,COR/ELLIE/PAD IN-HOUSE(OR EMERGENT/ADD-ON),NP  SWAB IN TRANSPORT MEDIA 3-4 HR TAT - Swab, Nasopharynx [[id number]]  (Normal) Collected: 01/10/21 1634    Lab Status: Final result Specimen: Swab from Nasopharynx Updated: 01/10/21 1729     COVID19 Not Detected    Narrative:      Fact sheet for providers: https://www.fda.gov/media/541137/download     Fact sheet for patients: https://www.fda.gov/media/026913/download          ECG/EMG Results (most recent)     Procedure Component Value Units Date/Time    ECG 12 Lead [[id number]] Collected: 01/11/21 1525     Updated: 01/11/21 1527     QT Interval 378 ms     Narrative:      HEART RATE= 94  bpm  RR Interval= 636  ms  AZ Interval= 164  ms  P Horizontal Axis= 23  deg  P Front Axis= 13  deg  QRSD Interval= 97  ms  QT Interval= 378  ms  QRS Axis= -13  deg  T Wave Axis= -57  deg  - ABNORMAL ECG -  Sinus rhythm  Probable anterior infarct, age indeterminate  No previous ECG available for comparison  Electronically Signed By:   Date and Time of Study: 2021-01-11 15:25:50    Adult Transthoracic Echo Complete W/ Cont if Necessary Per Protocol [[id number]] Collected: 01/12/21 0938     Updated: 01/12/21 1330     BSA 2.0 m^2      Ao root diam 3.2 cm      Ao root area 8.0 cm^2      ACS 1.9 cm      Ao root area (BSA corrected) 1.6     MV E max shell 75.3 cm/sec      MV A max shell 114.1 cm/sec      MV E/A 0.66     MV V2 max 113.2 cm/sec      MV max PG 5.1 mmHg      MV V2 mean 71.0 cm/sec      MV mean PG 2.3 mmHg      MV V2 VTI 23.8 cm      Ao pk shell 131.7 cm/sec      Ao max PG 6.9 mmHg      Ao max PG (full) 3.3 mmHg      Ao V2 mean 92.0 cm/sec      Ao mean PG 3.8 mmHg      Ao mean PG (full) 1.6 mmHg      Ao V2 VTI 24.5 cm      LV V1 max PG 3.6 mmHg      LV V1 mean PG 2.2 mmHg      LV V1 max 95.0 cm/sec      LV V1 mean 69.8 cm/sec      LV V1 VTI 18.6 cm      SV(Ao) 196.7 ml      SI(Ao) 96.6 ml/m^2      TR max shell 13.1 cm/sec      RVSP(TR) 3.1 mmHg      RAP systole 3.0 mmHg       CV ECHO TY - BZI_BMI 24.4 kilograms/m^2       CV ECHO TY  - BSA(HAYCOCK) 2.0 m^2      BH CV ECHO TY - BZI_METRIC_WEIGHT 81.6 kg      BH CV ECHO TY - BZI_METRIC_HEIGHT 182.9 cm                     Ct Abdomen Pelvis Without Contrast    Result Date: 1/10/2021   1. Diffuse abdominal ascites is noted thought secondary to cirrhosis with secondary signs of splenomegaly and portal hypertension with prominent mesenteric vessels and varices suspected. In addition there is left-sided pleural effusion 2. Subcapital fracture of the right hip 3. Incidental note is made of gallstones within the gallbladder    Electronically Signed By-Gee Davison MD On:1/10/2021 3:49 PM This report was finalized on [id number] by  Gee Davison MD.    Ct Head Without Contrast    Result Date: 1/10/2021   1. There is no acute edema hemorrhage or obvious mass effect. 2. Chronic sinusitis is noted involving the left maxillary and ethmoid sinuses. 3. Calvarium is grossly intact.   Brain MRI is more sensitive to evaluate for acute or subacute infarcts and to evaluate for intracranial metastatic disease.  Electronically Signed By-Gee Davison MD On:1/10/2021 3:38 PM This report was finalized on [id number] by  Gee Davison MD.    Ct Cervical Spine Without Contrast    Result Date: 1/10/2021   1. No obvious fracture or subluxation 2. Multilevel degenerative disc and degenerative changes are identified. 3. Incidental note is made of left pleural effusion. The cause is not readily apparent on this exam  Electronically Signed By-Gee Davison MD On:1/10/2021 3:43 PM This report was finalized on [id number] by  Gee Davison MD.    Xr Chest 1 View    Result Date: 1/11/2021  Moderate size layering left pleural fluid collection with left basilar airspace disease favoring atelectasis. There is mild diffuse pulmonary edema  Electronically Signed By-Phuc Diaz MD On:1/11/2021 2:31 PM This report was finalized on [id number] by  Phuc Diaz MD.    Xr Chest 1 View    Result Date:  1/10/2021   1. There is a moderate left-sided pleural effusion with underlying chronic changes.   Electronically Signed By-Gee Davison MD On:1/10/2021 4:47 PM This report was finalized on [id number] by  Gee Davison MD.    Xr Hip With Or Without Pelvis 2 - 3 View Right    Result Date: 1/12/2021   1. Unremarkable right hip replacement.  Electronically Signed By-Alea Lopez MD On:1/12/2021 7:13 PM This report was finalized on [id number] by  Alea Lopez MD.    Xr Hip With Or Without Pelvis 2 - 3 View Right    Result Date: 1/10/2021   1. Minimally displaced subcapital fracture the right hip is noted.  Electronically Signed By-Gee Davison MD On:1/10/2021 4:47 PM This report was finalized on [id number] by  Gee Davison MD.          Xrays, labs reviewed personally by physician.    Medication Review:   I have reviewed the patient's current medication list      Scheduled Meds  aspirin, 325 mg, Oral, Daily  ceFAZolin, 2 g, Intravenous, Q8H  sodium chloride, 3 mL, Intravenous, Q12H        Meds Infusions       Meds PRN  HYDROcodone-acetaminophen  •  HYDROcodone-acetaminophen  •  HYDROmorphone  •  nitroglycerin  •  ondansetron **OR** ondansetron  •  [COMPLETED] Insert peripheral IV **AND** sodium chloride  •  sodium chloride        Assessment/Plan   Assessment/Plan     Active Hospital Problems    Diagnosis  POA   • **Closed fracture of hip (CMS/[facility]) [S72.009A]  Yes      Resolved Hospital Problems   No resolved problems to display.       MEDICAL DECISION MAKING COMPLEXITY BY PROBLEM:     Assessment and plan:     Minimally displaced subcapsular fracture of the right hip status post mechanical fall status post Right Anterior Total Hip Arthroplasty  -Analgesics ordered  -Orthopedic surgery consulted  - lovenox sc for dvt prophylaxis.       EtOH dependence  -follow CIWA protocol prn w/d symptoms     Incidental findings of cirrhosis, portal hypertension, gallstones, splenomegaly and ascites       Hyponatremia, mild, monitor        VTE Prophylaxis -     VTE Prophylaxis -   Mechanical Order History:      Ordered        01/10/21 1935  Place Venous Foot Pump  Once         01/10/21 1935  Maintain Venous Foot Pump  Once                 Pharmalogical Order History:     None            Code Status -   Code Status and Medical Interventions:   Ordered at: 01/10/21 1812     Code Status:    CPR     Medical Interventions (Level of Support Prior to Arrest):    Full       This patient has been examined wearing appropriate Personal Protective Equipment and discussed with hospital infection control department. 01/13/21        Discharge Planning          Electronically signed by Trevor Juárez MD, 01/13/21, 10:21 EST.  [facility]ist Team

## 2021-01-14 LAB
BACTERIA UR QL AUTO: ABNORMAL /HPF
BH CV ECHO MEAS - ACS: 1.9 CM
BH CV ECHO MEAS - AO MAX PG (FULL): 3.3 MMHG
BH CV ECHO MEAS - AO MAX PG: 6.9 MMHG
BH CV ECHO MEAS - AO MEAN PG (FULL): 1.6 MMHG
BH CV ECHO MEAS - AO MEAN PG: 3.8 MMHG
BH CV ECHO MEAS - AO ROOT AREA (BSA CORRECTED): 1.6
BH CV ECHO MEAS - AO ROOT AREA: 8 CM^2
BH CV ECHO MEAS - AO ROOT DIAM: 3.2 CM
BH CV ECHO MEAS - AO V2 MAX: 131.7 CM/SEC
BH CV ECHO MEAS - AO V2 MEAN: 92 CM/SEC
BH CV ECHO MEAS - AO V2 VTI: 24.5 CM
BH CV ECHO MEAS - BSA(HAYCOCK): 2 M^2
BH CV ECHO MEAS - BSA: 2 M^2
BH CV ECHO MEAS - BZI_BMI: 24.4 KILOGRAMS/M^2
BH CV ECHO MEAS - BZI_METRIC_HEIGHT: 182.9 CM
BH CV ECHO MEAS - BZI_METRIC_WEIGHT: 81.6 KG
BH CV ECHO MEAS - LV MAX PG: 3.6 MMHG
BH CV ECHO MEAS - LV MEAN PG: 2.2 MMHG
BH CV ECHO MEAS - LV V1 MAX: 95 CM/SEC
BH CV ECHO MEAS - LV V1 MEAN: 69.8 CM/SEC
BH CV ECHO MEAS - LV V1 VTI: 18.6 CM
BH CV ECHO MEAS - MV A MAX VEL: 114.1 CM/SEC
BH CV ECHO MEAS - MV E MAX VEL: 75.3 CM/SEC
BH CV ECHO MEAS - MV E/A: 0.66
BH CV ECHO MEAS - MV MAX PG: 5.1 MMHG
BH CV ECHO MEAS - MV MEAN PG: 2.3 MMHG
BH CV ECHO MEAS - MV V2 MAX: 113.2 CM/SEC
BH CV ECHO MEAS - MV V2 MEAN: 71 CM/SEC
BH CV ECHO MEAS - MV V2 VTI: 23.8 CM
BH CV ECHO MEAS - RAP SYSTOLE: 3 MMHG
BH CV ECHO MEAS - RVSP: 3.1 MMHG
BH CV ECHO MEAS - SI(AO): 96.6 ML/M^2
BH CV ECHO MEAS - SV(AO): 196.7 ML
BH CV ECHO MEAS - TR MAX VEL: 13.1 CM/SEC
BILIRUB UR QL STRIP: NEGATIVE
CLARITY UR: CLEAR
COLOR UR: ABNORMAL
GLUCOSE UR STRIP-MCNC: NEGATIVE MG/DL
HGB UR QL STRIP.AUTO: ABNORMAL
HYALINE CASTS UR QL AUTO: ABNORMAL /LPF
KETONES UR QL STRIP: NEGATIVE
LEUKOCYTE ESTERASE UR QL STRIP.AUTO: ABNORMAL
NITRITE UR QL STRIP: NEGATIVE
PH UR STRIP.AUTO: 6 [PH] (ref 5–8)
PROT UR QL STRIP: NEGATIVE
QT INTERVAL: 378 MS
RBC # UR: ABNORMAL /HPF
REF LAB TEST METHOD: ABNORMAL
SP GR UR STRIP: 1.02 (ref 1–1.03)
SQUAMOUS #/AREA URNS HPF: ABNORMAL /HPF
UROBILINOGEN UR QL STRIP: ABNORMAL
WBC UR QL AUTO: ABNORMAL /HPF

## 2021-01-14 PROCEDURE — 81001 URINALYSIS AUTO W/SCOPE: CPT | Performed by: HOSPITALIST

## 2021-01-14 PROCEDURE — 25010000002 CEFTRIAXONE PER 250 MG: Performed by: HOSPITALIST

## 2021-01-14 PROCEDURE — 99232 SBSQ HOSP IP/OBS MODERATE 35: CPT | Performed by: HOSPITALIST

## 2021-01-14 PROCEDURE — 0TJB8ZZ INSPECTION OF BLADDER, VIA NATURAL OR ARTIFICIAL OPENING ENDOSCOPIC: ICD-10-PCS | Performed by: UROLOGY

## 2021-01-14 RX ADMIN — TAMSULOSIN HYDROCHLORIDE 0.4 MG: 0.4 CAPSULE ORAL at 21:59

## 2021-01-14 RX ADMIN — CEFTRIAXONE SODIUM 1 G: 1 INJECTION, POWDER, FOR SOLUTION INTRAMUSCULAR; INTRAVENOUS at 12:25

## 2021-01-14 RX ADMIN — ASPIRIN 325 MG ORAL TABLET 325 MG: 325 PILL ORAL at 09:29

## 2021-01-14 RX ADMIN — Medication 3 ML: at 09:30

## 2021-01-14 NOTE — PROGRESS NOTES
"Patient doing well. Mobilizing adequately for POD1. Plan is for DC home. OK from my standpoint possibly tomorrow vs Friday.     R \"Elie\" Rogelio BUCK MD  Orthopaedic Surgery  [facility]  [phone] - [place] Office  [phone] - [place] Office    "

## 2021-01-14 NOTE — PLAN OF CARE
Goal Outcome Evaluation:  Plan of Care Reviewed With: patient  Progress: no change     Patient resting quietly with eyes closed. Bladder scan showed 349 in bladder, but patient unable to void when gotten up to BSC. Patient straight cathed with 200 out. Spoke with patient's daughter last evening. She is asking if a urologist can be consulted. She also asked for PSA test to be done. V/S stable. Will continue to monitor.

## 2021-01-14 NOTE — DISCHARGE INSTRUCTIONS
Total Hip  Discharge Instructions  Dr. DANICA Soni” Ong II  [phone]    • INCISION CARE  o Wash your hands prior to dressing changes  o NENA Wound VAC: Postoperatively you had a NENA Wound Vac placed on the incision. This was placed under sterile conditions in the operating room. It remains in place for 7 days postoperatively. After 7 days, the entire dressing must be removed, including all of the sticky adhesive. The dressing and battery pack provide gentle suction to the incision and provide several benefits over a traditional dressing:  - It maintains the sterile environment of the OR and reduces the risk of infection  - The suction removes unwanted buildup of blood/hematoma under the skin to reduce swelling  - The suction also promotes fresh blood supply to the skin and soft tissue to speed up healing  - The postoperative scar is reduced in size  - Showering is permitted immediately after surgery, but the battery pack must be protected or removed during the shower.   o After 7 days the NENA Wound Vac is removed. If there is no drainage, no dressing is required. If there is some scant drainage a dry bandage can be applied and changed daily until seen in the office or until the drainage stops.   o No creams or ointments to the incisions until 4 weeks post op.  o Do not touch or pick at the incision  o Check incision every day and notify surgeon immediately if any of the following signs or symptoms are seen:  - Increase in redness  - Increase in swelling around the incision and of the entire extremity  - Increase in pain  - NEW drainage or oozing from the incision  - Pulling apart of the edges of the incision  - Increase in overall body temperature (greater than 100.4 degrees)  o Zip-Line: your incision was closed with a state of the art device.   - Is a non-invasive and easy to use wound closure device that replaces sutures, staples and glue for surgical incisions  - It minimizes scarring and eliminating  “railroad” marks that come with staples or sutures  - It makes removal as atraumatic as peeling off a bandage  - Can be removed at home or by a physical therapist or nurse at 14 days postoperatively    • ACTIVITIES  o Exercises:  - Physical therapy will begin immediately while in the hospital. Patients going to a nursing home will get therapy as part of their care at the SNU/SNF facility. Patients going home may also have a therapist come to the house to help them mobilize until they can safely get to an outpatient therapy facility.  - Elevate the affected leg most of the day during the first week post operatively. Caution must be taken to avoid pillow placement directly under the heel of the leg, as this can cause pressure ulcers even with a soft pillow. All pillows and blankets should be placed underneath of the thigh and calf so that the heel is free-floating.  - Use cold packs for 20-30 minutes approximately 5 times per day.  - You should perform the daily stretching and strengthening exercises as taught by the therapist as often as possible. This can be done many times a day.  - Full weight bearing is allowed after surgery. It will be sore/painful to put weight on the leg, but this will help the bone to heal and prevent complications such as pneumonia, bed sores and blood clots. Mobilization is vital to the recovery process.  o Activities of Daily Living:  - No tub baths, hot tubs, or swimming pools for 4 weeks.  - May shower and let water run over the incision immediately after surgery. The battery pack of the NENA Wound Vac must be protected or removed while in the shower. After the NENA is removed 7 days after surgery showering is permitted as long as there is no drainage from the wound.     • Restrictions  o Weight: It is ok to allow full weight bearing after surgery. Weight on the leg actually quickens the recovery process. While it will be sore/painful to put weight on the leg, it is safe to do so. Hip  replacement after hip fracture has a much slower recover process. It can take months to heal fully from a hip fracture and patients even make some slow benefits up to a year afterwards.   o Driving: Many patients have questions about when it is safe to return to driving. The answer is that this is extremely variable. It depends on the extent of the surgery, as well as how quickly you heal. Certainly left leg surgeries make returning to driving easier while right leg surgeries require more extensive rehabilitation before driving can be safe. Until you can press down on the brake hard, and are off of all narcotics, driving is not permitted. Your surgeon cannot “clear” you to return to driving, only you can make the decision when you feel it is safe.    • Medications  o Anticoagulants: After upper extremity surgery most patients do not require an anticoagulant unless you have another injury that will be keeping you from mobilizing. Lower extremity surgery typically does require use of an anticoagulation medicine.   - IF YOU HAD LOWER EXTREMITY SURGERY AND ARE NOT DISCHARGED HOME WITH ANY ANTICOAGULANT MEDICINE YOU SHOULD TAKE ASPIRIN 325mg DAILY FOR 30 DAYS POSTOPERATIVELY.  - If you are discharged home with an anticoagulant such as Aspirin, Xarelto, Eliquis, Coumadin, or Lovenox, follow these simple instructions:   - Notify surgeon immediately if any jacy bleeding is noted in the urine, stool, emesis, or from the nose or the incision. Blood in the stool will often appear as black rather than red. Blood in urine may appear as pink. Blood in emesis may appear as brown/black like coffee grounds.  - You will need to apply pressure for longer periods of time to any cuts or abrasions to stop bleeding  - Avoid alcohol while taking anticoagulants  - Most anticoagulants are to be taken for 30 days postoperatively. After this time, you may stop using them unless instructed otherwise.   - If you were already taking an  anticoagulant (commonly Aspirin, Coumadin, or Plavix) you will likely be resuming your normal dose postoperatively and will be continuing that medication at the discretion of the prescribing physician.  o Stool Softeners: You will be at greater risk of constipation after surgery due to being less mobile and the pain medications.  - Take stool softeners as needed. Over the counter Colace 100 mg 1-2 capsules twice daily can be taken.  - If stools become too loose or too frequent, please decreases the dosage or stop the stool softener.  - If constipation occurs despite use of stool softeners, you are to continue the stool softeners and add a laxative (Milk of Magnesia 1 ounce daily as needed)  - Drink plenty of fluids, and eat fruits and vegetables during your recovery time. Getting up and mobilizing will help the bowels to recover their regular function, as will weaning off of all narcotics when the pain becomes tolerable.  o Pain Medications: Utilized after surgery are narcotics. This is some general information about these medications.  - CLASSIFICATION: Pain medications are called Opioids and are narcotics  - LEGALITIES: It is illegal to share narcotics with others  - DRIVING: it is illegal to drive while under the influence of narcotics. Doing so is a DUI.  - POTENTIAL SIDE EFFECTS: nausea, vomiting, itching, dizziness, drowsiness, dry mouth, constipation, and difficulty urinating.  - POTENTIAL ADVERSE EFFECTS:  - Opioid tolerance can develop with use of pain medications and this simply means that it requires more and more of the medication to control pain. However, this is seen more in patients that use opioids for longer periods of time.  - Opioid dependence can develop with use of Opioids. People with opioid dependence will experience withdrawal symptoms upon cessation of the medication.  - Opioid addiction can develop with use of Opioids. The incidence of this is very unlikely in patients who take the  medications as ordered and stop the medications as instructed.  - Opioid overdose can be dangerous, but is unlikely when the medication is taken as ordered and stopped when ordered. It is important not to mix opioids with alcohol as this can lead to over sedation and respiratory difficulty.  - DOSAGE:  - After the initial surgical pain begins to resolve, you may begin to decrease the pain medication. By the end of a few weeks, you should be off of pain medications.  - Refills will not be given by the office during evening hours, on weekends, or after 6 weeks post-op. You are responsible for weaning off of pain medication. You can increase the time between narcotic pills, taking one every 4 then 6 then 8 hours and so on.  - To seek refills on pain medications during the initial 6-week post-operative period, you must call the office to request the refill. The office will then notify you when to  the prescription. DO NOT wait until you are out of the medication to request a refill. Prescriptions will not be filled over the weekend and depending on the schedule, it may take a couple days for the prescription to be available. Someone will have to pick the prescription in person at the office.    • FOLLOW-UP VISITS  o You will need to follow up in the office with your surgeon in 3 weeks, or as instructed elsewhere in your discharge paperwork. Please call this number [phone] to schedule this appointment. If you are going to an SNF/SNU facility, they will arrange for you to follow up in the office.  o If you have any concerns or suspected complications prior to your follow up visit, please call the office. Do not wait until your appointment time if you suspect complications. These will need to be addressed in the office promptly.      Bharath Mercado II, MD  Orthopaedic Surgery  [facility]

## 2021-01-14 NOTE — OP NOTE
OP Note  Preop Dx: Urinary retention   BPH  Postop Dx: Dx same  Procedure:  Cystoscopy  Surgeon: Bharath White  Anesthesia:  Local  Complications: None  Findings: Revealed a slightly enlarged prostate measured about 45 mm in length.  He is to go home with a catheter and Flomax.  We will see him for a voiding trial next week.  He may benefit from a UroLift to help him urinate better.  Discussed with patient and daughter all risk benefits and options they may be willing to proceed at some point    Estimated blood loss none     Description of procedure:  Patient was at his bedside and his groin areas prepped draped usual sterile fashion.  Flexible cystoscopy was carried out finding a slightly enlarged prostate measuring about 45 mm in length.  His bladder was somewhat trabeculated but otherwise normal.  The scope was removed and the patient tolerated procedure well.  The plan is as above

## 2021-01-14 NOTE — CONSULTS
Urology Consult Note    Patient:Bereket Mkcay :1955  Room:[facility]  Admit Date1/10/2021  Age:66 y.o.     SEX:male     DOS:2021     MR:[id number]     Visit:[id number]       Attending: Trevor Juárez MD  Referring Provider: Dr Juárez  Reason for Consultation: Urinary retention    Patient Care Team:  Provider, No Known as PCP - General    Chief complaint cannot empty bladder    Subjective .     History of present illness: Patient is a 66-year-old white male with multiple medical problems who was admitted with a right hip fracture and was found to be in urinary retention.  He has some severe urinary retention of over 300 to 400cc.  He has been started on Flomax and is still unable to void    Review of Systems  12 point review of systems were reviewed and are negative except for what is in HPI.    History  History reviewed. No pertinent past medical history.  Past Surgical History:   Procedure Laterality Date   • TOTAL HIP ARTHROPLASTY Right 2021    Procedure: TOTAL HIP ARTHROPLASTY ANTERIOR WITH HANA TABLE;  Surgeon: Bharath Mercado II, MD;  Location: [facility];  Service: Orthopedics;  Laterality: Right;     Social History     Socioeconomic History   • Marital status: Single     Spouse name: Not on file   • Number of children: Not on file   • Years of education: Not on file   • Highest education level: Not on file   Tobacco Use   • Smoking status: Former Smoker     Quit date:      Years since quittin.0   • Smokeless tobacco: Current User     Types: Chew   Substance and Sexual Activity   • Alcohol use: Yes     Alcohol/week: 5.0 standard drinks     Types: 5 Cans of beer per week     Comment: drinks 5 beers every     • Drug use: Never     History reviewed. No pertinent family history.  Allergies   Allergen Reactions   • Sulfa Antibiotics Unknown - High Severity     Prior to Admission medications    Not on File         Objective     tMax 24 hours:  Temp (24hrs), Av.1  °F (36.7 °C), Min:97.9 °F (36.6 °C), Max:98.4 °F (36.9 °C)    Vital Sign Ranges:  Temp:  [97.9 °F (36.6 °C)-98.4 °F (36.9 °C)] 98.1 °F (36.7 °C)  Heart Rate:  [75-89] 79  Resp:  [15-18] 16  BP: ()/(52-74) 101/65  Intake and Output Last 3 Shifts:  I/O last 3 completed shifts:  In: 360 [P.O.:360]  Out: 1225 [Urine:1225]      Physical Exam:     General Appearance:    Alert, cooperative, in no acute distress   Head:    Normocephalic, without obvious abnormality, atraumatic   Eyes:            Lids and lashes normal, conjunctivae and sclerae normal, no   icterus, no pallor, corneas clear, PERRLA   Ears:    Ears appear intact with no abnormalities noted   Throat:   No oral lesions, no thrush, oral mucosa moist   Neck:   No adenopathy, supple, trachea midline, no thyromegaly, no   carotid bruit, no JVD   Back:     No kyphosis present, no scoliosis present, no skin lesions,      erythema or scars, no tenderness to percussion or                   palpation,   range of motion normal   Lungs:     Clear to auscultation,respirations regular, even and                  unlabored    Heart:    Regular rhythm and normal rate, normal S1 and S2, no            murmur, no gallop, no rub, no click   Chest Wall:    No abnormalities observed   Abdomen:     Normal bowel sounds, no masses, no organomegaly, soft        non-tender, non-distended, no guarding, no rebound                tenderness   Rectal:     Deferred   Extremities:   Moves all extremities well, no edema, no cyanosis, no             redness   Pulses:   Pulses palpable and equal bilaterally   Skin:   No bleeding, bruising or rash   Lymph nodes:   No palpable adenopathy   Neurologic:   Cranial nerves 2 - 12 grossly intact, sensation intact, DTR       present and equal bilaterally       Results Review:     Lab Results (last 24 hours)     Procedure Component Value Units Date/Time    Urinalysis, Microscopic Only - Urine, Clean Catch [[id number]]  (Abnormal) Collected: 01/13/21  1228    Specimen: Urine, Clean Catch Updated: 01/13/21 1307     RBC, UA 31-50 /HPF      WBC, UA 13-20 /HPF      Bacteria, UA None Seen /HPF      Squamous Epithelial Cells, UA 0-2 /HPF      Hyaline Casts, UA None Seen /LPF      Mucus, UA Small/1+ /HPF      Methodology Manual Light Microscopy    Urinalysis With Microscopic If Indicated (No Culture) - Urine, Clean Catch [[id number]]  (Abnormal) Collected: 01/13/21 1228    Specimen: Urine, Clean Catch Updated: 01/13/21 1302     Color, UA Dark Yellow     Comment: Result checked         Appearance, UA Clear     pH, UA 5.5     Specific Gravity, UA 1.034     Glucose, UA Negative     Ketones, UA 15 mg/dL (1+)     Bilirubin, UA Small (1+)     Comment: Confirmation testing is unavailable.  A serum bilirubin is recommended for further assessment.        Blood, UA Moderate (2+)     Protein, UA Negative     Leuk Esterase, UA Small (1+)     Nitrite, UA Negative     Urobilinogen, UA 1.0 E.U./dL         No results found for: URINECX     Imaging Results (Last 7 Days)     Procedure Component Value Units Date/Time    XR Hip With or Without Pelvis 2 - 3 View Right [[id number]] Collected: 01/12/21 1912     Updated: 01/12/21 1916    Narrative:      XR HIP W OR WO PELVIS 2-3 VIEW RIGHT-     Date of Exam: 1/12/2021 6:44 PM     Indication: postop; S72.009A-Fracture of unspecified part of neck of  unspecified femur, initial encounter for closed fracture; F10.10-Alcohol  abuse, uncomplicated; Q77-Tbhoune effusion, not elsewhere classified.     Comparison: 01/10/2021     Technique: AP view of the pelvis and 2 views of the hip were obtained.       FINDINGS:  There has been a right hip replacement since the prior exam.  The prosthetic components are unremarkable. There is no acute bony  fracture or dislocation. There is expected soft tissue gas around the  right hip.     Left hip appears intact. There is mild left acetabular spurring. SI  joints and pubic symphysis are preserved. There is a  question of bony  demineralization.         Impression:         1. Unremarkable right hip replacement.     Electronically Signed By-Alea Lopez MD On:1/12/2021 7:13 PM  This report was finalized on [id number] by  Alea Lopez MD.    FL C Arm During Surgery [[id number]] Resulted: 01/12/21 1657     Updated: 01/12/21 1831    XR Hip 1 View Without Pelvis Right (Surgery Only) [[id number]] Resulted: 01/12/21 1655     Updated: 01/12/21 1656    XR Chest 1 View [[id number]] Collected: 01/11/21 1430     Updated: 01/11/21 1433    Narrative:      Examination: XR CHEST 1 VW-     Date of Exam: 1/11/2021 2:10 PM     Indication: SURGERY; S72.009A-Fracture of unspecified part of neck of  unspecified femur, initial encounter for closed fracture; F10.10-Alcohol  abuse, uncomplicated; V95-Hmtfxwn effusion, not elsewhere classified.     Comparison: 01/10/2021     Technique: 1 view of the chest      Findings:  The heart is enlarged. The pulmonary vascular markings are increased  consistent with pulmonary edema. There is a layering left pleural  effusion. There is left basilar airspace disease which could be  atelectasis or pneumonia.       Impression:      Moderate size layering left pleural fluid collection with left basilar  airspace disease favoring atelectasis. There is mild diffuse pulmonary  edema     Electronically Signed By-Phuc Diaz MD On:1/11/2021 2:31 PM  This report was finalized on [id number] by  Phuc Diaz MD.    XR Chest 1 View [[id number]] Collected: 01/10/21 1647     Updated: 01/10/21 1649    Narrative:      XR CHEST 1 VW-     Date of Exam: 1/10/2021 4:23 PM     Indication: pre op; S72.009A-Fracture of unspecified part of neck of  unspecified femur, initial encounter for closed fracture; F10.10-Alcohol  abuse, uncomplicated; G85-Fmxadfu effusion, not elsewhere classified.     Comparison:  None available.     Technique: Single view of the chest was obtained.     FINDINGS: The heart is grossly normal limits for  size. There is a  moderate left-sided pleural effusion. Right lung is clear with chronic  changes.       Impression:         1. There is a moderate left-sided pleural effusion with underlying  chronic changes.        Electronically Signed By-Gee Davison MD On:1/10/2021 4:47 PM  This report was finalized on [id number] by  Gee Davison MD.    XR Hip With or Without Pelvis 2 - 3 View Right [[id number]] Collected: 01/10/21 1646     Updated: 01/10/21 1649    Narrative:      DATE OF EXAM:  1/10/2021 4:22 PM     PROCEDURE:  XR HIP W OR WO PELVIS 2-3 VIEW RIGHT-     INDICATIONS:  trauma; S72.009A-Fracture of unspecified part of neck of unspecified  femur, initial encounter for closed fracture; F10.10-Alcohol abuse,  uncomplicated; Z39-Ailtflp effusion, not elsewhere classified     COMPARISON:  No Comparisons Available     TECHNIQUE:   AP and Lateral views of the right hip and one view of the pelvis were  obtained.     FINDINGS:  There is a fracture involving the subcapital a portion of the right hip  with minimal displacement of fracture fragment. The opposite left hip  reveals degenerative changes showing osteopenia. No fractures of the  bones of the pelvis are identified.        Impression:         1. Minimally displaced subcapital fracture the right hip is noted.     Electronically Signed By-Gee Davison MD On:1/10/2021 4:47 PM  This report was finalized on [id number] by  Gee aDvison MD.    CT Abdomen Pelvis Without Contrast [[id number]] Collected: 01/10/21 1543     Updated: 01/10/21 1552    Narrative:      CT ABDOMEN PELVIS WO CONTRAST-     Date of Exam: 1/10/2021 3:06 PM     Indication: Abdominal trauma.     Comparison: None available.     Technique: Contiguous axial CT images were obtained from the lung bases  to the pubic symphysis without contrast. Sagittal and coronal  reconstructions were performed.  Automated exposure control and  iterative reconstruction methods were used.     FINDINGS:      Evaluation of the lung bases reveal moderate size left-sided pleural  effusion. Calcified lymph nodes are seen in the mediastinum.     Below the diaphragm is a large amount of fluid within the abdomen and  pelvis.     The liver appears cirrhotic with a nodular contour and atrophy.     The spleen is enlarged. Some prominent vascular structures in the  splenic hilum     Pancreas is unrevealing. Gallbladder contains a gallstone.     Kidneys and a probable left renal cyst the adrenal glands are within  normal limits.     The bowel show no obvious inflammatory changes or obstructive pattern.  There is quite a bit of edema in the mesentery.     No gross enlargement of lymph nodes are noted.     There is a fracture noted involving the right hip involving the  subcapital femoral neck region. The opposite left hip reveals  degenerative changes. No fractures of the pelvis are noted. No  compression fracture subluxation is noted.       Impression:         1. Diffuse abdominal ascites is noted thought secondary to cirrhosis  with secondary signs of splenomegaly and portal hypertension with  prominent mesenteric vessels and varices suspected. In addition there is  left-sided pleural effusion  2. Subcapital fracture of the right hip  3. Incidental note is made of gallstones within the gallbladder           Electronically Signed By-Gee Davison MD On:1/10/2021 3:49 PM  This report was finalized on [id number] by  Gee Davison MD.    CT Cervical Spine Without Contrast [[id number]] Collected: 01/10/21 1539     Updated: 01/10/21 1550    Narrative:      CT CERVICAL SPINE WO CONTRAST-     Date of Exam: 1/10/2021 3:06 PM     INDICATION: Neck trauma, impaired ROM (Age 16-64y).     COMPARISON: None available.     TECHNIQUE: Contiguous axial images of the cervical spine was performed  with multiplanar reconstructed images in the sagittal, axial, and  coronal planes.  Automated exposure control and iterative  reconstruction  methods were used.     FINDINGS:  Vertebral alignment is intact. No obvious fracture or subluxation is  noted. There is mild multilevel degenerative disc disease and  degenerative changes. No destructive lesion of bone is noted. The C1-2  relationships are within normal limits. No obvious significant  paravertebral soft tissue abnormalities are identified. Incidental note  is made of a moderate-sized left-sided pleural effusion. No gross rib  fracture is identified on the limited views.       Impression:         1. No obvious fracture or subluxation  2. Multilevel degenerative disc and degenerative changes are identified.  3. Incidental note is made of left pleural effusion. The cause is not  readily apparent on this exam      Electronically Signed By-Gee Davison MD On:1/10/2021 3:43 PM  This report was finalized on [id number] by  Gee Davison MD.    CT Head Without Contrast [[id number]] Collected: 01/10/21 1536     Updated: 01/10/21 1550    Narrative:      CT HEAD WO CONTRAST-     Date of Exam: 1/10/2021 3:06 PM     Indication: Head trauma, mod-severe.     Comparison: None available.     Technique:  Without contrast, contiguous axial CT images of the head  were obtained from skull base to vertex.  Coronal and sagittal  reconstructions were performed.  Automated exposure control and  iterative reconstruction methods were used.     FINDINGS  Calvarium is intact. There is evidence of chronic sinusitis with total  consolidation of the left maxillary sinus. There is chronic ethmoid  sinusitis. Orbits are within normal limits. The calvarium is intact.     There is no evidence of midline shift or mass effect. Cerebral cortical  sulci and ventricles are grossly normal limits. There is no focus to  suggest acute edema hemorrhage or extra-axial fluid collection.       Impression:         1. There is no acute edema hemorrhage or obvious mass effect.  2. Chronic sinusitis is noted involving the left  maxillary and ethmoid  sinuses.  3. Calvarium is grossly intact.        Brain MRI is more sensitive to evaluate for acute or subacute infarcts  and to evaluate for intracranial metastatic disease.     Electronically Signed By-Gee Davison MD On:1/10/2021 3:38 PM  This report was finalized on [id number] by  Gee Davison MD.          Inpatient Meds:   Scheduled Meds:aspirin, 325 mg, Oral, Daily  cefTRIAXone, 1 g, Intravenous, Q24H  sodium chloride, 3 mL, Intravenous, Q12H  tamsulosin, 0.4 mg, Oral, Daily       Continuous Infusions:    PRN Meds:.HYDROcodone-acetaminophen  •  HYDROcodone-acetaminophen  •  HYDROmorphone  •  nitroglycerin  •  ondansetron **OR** ondansetron  •  [COMPLETED] Insert peripheral IV **AND** sodium chloride  •  sodium chloride      Assessment/Plan     Urinary retention    BPH    Immobility secondary to hip fracture and repair    Continue Flomax and we will plan on doing a bedside cystoscopy.  Discussed with patient and daughter all risk benefits and options and they are willing to proceed    I discussed the patients findings and my recommendations with patient.    Bharath White MD  01/14/21  11:33 EST

## 2021-01-14 NOTE — PROGRESS NOTES
"      [facility] Medicine Services Daily Progress Note      Hospitalist Team  LOS 4 days      Patient Care Team:  Provider, No Known as PCP - General    Patient Location: 4129/1      Subjective   Subjective   Pt has been noted to have urinary retention requiring straight cath, denies for any other active complaint.  Chief Complaint / Subjective  Chief Complaint   Patient presents with   • Fall         Brief Synopsis of Hospital Course/HPI    Mr. Mckay is a 65 y.o.  presents to [facility] complaining of right hip pain and on going chronic back pain s/p fall. XR in the ER revealed subcapital right hip fracture.  Patient was admitted and orthopedic surgery was consulted.    Date::          ROS      Objective   Objective      Vital Signs  Temp:  [97.9 °F (36.6 °C)-98.4 °F (36.9 °C)] 97.9 °F (36.6 °C)  Heart Rate:  [75-89] 76  Resp:  [15-18] 18  BP: ()/(52-74) 96/52  Oxygen Therapy  SpO2: 96 %  Pulse Oximetry Type: Continuous  Device (Oxygen Therapy): room air  Flow (L/min): 2  Flowsheet Rows      First Filed Value   Admission Height  182.9 cm (72\") Documented at 01/10/2021 1341   Admission Weight  77.1 kg (170 lb) Documented at 01/10/2021 1341        Intake & Output (last 3 days)       01/11 0701 - 01/12 0700 01/12 0701 - 01/13 0700 01/13 0701 - 01/14 0700 01/14 0701 - 01/15 0700    P.O. 200  360 360    Total Intake(mL/kg) 200 (2.4)  360 (4.4) 360 (4.4)    Urine (mL/kg/hr) 325 (0.2) 825 (0.5) 600 (0.3)     Stool  0      Total Output 325 825 600     Net -125 -825 -240 +360            Urine Unmeasured Occurrence  1 x      Stool Unmeasured Occurrence  1 x          Lines, Drains & Airways    Active LDAs     Name:   Placement date:   Placement time:   Site:   Days:    Peripheral IV 01/10/21 1437 Right Forearm   01/10/21    1437    Forearm   1                  Physical Exam:    Physical Exam  Vitals signs and nursing note reviewed.   Constitutional:       General: He is not in acute " distress.     Appearance: Normal appearance. He is well-developed. He is not ill-appearing, toxic-appearing or diaphoretic.   HENT:      Head: Normocephalic and atraumatic.      Right Ear: Ear canal and external ear normal.      Left Ear: Ear canal and external ear normal.      Nose: Nose normal. No congestion or rhinorrhea.      Mouth/Throat:      Mouth: Mucous membranes are moist.      Pharynx: No oropharyngeal exudate.   Eyes:      General: No scleral icterus.        Right eye: No discharge.         Left eye: No discharge.      Extraocular Movements: Extraocular movements intact.      Conjunctiva/sclera: Conjunctivae normal.      Pupils: Pupils are equal, round, and reactive to light.   Neck:      Musculoskeletal: Normal range of motion and neck supple. No neck rigidity or muscular tenderness.      Thyroid: No thyromegaly.      Vascular: No carotid bruit or JVD.      Trachea: No tracheal deviation.   Cardiovascular:      Rate and Rhythm: Normal rate and regular rhythm.      Pulses: Normal pulses.      Heart sounds: Normal heart sounds. No murmur. No friction rub. No gallop.    Pulmonary:      Effort: Pulmonary effort is normal. No respiratory distress.      Breath sounds: Normal breath sounds. No stridor. No wheezing, rhonchi or rales.   Chest:      Chest wall: No tenderness.   Abdominal:      General: Bowel sounds are normal. There is no distension.      Palpations: Abdomen is soft. There is no mass.      Tenderness: There is no abdominal tenderness. There is no guarding or rebound.      Hernia: No hernia is present.   Musculoskeletal: Normal range of motion.         General: No swelling, tenderness, deformity or signs of injury.      Right lower leg: No edema.      Left lower leg: No edema.   Lymphadenopathy:      Cervical: No cervical adenopathy.   Skin:     General: Skin is warm and dry.      Coloration: Skin is not jaundiced or pale.      Findings: No bruising, erythema or rash.   Neurological:      General:  No focal deficit present.      Mental Status: He is alert and oriented to person, place, and time. Mental status is at baseline.      Cranial Nerves: No cranial nerve deficit.      Sensory: No sensory deficit.      Motor: No weakness or abnormal muscle tone.      Coordination: Coordination normal.   Psychiatric:         Mood and Affect: Mood normal.         Behavior: Behavior normal.         Thought Content: Thought content normal.         Judgment: Judgment normal.              Wounds (last 24 hours)      LDA Wound     Row Name 01/14/21 0705 01/13/21 1940          Wound 01/1955 Right gluteal    Wound - Properties Group Placement Date: 01/10/21  -CS Placement Time: 1954  -CS Present on Hospital Admission: Y  -CS Side: Right  -CS Location: gluteal  -CS Stage, Pressure Injury : unstageable  -CS    Dressing Appearance  dry;intact  -HW  dry;intact  -CP     Closure  REAL  -HW  --     Base  red;blanchable  -HW  red;blanchable  -CP     Drainage Amount  none  -HW  --     Retired Wound - Properties Group Date first assessed: 01/10/21  -CS Time first assessed: 1954  -CS Present on Hospital Admission: Y  -CS Side: Right  -CS Location: gluteal  -CS       Wound 01/12/21 1600 Right hip Incision    Wound - Properties Group Placement Date: 01/12/21  -JY Placement Time: 1600  -JY Present on Hospital Admission: N  -JY Side: Right  -JY Location: hip  -JY Primary Wound Type: Incision  -JY    Dressing Appearance  dry;intact;no drainage  -HW  dry;intact;no drainage  -CP     Closure  REAL  -HW  REAL  -CP     Base  dressing in place, unable to visualize  -HW  dressing in place, unable to visualize  -CP     Drainage Amount  none  -HW  none  -CP     Retired Wound - Properties Group Date first assessed: 01/12/21  -JY Time first assessed: 1600  -JY Present on Hospital Admission: N  -JY Side: Right  -JY Location: hip  -JY Primary Wound Type: Incision  -JY      User Key  (r) = Recorded By, (t) = Taken By, (c) = Cosigned By    Initials Name  Provider Type    Claudette Kelley, RN Registered Nurse    Johny Foster RN Registered Nurse    Nury Sandhu RN Registered Nurse    Roseann Gay RN Registered Nurse          Procedures:    Procedure(s):  TOTAL HIP ARTHROPLASTY ANTERIOR WITH HANA TABLE          Results Review:     I reviewed the patient's new clinical results.      Lab Results (last 24 hours)     Procedure Component Value Units Date/Time    Urinalysis, Microscopic Only - Urine, Clean Catch [[id number]]  (Abnormal) Collected: 01/13/21 1228    Specimen: Urine, Clean Catch Updated: 01/13/21 1307     RBC, UA 31-50 /HPF      WBC, UA 13-20 /HPF      Bacteria, UA None Seen /HPF      Squamous Epithelial Cells, UA 0-2 /HPF      Hyaline Casts, UA None Seen /LPF      Mucus, UA Small/1+ /HPF      Methodology Manual Light Microscopy    Urinalysis With Microscopic If Indicated (No Culture) - Urine, Clean Catch [[id number]]  (Abnormal) Collected: 01/13/21 1228    Specimen: Urine, Clean Catch Updated: 01/13/21 1302     Color, UA Dark Yellow     Comment: Result checked         Appearance, UA Clear     pH, UA 5.5     Specific Gravity, UA 1.034     Glucose, UA Negative     Ketones, UA 15 mg/dL (1+)     Bilirubin, UA Small (1+)     Comment: Confirmation testing is unavailable.  A serum bilirubin is recommended for further assessment.        Blood, UA Moderate (2+)     Protein, UA Negative     Leuk Esterase, UA Small (1+)     Nitrite, UA Negative     Urobilinogen, UA 1.0 E.U./dL        No results found for: HGBA1C  Results from last 7 days   Lab Units 01/11/21  0333   INR  1.54*           No results found for: LIPASE  No results found for: CHOL, CHLPL, TRIG, HDL, LDL, LDLDIRECT    No results found for: INTRAOP, PREDX, FINALDX, COMDX    Microbiology Results (last 10 days)     Procedure Component Value - Date/Time    MRSA Screen, PCR (Inpatient) - Swab, Nares [[id number]]  (Normal) Collected: 01/11/21 1502    Lab Status: Final result Specimen: Swab  from Nares Updated: 01/11/21 1737     MRSA PCR No MRSA Detected    COVID PRE-OP / PRE-PROCEDURE SCREENING ORDER (NO ISOLATION) - Swab, Nasopharynx [[id number]]  (Normal) Collected: 01/10/21 1634    Lab Status: Final result Specimen: Swab from Nasopharynx Updated: 01/10/21 1729    Narrative:      The following orders were created for panel order COVID PRE-OP / PRE-PROCEDURE SCREENING ORDER (NO ISOLATION) - Swab, Nasopharynx.  Procedure                               Abnormality         Status                     ---------                               -----------         ------                     COVID-19,CEPHEID,COR/ELLIE...[[id number]]  Normal              Final result                 Please view results for these tests on the individual orders.    COVID-19,CEPHEID,COR/ELLIE/PAD IN-HOUSE(OR EMERGENT/ADD-ON),NP SWAB IN TRANSPORT MEDIA 3-4 HR TAT - Swab, Nasopharynx [[id number]]  (Normal) Collected: 01/10/21 1634    Lab Status: Final result Specimen: Swab from Nasopharynx Updated: 01/10/21 1729     COVID19 Not Detected    Narrative:      Fact sheet for providers: https://www.fda.gov/media/734071/download     Fact sheet for patients: https://www.fda.gov/media/389528/download          ECG/EMG Results (most recent)     Procedure Component Value Units Date/Time    Adult Transthoracic Echo Complete W/ Cont if Necessary Per Protocol [[id number]] Collected: 01/12/21 0938     Updated: 01/12/21 1330     BSA 2.0 m^2      Ao root diam 3.2 cm      Ao root area 8.0 cm^2      ACS 1.9 cm      Ao root area (BSA corrected) 1.6     MV E max shell 75.3 cm/sec      MV A max shell 114.1 cm/sec      MV E/A 0.66     MV V2 max 113.2 cm/sec      MV max PG 5.1 mmHg      MV V2 mean 71.0 cm/sec      MV mean PG 2.3 mmHg      MV V2 VTI 23.8 cm      Ao pk shell 131.7 cm/sec      Ao max PG 6.9 mmHg      Ao max PG (full) 3.3 mmHg      Ao V2 mean 92.0 cm/sec      Ao mean PG 3.8 mmHg      Ao mean PG (full) 1.6 mmHg      Ao V2 VTI 24.5 cm      LV V1 max PG 3.6 mmHg       LV V1 mean PG 2.2 mmHg      LV V1 max 95.0 cm/sec      LV V1 mean 69.8 cm/sec      LV V1 VTI 18.6 cm      SV(Ao) 196.7 ml      SI(Ao) 96.6 ml/m^2      TR max shell 13.1 cm/sec      RVSP(TR) 3.1 mmHg      RAP systole 3.0 mmHg       CV ECHO YT - BZI_BMI 24.4 kilograms/m^2       CV ECHO TY - BSA(HAYCOCK) 2.0 m^2       CV ECHO TY - BZI_METRIC_WEIGHT 81.6 kg       CV ECHO TY - BZI_METRIC_HEIGHT 182.9 cm     ECG 12 Lead [[id number]] Collected: 01/11/21 1525     Updated: 01/14/21 0844     QT Interval 378 ms     Narrative:      HEART RATE= 94  bpm  RR Interval= 636  ms  MS Interval= 164  ms  P Horizontal Axis= 23  deg  P Front Axis= 13  deg  QRSD Interval= 97  ms  QT Interval= 378  ms  QRS Axis= -13  deg  T Wave Axis= -57  deg  - ABNORMAL ECG -  Sinus rhythm  Probable anterior infarct, age indeterminate VS poor r wave progression  No previous ECG available for comparison  Electronically Signed By: Zack Gruber ([facility]) 14-Jan-2021 08:39:10  Date and Time of Study: 2021-01-11 15:25:50                    Ct Abdomen Pelvis Without Contrast    Result Date: 1/10/2021   1. Diffuse abdominal ascites is noted thought secondary to cirrhosis with secondary signs of splenomegaly and portal hypertension with prominent mesenteric vessels and varices suspected. In addition there is left-sided pleural effusion 2. Subcapital fracture of the right hip 3. Incidental note is made of gallstones within the gallbladder    Electronically Signed By-Gee Davison MD On:1/10/2021 3:49 PM This report was finalized on [id number] by  Gee Davison MD.    Ct Head Without Contrast    Result Date: 1/10/2021   1. There is no acute edema hemorrhage or obvious mass effect. 2. Chronic sinusitis is noted involving the left maxillary and ethmoid sinuses. 3. Calvarium is grossly intact.   Brain MRI is more sensitive to evaluate for acute or subacute infarcts and to evaluate for intracranial metastatic disease.  Electronically Signed  By-Gee Davison MD On:1/10/2021 3:38 PM This report was finalized on [id number] by  Gee Davison MD.    Ct Cervical Spine Without Contrast    Result Date: 1/10/2021   1. No obvious fracture or subluxation 2. Multilevel degenerative disc and degenerative changes are identified. 3. Incidental note is made of left pleural effusion. The cause is not readily apparent on this exam  Electronically Signed By-Gee Davison MD On:1/10/2021 3:43 PM This report was finalized on [id number] by  Gee Davison MD.    Xr Chest 1 View    Result Date: 1/11/2021  Moderate size layering left pleural fluid collection with left basilar airspace disease favoring atelectasis. There is mild diffuse pulmonary edema  Electronically Signed By-Phuc Diaz MD On:1/11/2021 2:31 PM This report was finalized on [id number] by  Phuc Diaz MD.    Xr Chest 1 View    Result Date: 1/10/2021   1. There is a moderate left-sided pleural effusion with underlying chronic changes.   Electronically Signed By-Gee Davison MD On:1/10/2021 4:47 PM This report was finalized on [id number] by  Gee Davison MD.    Xr Hip With Or Without Pelvis 2 - 3 View Right    Result Date: 1/12/2021   1. Unremarkable right hip replacement.  Electronically Signed By-Alea Lopez MD On:1/12/2021 7:13 PM This report was finalized on [id number] by  Alea Lopez MD.    Xr Hip With Or Without Pelvis 2 - 3 View Right    Result Date: 1/10/2021   1. Minimally displaced subcapital fracture the right hip is noted.  Electronically Signed By-Gee Davison MD On:1/10/2021 4:47 PM This report was finalized on [id number] by  Gee Davison MD.          Xrays, labs reviewed personally by physician.    Medication Review:   I have reviewed the patient's current medication list      Scheduled Meds  aspirin, 325 mg, Oral, Daily  cefTRIAXone, 1 g, Intravenous, Q24H  sodium chloride, 3 mL, Intravenous, Q12H  tamsulosin, 0.4 mg, Oral,  Daily        Meds Infusions       Meds PRN  HYDROcodone-acetaminophen  •  HYDROcodone-acetaminophen  •  HYDROmorphone  •  nitroglycerin  •  ondansetron **OR** ondansetron  •  [COMPLETED] Insert peripheral IV **AND** sodium chloride  •  sodium chloride        Assessment/Plan   Assessment/Plan     Active Hospital Problems    Diagnosis  POA   • **Closed fracture of hip (CMS/[facility]) [S72.009A]  Yes      Resolved Hospital Problems   No resolved problems to display.       MEDICAL DECISION MAKING COMPLEXITY BY PROBLEM:     Assessment and plan:     Minimally displaced subcapsular fracture of the right hip  status post right anterior total hip arthroplasty  -Analgesics ordered  -Orthopedic surgery consulted  - Post op care   - Dvt prophylaxis as per ortho service.     EtOH dependence  -follow CIWA protocol prn w/d symptoms     Incidental findings of cirrhosis, portal hypertension, gallstones, splenomegaly and ascites      Hyponatremia, mild, monitor    Urinary retention requiring straight cath, flomax, consult urology service.    Acute UTI ... iv rocephin, follow urine culture.               VTE Prophylaxis -     VTE Prophylaxis -   Mechanical Order History:      Ordered        01/10/21 1935  Place Venous Foot Pump  Once         01/10/21 1935  Maintain Venous Foot Pump  Once                 Pharmalogical Order History:     None            Code Status -   Code Status and Medical Interventions:   Ordered at: 01/10/21 1812     Code Status:    CPR     Medical Interventions (Level of Support Prior to Arrest):    Full       This patient has been examined wearing appropriate Personal Protective Equipment and discussed with hospital infection control department. 01/14/21        Discharge Planning          Electronically signed by Trevor Juárez MD, 01/14/21, 10:40 EST.  [facility] Hospitalist Team

## 2021-01-14 NOTE — PROGRESS NOTES
Continued Stay Note   [place]     Patient Name: Bereket Mckay  MRN: [id number]  Today's Date: 1/14/2021    Admit Date: 1/10/2021    Discharge Plan    Plan to return home with family and [facility] [place] [facility].  Discharge Barrier: monitor urinary distention one more day      Expected Discharge Date and Time     Expected Discharge Date Expected Discharge Time    Diony 15, 2021             Luana Yeung RN

## 2021-01-14 NOTE — PLAN OF CARE
Goal Outcome Evaluation:  Plan of Care Reviewed With: patient  Progress: no change  Outcome Summary: Urology consulted for continued urinary retention. pt had bedside cysto this morning. order for De La Garza cath to be placed and kept at d/c. continue Flomax. minimal pain reported.

## 2021-01-15 VITALS
OXYGEN SATURATION: 95 % | SYSTOLIC BLOOD PRESSURE: 113 MMHG | HEIGHT: 72 IN | HEART RATE: 82 BPM | TEMPERATURE: 98 F | WEIGHT: 184.75 LBS | BODY MASS INDEX: 25.02 KG/M2 | RESPIRATION RATE: 16 BRPM | DIASTOLIC BLOOD PRESSURE: 74 MMHG

## 2021-01-15 PROCEDURE — 99239 HOSP IP/OBS DSCHRG MGMT >30: CPT | Performed by: HOSPITALIST

## 2021-01-15 PROCEDURE — 25010000002 CEFTRIAXONE PER 250 MG: Performed by: HOSPITALIST

## 2021-01-15 PROCEDURE — 97116 GAIT TRAINING THERAPY: CPT

## 2021-01-15 PROCEDURE — 97530 THERAPEUTIC ACTIVITIES: CPT

## 2021-01-15 RX ORDER — ASPIRIN 325 MG
325 TABLET ORAL DAILY
Qty: 12 TABLET | Refills: 0 | Status: SHIPPED | OUTPATIENT
Start: 2021-01-16 | End: 2021-02-25

## 2021-01-15 RX ORDER — CEPHALEXIN 500 MG/1
500 CAPSULE ORAL 2 TIMES DAILY
Qty: 10 CAPSULE | Refills: 0 | Status: SHIPPED | OUTPATIENT
Start: 2021-01-15 | End: 2021-02-25

## 2021-01-15 RX ORDER — TAMSULOSIN HYDROCHLORIDE 0.4 MG/1
0.4 CAPSULE ORAL DAILY
Qty: 30 CAPSULE | Refills: 0 | Status: ON HOLD | OUTPATIENT
Start: 2021-01-15 | End: 2021-04-14 | Stop reason: SDUPTHER

## 2021-01-15 RX ORDER — HYDROCODONE BITARTRATE AND ACETAMINOPHEN 5; 325 MG/1; MG/1
1 TABLET ORAL EVERY 4 HOURS PRN
Qty: 20 TABLET | Refills: 0 | Status: SHIPPED | OUTPATIENT
Start: 2021-01-15 | End: 2021-01-19

## 2021-01-15 RX ADMIN — CEFTRIAXONE SODIUM 1 G: 1 INJECTION, POWDER, FOR SOLUTION INTRAMUSCULAR; INTRAVENOUS at 10:52

## 2021-01-15 RX ADMIN — ASPIRIN 325 MG ORAL TABLET 325 MG: 325 PILL ORAL at 09:05

## 2021-01-15 NOTE — PROGRESS NOTES
Case Management Discharge Note           Selected Continued Care - Discharged on 1/15/2021 Admission date: 1/10/2021 - Discharge disposition: Home-Health Care Svc        Home Medical Care Coordination complete    Service Provider Selected Services Address Phone Fax Patient Preferred    [facility]ARE [facility] [address] [address] [phone] --           Final Discharge Disposition Code: 06 - home with home health care

## 2021-01-15 NOTE — DISCHARGE SUMMARY
[facility] Medicine Services  DISCHARGE SUMMARY        Prepared For PCP:  Provider, No Known    Patient Name: Bereket Mckay  : 1955  MRN: [id number]      Date of Admission:   1/10/2021    Date of Discharge:  1/15/2021    Length of stay:  LOS: 5 days     Hospital Course     Presenting Problem:   Alcohol abuse [F10.10]  Pleural effusion [J90]  Closed fracture of hip, unspecified laterality, initial encounter (CMS/[facility]) [S72.009A]      Active Hospital Problems    Diagnosis  POA   • **Closed fracture of hip (CMS/[facility]) [S72.009A]  Yes      Resolved Hospital Problems   No resolved problems to display.           Hospital Course by problem list.    Minimally displaced subcapsular fracture of the right hip  status post right anterior total hip arthroplasty  -Orthopedic surgery consulted  - PTOT evaluated the patient and pt will be discharge home with home health care to follow with orthopaedic service in a week time.       EtOH dependence  -follow CIWA protocol prn w/d symptoms     Incidental findings of cirrhosis, portal hypertension, gallstones, splenomegaly and ascites      Hyponatremia, mild, monitor     Urinary retention secondary to BPH, requiring cath, flomax on discharge status post bed side cystoscopy, consulted urology service.... will follow outpatient in office for voiding trial.     Acute UTI ... iv rocephin ......... change to oral keflex orally for few more days on discharge.          Recommendation for Outpatient Providers:     Follow up with FP in a week time.  Follow up with Orthopaedic service in a week time.  Follow up with Urology service in a week time.        Reasons For Change In Medications and Indications for New Medications:        Day of Discharge     HPI:       Vital Signs:   Temp:  [97.9 °F (36.6 °C)-98.3 °F (36.8 °C)] 98.3 °F (36.8 °C)  Heart Rate:  [77-79] 77  Resp:  [16-20] 20  BP: (101-109)/(54-67) 107/54     Physical Exam:  Physical Exam  Vitals signs and nursing  note reviewed.   Constitutional:       General: He is not in acute distress.     Appearance: Normal appearance. He is well-developed. He is not ill-appearing, toxic-appearing or diaphoretic.   HENT:      Head: Normocephalic and atraumatic.      Right Ear: Ear canal and external ear normal.      Left Ear: Ear canal and external ear normal.      Nose: Nose normal. No congestion or rhinorrhea.      Mouth/Throat:      Mouth: Mucous membranes are moist.      Pharynx: No oropharyngeal exudate.   Eyes:      General: No scleral icterus.        Right eye: No discharge.         Left eye: No discharge.      Extraocular Movements: Extraocular movements intact.      Conjunctiva/sclera: Conjunctivae normal.      Pupils: Pupils are equal, round, and reactive to light.   Neck:      Musculoskeletal: Normal range of motion and neck supple. No neck rigidity or muscular tenderness.      Thyroid: No thyromegaly.      Vascular: No carotid bruit or JVD.      Trachea: No tracheal deviation.   Cardiovascular:      Rate and Rhythm: Normal rate and regular rhythm.      Pulses: Normal pulses.      Heart sounds: Normal heart sounds. No murmur. No friction rub. No gallop.    Pulmonary:      Effort: Pulmonary effort is normal. No respiratory distress.      Breath sounds: Normal breath sounds. No stridor. No wheezing, rhonchi or rales.   Chest:      Chest wall: No tenderness.   Abdominal:      General: Bowel sounds are normal. There is no distension.      Palpations: Abdomen is soft. There is no mass.      Tenderness: There is no abdominal tenderness. There is no guarding or rebound.      Hernia: No hernia is present.   Musculoskeletal: Normal range of motion.         General: No swelling, tenderness, deformity or signs of injury.      Right lower leg: No edema.      Left lower leg: No edema.   Lymphadenopathy:      Cervical: No cervical adenopathy.   Skin:     General: Skin is warm and dry.      Coloration: Skin is not jaundiced or pale.       Findings: No bruising, erythema or rash.   Neurological:      General: No focal deficit present.      Mental Status: He is alert and oriented to person, place, and time. Mental status is at baseline.      Cranial Nerves: No cranial nerve deficit.      Sensory: No sensory deficit.      Motor: No weakness or abnormal muscle tone.      Coordination: Coordination normal.   Psychiatric:         Mood and Affect: Mood normal.         Behavior: Behavior normal.         Thought Content: Thought content normal.         Judgment: Judgment normal.         Pertinent  and/or Most Recent Results     Results from last 7 days   Lab Units 01/12/21 0328 01/11/21 0334 01/11/21  0333 01/10/21  1438   WBC 10*3/mm3  --  9.60  --  7.90   HEMOGLOBIN g/dL  --  11.8*  --  12.8*   HEMATOCRIT %  --  34.3*  --  37.8   PLATELETS 10*3/mm3  --  172  --  201   SODIUM mmol/L 134*  --  135* 134*   POTASSIUM mmol/L 3.8  --  3.8 3.5   CHLORIDE mmol/L 100  --  102 100   CO2 mmol/L 25.0  --  23.0 22.0   BUN mg/dL 10  --  8 7*   CREATININE mg/dL 0.47*  --  0.39* 0.47*   GLUCOSE mg/dL 95  --  84 110*   CALCIUM mg/dL 8.2*  --  8.3* 8.4*     Results from last 7 days   Lab Units 01/12/21  0328 01/11/21  0333 01/10/21  1438   BILIRUBIN mg/dL 3.0*  --  2.6*   ALK PHOS U/L 100  --  114   ALT (SGPT) U/L 15  --  20   AST (SGOT) U/L 38  --  51*   PROTIME Seconds  --  16.6*  --    INR   --  1.54*  --    APTT seconds  --  38.8*  --            Invalid input(s): TG, LDLCALC, LDLREALC  Results from last 7 days   Lab Units 01/12/21  0328 01/10/21  1438   TROPONIN T ng/mL <0.010 <0.010       Brief Urine Lab Results  (Last result in the past 365 days)      Color   Clarity   Blood   Leuk Est   Nitrite   Protein   CREAT   Urine HCG        01/14/21 1310 Dark Yellow Clear Trace Trace Negative Negative               Microbiology Results Abnormal     Procedure Component Value - Date/Time    MRSA Screen, PCR (Inpatient) - Swab, Nares [[id number]]  (Normal) Collected: 01/11/21  1502    Lab Status: Final result Specimen: Swab from Nares Updated: 01/11/21 1737     MRSA PCR No MRSA Detected    COVID PRE-OP / PRE-PROCEDURE SCREENING ORDER (NO ISOLATION) - Swab, Nasopharynx [[id number]]  (Normal) Collected: 01/10/21 1634    Lab Status: Final result Specimen: Swab from Nasopharynx Updated: 01/10/21 1729    Narrative:      The following orders were created for panel order COVID PRE-OP / PRE-PROCEDURE SCREENING ORDER (NO ISOLATION) - Swab, Nasopharynx.  Procedure                               Abnormality         Status                     ---------                               -----------         ------                     COVID-19,CEPHEID,COR/ELLIE...[[id number]]  Normal              Final result                 Please view results for these tests on the individual orders.    COVID-19,CEPHEID,COR/ELLIE/PAD IN-HOUSE(OR EMERGENT/ADD-ON),NP SWAB IN TRANSPORT MEDIA 3-4 HR TAT - Swab, Nasopharynx [[id number]]  (Normal) Collected: 01/10/21 1634    Lab Status: Final result Specimen: Swab from Nasopharynx Updated: 01/10/21 1729     COVID19 Not Detected    Narrative:      Fact sheet for providers: https://www.fda.gov/media/736339/download     Fact sheet for patients: https://www.fda.gov/media/447563/download          Ct Abdomen Pelvis Without Contrast    Result Date: 1/10/2021  Impression:  1. Diffuse abdominal ascites is noted thought secondary to cirrhosis with secondary signs of splenomegaly and portal hypertension with prominent mesenteric vessels and varices suspected. In addition there is left-sided pleural effusion 2. Subcapital fracture of the right hip 3. Incidental note is made of gallstones within the gallbladder    Electronically Signed By-Gee Davison MD On:1/10/2021 3:49 PM This report was finalized on [id number] by  Gee Davison MD.    Ct Head Without Contrast    Result Date: 1/10/2021  Impression:  1. There is no acute edema hemorrhage or obvious mass effect. 2. Chronic sinusitis is  noted involving the left maxillary and ethmoid sinuses. 3. Calvarium is grossly intact.   Brain MRI is more sensitive to evaluate for acute or subacute infarcts and to evaluate for intracranial metastatic disease.  Electronically Signed By-Gee Davison MD On:1/10/2021 3:38 PM This report was finalized on [id number] by  Gee Davison MD.    Ct Cervical Spine Without Contrast    Result Date: 1/10/2021  Impression:  1. No obvious fracture or subluxation 2. Multilevel degenerative disc and degenerative changes are identified. 3. Incidental note is made of left pleural effusion. The cause is not readily apparent on this exam  Electronically Signed By-Gee Davison MD On:1/10/2021 3:43 PM This report was finalized on [id number] by  Gee Davison MD.    Xr Chest 1 View    Result Date: 1/11/2021  Impression: Moderate size layering left pleural fluid collection with left basilar airspace disease favoring atelectasis. There is mild diffuse pulmonary edema  Electronically Signed By-Phuc Diaz MD On:1/11/2021 2:31 PM This report was finalized on [id number] by  Phuc Diaz MD.    Xr Chest 1 View    Result Date: 1/10/2021  Impression:  1. There is a moderate left-sided pleural effusion with underlying chronic changes.   Electronically Signed By-Gee Davison MD On:1/10/2021 4:47 PM This report was finalized on [id number] by  Gee Davison MD.    Xr Hip With Or Without Pelvis 2 - 3 View Right    Result Date: 1/12/2021  Impression:  1. Unremarkable right hip replacement.  Electronically Signed By-Alea Lopez MD On:1/12/2021 7:13 PM This report was finalized on [id number] by  Alea Lopez MD.    Xr Hip With Or Without Pelvis 2 - 3 View Right    Result Date: 1/10/2021  Impression:  1. Minimally displaced subcapital fracture the right hip is noted.  Electronically Signed By-Gee Davison MD On:1/10/2021 4:47 PM This report was finalized on [id number] by  Gee Davison MD.                 Results for orders placed during the hospital encounter of 01/10/21   Adult Transthoracic Echo Complete W/ Cont if Necessary Per Protocol    Narrative · Left ventricular ejection fraction appears to be 61 - 65%.  · Left ventricular diastolic function is consistent with (grade I)   impaired relaxation.  · The right ventricular cavity is mild to moderately dilated.  · Typically difficult study with limited acoustical windows; limited   M-mode measurements and limited Doppler assessment  · No specific Doppler abnormalities appreciated on limited study; normal   RV systolic pressure                  Test Results Pending at Discharge        Procedures Performed  Procedure(s):  TOTAL HIP ARTHROPLASTY ANTERIOR WITH HANA TABLE         Consults:   Consults     Date and Time Order Name Status Description    1/14/2021 0958 Inpatient Urology Consult Completed     1/11/2021 1814 Inpatient Cardiology Consult      1/10/2021 1935 Inpatient Orthopedic Surgery Consult Completed     1/10/2021 1602 Ortho (on-call MD unless specified) Completed     1/10/2021 1602 Hospitalist (on-call MD unless specified) Completed             Discharge Details        Discharge Medications      New Medications      Instructions Start Date   aspirin 325 MG tablet   325 mg, Oral, Daily   Start Date: January 16, 2021     cephalexin 500 MG capsule  Commonly known as: Keflex   500 mg, Oral, 2 Times Daily      HYDROcodone-acetaminophen 5-325 MG per tablet  Commonly known as: NORCO   1 tablet, Oral, Every 4 Hours PRN      tamsulosin 0.4 MG capsule 24 hr capsule  Commonly known as: FLOMAX   0.4 mg, Oral, Daily             Allergies   Allergen Reactions   • Sulfa Antibiotics Unknown - High Severity         Discharge Disposition:  Home-Health Care Svc    Diet:  Hospital:  Diet Order   Procedures   • Diet Regular         Discharge Activity:         CODE STATUS:    Code Status and Medical Interventions:   Ordered at: 01/10/21 1812     Code Status:    CPR      Medical Interventions (Level of Support Prior to Arrest):    Full         Follow-up Appointments  No future appointments.    Additional Instructions for the Follow-ups that You Need to Schedule     Ambulatory Referral to Home Health   As directed      Face to Face Visit Date: 1/13/2021    Follow-up provider for Plan of Care?: I treated the patient in an acute care facility and will not continue treatment after discharge.    Follow-up provider: CHALO OAKES II [[id number]]    Reason/Clinical Findings: s/p R THR    Describe mobility limitations that make leaving home difficult: s/p R THR    Nursing/Therapeutic Services Requested: Other (eval and treat)    Frequency: 1 Week 1         Discharge Follow-up with PCP   As directed       Currently Documented PCP:    Provider, No Known    PCP Phone Number:    None     Follow Up Details: one week time.         Discharge Follow-up with Specified Provider: Follow up with Orthpaedic service and urology service.; 2 Weeks   As directed      To: Follow up with Orthpaedic service and urology service.    Follow Up: 2 Weeks                 Condition on Discharge:      Stable      This patient has been examined wearing appropriate Personal Protective Equipment and discussed with hospital infection control department. 01/15/21      Electronically signed by Trevor Juárez MD, 01/15/21, 11:16 AM EST.      Time: I spent  33  minutes on this discharge activity which included face-to-face encounter with the patient/reviewing the data in the system/coordination of the care with the nursing staff as well as consultants/documentation/entering orders.

## 2021-01-15 NOTE — THERAPY TREATMENT NOTE
Patient Name: Bereket Mckay  : 1955    MRN: [id number]                              Today's Date: 1/15/2021       Admit Date: 1/10/2021    Visit Dx:     ICD-10-CM ICD-9-CM   1. Closed fracture of hip, unspecified laterality, initial encounter (CMS/HCC)  S72.009A 820.8   2. Alcohol abuse  F10.10 305.00   3. Pleural effusion  J90 511.9   4. Closed fracture of right hip, initial encounter (CMS/HCC)  S72.001A 820.8     Patient Active Problem List   Diagnosis   • Closed fracture of hip (CMS/[facility])     History reviewed. No pertinent past medical history.  Past Surgical History:   Procedure Laterality Date   • TOTAL HIP ARTHROPLASTY Right 2021    Procedure: TOTAL HIP ARTHROPLASTY ANTERIOR WITH HANA TABLE;  Surgeon: Bharath Mercado II, MD;  Location: [facility] MAIN OR;  Service: Orthopedics;  Laterality: Right;     General Information     Row Name 01/15/21 1127          Physical Therapy Time and Intention    Document Type  therapy note (daily note)  -     Row Name 01/15/21 1127          Cognition    Orientation Status (Cognition)  oriented x 3  -     Row Name 01/15/21 1127          Safety Issues, Functional Mobility    Safety Issues Affecting Function (Mobility)  safety precaution awareness  -     Impairments Affecting Function (Mobility)  balance;endurance/activity tolerance;strength  -       User Key  (r) = Recorded By, (t) = Taken By, (c) = Cosigned By    Initials Name Provider Type     Marguerite Krueger PTA Physical Therapy Assistant        Mobility     Row Name 01/15/21 1128          Bed Mobility    Bed Mobility  bed mobility (all) activities  -     All Activities, [place] (Bed Mobility)  supervision  -     Assistive Device (Bed Mobility)  bed rails;head of bed elevated  -     Row Name 01/15/21 1128          Sit-Stand Transfer    Sit-Stand [place] (Transfers)  verbal cues;minimum assist (75% patient effort);1 person to manage equipment  -     Assistive Device (Sit-Stand Transfers)   walker, front-wheeled  -[facility] Name 01/15/21 1128          Gait/Stairs (Locomotion)    [place] Level (Gait)  verbal cues;minimum assist (75% patient effort);1 person assist  -     Distance in Feet (Gait)  20'  -     Deviations/Abnormal Patterns (Gait)  tanisha decreased;stride length decreased;weight shifting decreased  -     Bilateral Gait Deviations  forward flexed posture;heel strike decreased  -     Comment (Gait/Stairs)  Vcs for increased step length, reports LLE stronger of two, uneven step length, ff posture. Limited righting recovery, low activity tolerance, high falls risk.  -       User Key  (r) = Recorded By, (t) = Taken By, (c) = Cosigned By    Initials Name Provider Type     Marguerite Krueger PTA Physical Therapy Assistant        Obj/Interventions     [facility] Name 01/15/21 1131          Balance    Balance Assessment  standing dynamic balance;sitting dynamic balance  -     Static Sitting Balance  WFL  -     Dynamic Sitting Balance  mild impairment  -     Static Standing Balance  mild impairment  -     Dynamic Standing Balance  mild impairment  -     Comment, Balance  Limited away from midline, requiring assist c adls.  -       User Key  (r) = Recorded By, (t) = Taken By, (c) = Cosigned By    Initials Name Provider Type     Marguerite Krueger PTA Physical Therapy Assistant        Goals/Plan    No documentation.       Clinical Impression     [facility] Name 01/15/21 1132          Pain    Additional Documentation  Pain Scale: Numbers Pre/Post-Treatment (Group)  -LH     Row Name 01/15/21 1132          Pain Scale: Numbers Pre/Post-Treatment    Pretreatment Pain Rating  0/10 - no pain  -     Posttreatment Pain Rating  0/10 - no pain  -[facility] Name 01/15/21 1132          Plan of Care Review    Plan of Care Reviewed With  patient;daughter  -     Progress  improving  -     Outcome Summary  Required min A for safe, functional mobility. Amb. 20' c rwx. c min A. Vcs for increased step length,  reports LLE stronger of two, uneven step length, ff posture. Limited righting recovery, low activity tolerance, high falls risk. Presents safe for home c 24/7 caregiver and HH at d/c to address deficits. PPE worn: Mask, gloves, shield.  -     Row Name 01/15/21 1132          Vital Signs    O2 Delivery Pre Treatment  room air  -     O2 Delivery Intra Treatment  room air  -     O2 Delivery Post Treatment  room air  -     Pre Patient Position  Supine  -     Intra Patient Position  Standing  -     Post Patient Position  Sitting  -     Row Name 01/15/21 1132          Positioning and Restraints    Post Treatment Position  bsc  -     On BS commode  notified nsg;sitting;call light within reach;with family/caregiver  -       User Key  (r) = Recorded By, (t) = Taken By, (c) = Cosigned By    Initials Name Provider Type     Marguerite Krueger PTA Physical Therapy Assistant        Outcome Measures    No documentation.       Physical Therapy Education                 Title: PT OT SLP Therapies (Done)     Topic: Physical Therapy (Done)     Point: Mobility training (Done)     Learning Progress Summary           Patient Acceptance, D, DU,NR by  at 1/15/2021 1135    Comment: Reinforced safe hand placement c rwx. use and transfers.    Acceptance, E, VU by  at 1/15/2021 0512    Acceptance, E, VU by CP at 1/14/2021 0355    Acceptance, E, VU by  at 1/13/2021 1255                               User Key     Initials Effective Dates Name Provider Type Discipline     06/19/19 -  Alecia Kincaid, PT Physical Therapist PT     03/01/19 -  Marguerite Krueger PTA Physical Therapy Assistant PT     03/01/19 -  Nury Hendrix RN Registered Nurse Nurse              PT Recommendation and Plan     Plan of Care Reviewed With: patient, daughter  Progress: improving  Outcome Summary: Required min A for safe, functional mobility. Amb. 20' c rwx. c min A. Vcs for increased step length, reports LLE stronger of two, uneven step  length, ff posture. Limited righting recovery, low activity tolerance, high falls risk. Presents safe for home c 24/7 caregiver and HH at d/c to address deficits. PPE worn: Mask, gloves, shield.     Time Calculation:   PT Charges     Row Name 01/15/21 1137             Time Calculation    Start Time  1054  -      Stop Time  1117  -      Time Calculation (min)  23 min  -      PT Received On  01/15/21  -      PT - Next Appointment  01/17/21  -         Time Calculation- PT    Total Timed Code Minutes- PT  23 minute(s)  -         Timed Charges    63088 - Gait Training Minutes   8  -      19761 - PT Therapeutic Activity Minutes  15  -        User Key  (r) = Recorded By, (t) = Taken By, (c) = Cosigned By    Initials Name Provider Type     Marguerite Krueger PTA Physical Therapy Assistant        Therapy Charges for Today     Code Description Service Date Service Provider Modifiers Qty    [id number] HC GAIT TRAINING EA 15 MIN 1/15/2021 Marguerite Krueger PTA GP 1    [id number]  PT THERAPEUTIC ACT EA 15 MIN 1/15/2021 Marguerite Krueger PTA GP 1               Marguerite Krueger PTA  1/15/2021

## 2021-01-15 NOTE — PLAN OF CARE
Goal Outcome Evaluation:  Plan of Care Reviewed With: patient  Progress: improving     Patient resting well with eyes closed. Has been sleeping throughout night. C/o pain with activity but refused offers of pain meds. V/S stable. Will continue to monitor.

## 2021-01-15 NOTE — PLAN OF CARE
Goal Outcome Evaluation:  Plan of Care Reviewed With: patient, daughter  Progress: improving  Outcome Summary: Required min A for safe, functional mobility. Amb. 20' c rwx. c min A. Vcs for increased step length, reports LLE stronger of two, uneven step length, ff posture. Limited righting recovery, low activity tolerance, high falls risk. Presents safe for home c 24/7 caregiver and HH at d/c to address deficits. PPE worn: Mask, gloves, shield.

## 2021-01-16 ENCOUNTER — READMISSION MANAGEMENT (OUTPATIENT)
Dept: CALL CENTER | Facility: HOSPITAL | Age: 66
End: 2021-01-16

## 2021-01-16 NOTE — OUTREACH NOTE
Prep Survey      Responses   [facility] facility patient discharged from?  [facility]   Is LACE score < 7 ?  No   Emergency Room discharge w/ pulse ox?  No   Eligibility  Readm Mgmt   Discharge diagnosis  Closed fracture of hip [s/p TOTAL HIP ARTHROPLASTY ANTERIOR WITH HANA TABLE]   Does the patient have one of the following disease processes/diagnoses(primary or secondary)?  Total Joint Replacement   Does the patient have Home health ordered?  Yes   What is the Home health agency?   [facility]   Is there a DME ordered?  Yes   What DME was ordered?  [facility] for RW and B[facility]   Comments regarding appointments  Per AVS   Medication alerts for this patient  start aspirin   Prep survey completed?  Yes          Marti Green RN

## 2021-01-18 ENCOUNTER — READMISSION MANAGEMENT (OUTPATIENT)
Dept: CALL CENTER | Facility: HOSPITAL | Age: 66
End: 2021-01-18

## 2021-01-18 LAB
BH CV STRESS BP STAGE 1: NORMAL
BH CV STRESS BP STAGE 3: NORMAL
BH CV STRESS BP STAGE 5: NORMAL
BH CV STRESS COMMENTS STAGE 1: NORMAL
BH CV STRESS COMMENTS STAGE 2: NORMAL
BH CV STRESS DOSE REGADENOSON STAGE 1: 0.4
BH CV STRESS DURATION MIN STAGE 1: 0
BH CV STRESS DURATION MIN STAGE 2: 4
BH CV STRESS DURATION SEC STAGE 1: 10
BH CV STRESS DURATION SEC STAGE 2: 0
BH CV STRESS HR STAGE 1: 89
BH CV STRESS HR STAGE 2: 98
BH CV STRESS HR STAGE 3: 94
BH CV STRESS HR STAGE 4: 94
BH CV STRESS HR STAGE 5: 93
BH CV STRESS PROTOCOL 1: NORMAL
BH CV STRESS RECOVERY BP: NORMAL MMHG
BH CV STRESS RECOVERY HR: 91 BPM
BH CV STRESS STAGE 1: 1
BH CV STRESS STAGE 2: 2
BH CV STRESS STAGE 3: 3
BH CV STRESS STAGE 4: 4
BH CV STRESS STAGE 5: 5
LV EF NUC BP: 69 %
MAXIMAL PREDICTED HEART RATE: 154 BPM
PERCENT MAX PREDICTED HR: 63.64 %
STRESS BASELINE BP: NORMAL MMHG
STRESS BASELINE HR: 85 BPM
STRESS PERCENT HR: 75 %
STRESS POST PEAK BP: NORMAL MMHG
STRESS POST PEAK HR: 98 BPM
STRESS TARGET HR: 131 BPM

## 2021-01-18 NOTE — OUTREACH NOTE
Total Joint Week 1 Survey      Responses   [facility] patient discharged from?  [place]   Does the patient have one of the following disease processes/diagnoses(primary or secondary)?  Total Joint Replacement   Joint surgery performed?  Hip   Week 1 attempt successful?  Yes   Call start time  1532   Call end time  1534   Discharge diagnosis  Closed fracture of hip   Is patient permission given to speak with other caregiver?  Yes   List who call center can speak with  [place]- Daughter   Person spoke with today (if not patient) and relationship  [place]- Daughter    Does the patient have all medications related to this admission filled (includes all antibiotics, pain medications, etc.)  Yes   Is the patient taking all medications as directed (includes completed medication regime)?  Yes   Is the patient able to teach back alternate methods of pain control?  Ice, Reposition, Correct alignment, Short, frequent activity   Does the patient have a follow up appointment with their surgeon?  Yes   Has the patient kept scheduled appointments due by today?  N/A   Has home health visited the patient within 72 hours of discharge?  Yes   Has all DME been delivered?  Yes   Psychosocial issues?  No   Has the patient began therapy sessions (either in the home or as an out patient)?  Yes   Does the patient have a wound vac in place?  N/A   Has the patient fallen since discharge?  No   Did the patient receive a copy of their discharge instructions?  Yes   Nursing interventions  Reviewed instructions with patient, Educated on MyChart   What is the patient's perception of their functional status since discharge?  Improving   Is the patient able to teach back signs and symptoms of infection?  Temp >100.4 for 24h or longer, Incisional drainage, Blisters around incision, Increased swelling or redness around incision (not associated with surgical edema), Severe discomfort or pain, Changes in mobility, Shortness of breath or chest pain    Is the patient able to teach back how to prevent infection?  Check incision daily, Wash hands before and after touching incision   Is the patient able to teach back signs and symptoms of DVT?  Redness in calf, Swelling in calf, Area hot to touch, Severe pain in calf, Shortness of breath or chest pain   Is the patient able to teach back home safety measures?  Ability to shower, Accessibility to necessary areas in home   If the patient is a current smoker, are they able to teach back resources for cessation?  Smoking cessation medications   Is the patient/caregiver able to teach back the hierarchy of who to call/visit for symptoms/problems? PCP, Specialist, Home health nurse, Urgent Care, ED, 911  Yes   Week 1 call completed?  Yes          Arlen Velez RN

## 2021-01-25 ENCOUNTER — READMISSION MANAGEMENT (OUTPATIENT)
Dept: CALL CENTER | Facility: HOSPITAL | Age: 66
End: 2021-01-25

## 2021-01-26 NOTE — OUTREACH NOTE
"Total Joint Week 2 Survey      Responses   [facility] patient discharged from?  [place]   Does the patient have one of the following disease processes/diagnoses(primary or secondary)?  Total Joint Replacement   Joint surgery performed?  Hip   Call start time  1921   Call end time  1922   List who call center can speak with  [place]- Daughter   Person spoke with today (if not patient) and relationship  [place]- Daughter    Week 2 call completed?  Yes   Wrap up additional comments  Call was very brief with daughter.  Very short with answer and states \"He is fine\"          Sydney Rushing LPN  "

## 2021-01-30 ENCOUNTER — LAB REQUISITION (OUTPATIENT)
Dept: LAB | Facility: HOSPITAL | Age: 66
End: 2021-01-30

## 2021-01-30 DIAGNOSIS — Z00.00 ENCOUNTER FOR GENERAL ADULT MEDICAL EXAMINATION WITHOUT ABNORMAL FINDINGS: ICD-10-CM

## 2021-01-30 LAB — SARS-COV-2 ORF1AB RESP QL NAA+PROBE: NOT DETECTED

## 2021-01-30 PROCEDURE — U0004 COV-19 TEST NON-CDC HGH THRU: HCPCS | Performed by: PHYSICAL MEDICINE & REHABILITATION

## 2021-02-09 ENCOUNTER — READMISSION MANAGEMENT (OUTPATIENT)
Dept: CALL CENTER | Facility: HOSPITAL | Age: 66
End: 2021-02-09

## 2021-02-09 NOTE — OUTREACH NOTE
Total Joint Month 1 Survey      Responses   [facility] patient discharged from?  [place]   Does the patient have one of the following disease processes/diagnoses(primary or secondary)?  Total Joint Replacement   Joint surgery performed?  Hip   Month 1 attempt successful?  Yes   Call start time  1641   Call end time  1643   Has the patient been back in either the hospital or Emergency Department since discharge?  No   Discharge diagnosis  Closed fracture of hip   Person spoke with today (if not patient) and relationship  [place]- Daughter    Prescription comments  patient's furosemide was increased due to swelling   Is the patient taking all medications as directed (includes completed medication regime)?  Yes   Has the patient kept scheduled appointments due by today?  Yes   Is the patient still receiving Home Health Services?  No   Is the patient still attending therapy sessions(either in the home or as an outpatient)?  Yes   Has the patient fallen since discharge?  No   Comments  patient is doing home exercises   What is the patient's perception of their functional status since discharge?  Improving   Is the patient/caregiver able to teach back the hierarchy of who to call/visit for symptoms/problems? PCP, Specialist, Home health nurse, Urgent Care, ED, 911  Yes   Month 1 call completed?  Yes   Wrap up additional comments  Daughter reports patient is doing ok.  He is having some swelling in the lower extremity and providers are aware.  She denies needs at this time.           Laura Petit RN

## 2021-02-25 ENCOUNTER — APPOINTMENT (OUTPATIENT)
Dept: GENERAL RADIOLOGY | Facility: HOSPITAL | Age: 66
End: 2021-02-25

## 2021-02-25 ENCOUNTER — APPOINTMENT (OUTPATIENT)
Dept: CT IMAGING | Facility: HOSPITAL | Age: 66
End: 2021-02-25

## 2021-02-25 ENCOUNTER — HOSPITAL ENCOUNTER (INPATIENT)
Facility: HOSPITAL | Age: 66
LOS: 5 days | Discharge: HOME HEALTH | End: 2021-03-03
Attending: INTERNAL MEDICINE | Admitting: INTERNAL MEDICINE

## 2021-02-25 ENCOUNTER — APPOINTMENT (OUTPATIENT)
Dept: CARDIOLOGY | Facility: HOSPITAL | Age: 66
End: 2021-02-25

## 2021-02-25 DIAGNOSIS — K70.31 ASCITES DUE TO ALCOHOLIC CIRRHOSIS (HCC): ICD-10-CM

## 2021-02-25 DIAGNOSIS — J90 PLEURAL EFFUSION: ICD-10-CM

## 2021-02-25 DIAGNOSIS — D64.9 ANEMIA, UNSPECIFIED TYPE: ICD-10-CM

## 2021-02-25 DIAGNOSIS — R06.09 DYSPNEA ON EXERTION: Primary | ICD-10-CM

## 2021-02-25 PROBLEM — R18.8 ASCITES: Status: ACTIVE | Noted: 2021-02-25

## 2021-02-25 LAB
ALBUMIN SERPL-MCNC: 2.5 G/DL (ref 3.5–5.2)
ALBUMIN/GLOB SERPL: 0.6 G/DL
ALP SERPL-CCNC: 99 U/L (ref 39–117)
ALT SERPL W P-5'-P-CCNC: 13 U/L (ref 1–41)
ANION GAP SERPL CALCULATED.3IONS-SCNC: 9 MMOL/L (ref 5–15)
ARTERIAL PATENCY WRIST A: POSITIVE
AST SERPL-CCNC: 43 U/L (ref 1–40)
ATMOSPHERIC PRESS: ABNORMAL MM[HG]
B PARAPERT DNA SPEC QL NAA+PROBE: NOT DETECTED
B PERT DNA SPEC QL NAA+PROBE: NOT DETECTED
BACTERIA UR QL AUTO: ABNORMAL /HPF
BASE EXCESS BLDA CALC-SCNC: 1.5 MMOL/L (ref 0–3)
BASOPHILS # BLD AUTO: 0.1 10*3/MM3 (ref 0–0.2)
BASOPHILS NFR BLD AUTO: 1.2 % (ref 0–1.5)
BDY SITE: ABNORMAL
BH CV LOWER VASCULAR LEFT COMMON FEMORAL AUGMENT: NORMAL
BH CV LOWER VASCULAR LEFT COMMON FEMORAL COMPETENT: NORMAL
BH CV LOWER VASCULAR LEFT COMMON FEMORAL COMPRESS: NORMAL
BH CV LOWER VASCULAR LEFT COMMON FEMORAL PHASIC: NORMAL
BH CV LOWER VASCULAR LEFT COMMON FEMORAL SPONT: NORMAL
BH CV LOWER VASCULAR RIGHT COMMON FEMORAL AUGMENT: NORMAL
BH CV LOWER VASCULAR RIGHT COMMON FEMORAL COMPETENT: NORMAL
BH CV LOWER VASCULAR RIGHT COMMON FEMORAL COMPRESS: NORMAL
BH CV LOWER VASCULAR RIGHT COMMON FEMORAL PHASIC: NORMAL
BH CV LOWER VASCULAR RIGHT COMMON FEMORAL SPONT: NORMAL
BH CV LOWER VASCULAR RIGHT DISTAL FEMORAL COMPRESS: NORMAL
BH CV LOWER VASCULAR RIGHT GASTRONEMIUS COMPRESS: NORMAL
BH CV LOWER VASCULAR RIGHT GREATER SAPH AK COMPRESS: NORMAL
BH CV LOWER VASCULAR RIGHT GREATER SAPH BK COMPRESS: NORMAL
BH CV LOWER VASCULAR RIGHT LESSER SAPH COMPRESS: NORMAL
BH CV LOWER VASCULAR RIGHT MID FEMORAL AUGMENT: NORMAL
BH CV LOWER VASCULAR RIGHT MID FEMORAL COMPETENT: NORMAL
BH CV LOWER VASCULAR RIGHT MID FEMORAL COMPRESS: NORMAL
BH CV LOWER VASCULAR RIGHT MID FEMORAL PHASIC: NORMAL
BH CV LOWER VASCULAR RIGHT MID FEMORAL SPONT: NORMAL
BH CV LOWER VASCULAR RIGHT PERONEAL COMPRESS: NORMAL
BH CV LOWER VASCULAR RIGHT POPLITEAL AUGMENT: NORMAL
BH CV LOWER VASCULAR RIGHT POPLITEAL COMPETENT: NORMAL
BH CV LOWER VASCULAR RIGHT POPLITEAL COMPRESS: NORMAL
BH CV LOWER VASCULAR RIGHT POPLITEAL PHASIC: NORMAL
BH CV LOWER VASCULAR RIGHT POPLITEAL SPONT: NORMAL
BH CV LOWER VASCULAR RIGHT POSTERIOR TIBIAL COMPRESS: NORMAL
BH CV LOWER VASCULAR RIGHT PROXIMAL FEMORAL COMPRESS: NORMAL
BH CV LOWER VASCULAR RIGHT SAPHENOFEMORAL JUNCTION COMPRESS: NORMAL
BILIRUB SERPL-MCNC: 1.9 MG/DL (ref 0–1.2)
BILIRUB UR QL STRIP: NEGATIVE
BUN SERPL-MCNC: 10 MG/DL (ref 8–23)
BUN/CREAT SERPL: 15.2 (ref 7–25)
C PNEUM DNA NPH QL NAA+NON-PROBE: NOT DETECTED
CALCIUM SPEC-SCNC: 8.1 MG/DL (ref 8.6–10.5)
CHLORIDE SERPL-SCNC: 102 MMOL/L (ref 98–107)
CLARITY UR: CLEAR
CO2 BLDA-SCNC: 25.8 MMOL/L (ref 22–29)
CO2 SERPL-SCNC: 25 MMOL/L (ref 22–29)
COLOR UR: ABNORMAL
CREAT SERPL-MCNC: 0.66 MG/DL (ref 0.76–1.27)
DEPRECATED RDW RBC AUTO: 51.6 FL (ref 37–54)
DEPRECATED RDW RBC AUTO: 53.8 FL (ref 37–54)
EOSINOPHIL # BLD AUTO: 0.3 10*3/MM3 (ref 0–0.4)
EOSINOPHIL NFR BLD AUTO: 7 % (ref 0.3–6.2)
ERYTHROCYTE [DISTWIDTH] IN BLOOD BY AUTOMATED COUNT: 14.8 % (ref 12.3–15.4)
ERYTHROCYTE [DISTWIDTH] IN BLOOD BY AUTOMATED COUNT: 15.3 % (ref 12.3–15.4)
ETHANOL UR QL: <0.01 %
FLUAV SUBTYP SPEC NAA+PROBE: NOT DETECTED
FLUBV RNA ISLT QL NAA+PROBE: NOT DETECTED
GFR SERPL CREATININE-BSD FRML MDRD: 120 ML/MIN/1.73
GLOBULIN UR ELPH-MCNC: 4.3 GM/DL
GLUCOSE SERPL-MCNC: 92 MG/DL (ref 65–99)
GLUCOSE UR STRIP-MCNC: NEGATIVE MG/DL
HADV DNA SPEC NAA+PROBE: NOT DETECTED
HCO3 BLDA-SCNC: 24.8 MMOL/L (ref 21–28)
HCOV 229E RNA SPEC QL NAA+PROBE: NOT DETECTED
HCOV HKU1 RNA SPEC QL NAA+PROBE: NOT DETECTED
HCOV NL63 RNA SPEC QL NAA+PROBE: NOT DETECTED
HCOV OC43 RNA SPEC QL NAA+PROBE: NOT DETECTED
HCT VFR BLD AUTO: 29.1 % (ref 37.5–51)
HCT VFR BLD AUTO: 29.9 % (ref 37.5–51)
HEMODILUTION: NO
HGB BLD-MCNC: 10.4 G/DL (ref 13–17.7)
HGB BLD-MCNC: 9.8 G/DL (ref 13–17.7)
HGB UR QL STRIP.AUTO: ABNORMAL
HMPV RNA NPH QL NAA+NON-PROBE: NOT DETECTED
HOLD SPECIMEN: NORMAL
HPIV1 RNA SPEC QL NAA+PROBE: NOT DETECTED
HPIV2 RNA SPEC QL NAA+PROBE: NOT DETECTED
HPIV3 RNA NPH QL NAA+PROBE: NOT DETECTED
HPIV4 P GENE NPH QL NAA+PROBE: NOT DETECTED
HYALINE CASTS UR QL AUTO: ABNORMAL /LPF
INHALED O2 CONCENTRATION: 21 %
INR PPP: 1.43 (ref 0.93–1.1)
KETONES UR QL STRIP: NEGATIVE
LEUKOCYTE ESTERASE UR QL STRIP.AUTO: NEGATIVE
LYMPHOCYTES # BLD AUTO: 1 10*3/MM3 (ref 0.7–3.1)
LYMPHOCYTES NFR BLD AUTO: 19.5 % (ref 19.6–45.3)
M PNEUMO IGG SER IA-ACNC: NOT DETECTED
MAGNESIUM SERPL-MCNC: 1.9 MG/DL (ref 1.6–2.4)
MCH RBC QN AUTO: 33.7 PG (ref 26.6–33)
MCH RBC QN AUTO: 34.8 PG (ref 26.6–33)
MCHC RBC AUTO-ENTMCNC: 33.6 G/DL (ref 31.5–35.7)
MCHC RBC AUTO-ENTMCNC: 34.9 G/DL (ref 31.5–35.7)
MCV RBC AUTO: 100.4 FL (ref 79–97)
MCV RBC AUTO: 99.8 FL (ref 79–97)
MODALITY: ABNORMAL
MONOCYTES # BLD AUTO: 0.8 10*3/MM3 (ref 0.1–0.9)
MONOCYTES NFR BLD AUTO: 16.1 % (ref 5–12)
NEUTROPHILS NFR BLD AUTO: 2.7 10*3/MM3 (ref 1.7–7)
NEUTROPHILS NFR BLD AUTO: 56.2 % (ref 42.7–76)
NITRITE UR QL STRIP: NEGATIVE
NRBC BLD AUTO-RTO: 0 /100 WBC (ref 0–0.2)
NT-PROBNP SERPL-MCNC: 87.2 PG/ML (ref 0–900)
PCO2 BLDA: 34.1 MM HG (ref 35–48)
PH BLDA: 7.47 PH UNITS (ref 7.35–7.45)
PH UR STRIP.AUTO: 6 [PH] (ref 5–8)
PLATELET # BLD AUTO: 206 10*3/MM3 (ref 140–450)
PLATELET # BLD AUTO: 248 10*3/MM3 (ref 140–450)
PMV BLD AUTO: 6.9 FL (ref 6–12)
PMV BLD AUTO: 7 FL (ref 6–12)
PO2 BLDA: 82.6 MM HG (ref 83–108)
POTASSIUM SERPL-SCNC: 3.9 MMOL/L (ref 3.5–5.2)
PROT SERPL-MCNC: 6.8 G/DL (ref 6–8.5)
PROT UR QL STRIP: NEGATIVE
PROTHROMBIN TIME: 15.5 SECONDS (ref 9.6–11.7)
RBC # BLD AUTO: 2.9 10*6/MM3 (ref 4.14–5.8)
RBC # BLD AUTO: 2.99 10*6/MM3 (ref 4.14–5.8)
RBC # UR: ABNORMAL /HPF
REF LAB TEST METHOD: ABNORMAL
RHINOVIRUS RNA SPEC NAA+PROBE: NOT DETECTED
RSV RNA NPH QL NAA+NON-PROBE: NOT DETECTED
SAO2 % BLDCOA: 96.9 % (ref 94–98)
SARS-COV-2 RNA NPH QL NAA+NON-PROBE: NOT DETECTED
SODIUM SERPL-SCNC: 136 MMOL/L (ref 136–145)
SP GR UR STRIP: 1.01 (ref 1–1.03)
SQUAMOUS #/AREA URNS HPF: ABNORMAL /HPF
TROPONIN T SERPL-MCNC: <0.01 NG/ML (ref 0–0.03)
UROBILINOGEN UR QL STRIP: ABNORMAL
WBC # BLD AUTO: 4.9 10*3/MM3 (ref 3.4–10.8)
WBC # BLD AUTO: 4.9 10*3/MM3 (ref 3.4–10.8)
WBC UR QL AUTO: ABNORMAL /HPF

## 2021-02-25 PROCEDURE — 85025 COMPLETE CBC W/AUTO DIFF WBC: CPT | Performed by: NURSE PRACTITIONER

## 2021-02-25 PROCEDURE — 83880 ASSAY OF NATRIURETIC PEPTIDE: CPT | Performed by: NURSE PRACTITIONER

## 2021-02-25 PROCEDURE — G0378 HOSPITAL OBSERVATION PER HR: HCPCS

## 2021-02-25 PROCEDURE — 87040 BLOOD CULTURE FOR BACTERIA: CPT | Performed by: NURSE PRACTITIONER

## 2021-02-25 PROCEDURE — 85610 PROTHROMBIN TIME: CPT | Performed by: NURSE PRACTITIONER

## 2021-02-25 PROCEDURE — 99219 PR INITIAL OBSERVATION CARE/DAY 50 MINUTES: CPT | Performed by: INTERNAL MEDICINE

## 2021-02-25 PROCEDURE — 25010000002 FUROSEMIDE PER 20 MG: Performed by: NURSE PRACTITIONER

## 2021-02-25 PROCEDURE — 71045 X-RAY EXAM CHEST 1 VIEW: CPT

## 2021-02-25 PROCEDURE — 80053 COMPREHEN METABOLIC PANEL: CPT | Performed by: NURSE PRACTITIONER

## 2021-02-25 PROCEDURE — 93971 EXTREMITY STUDY: CPT

## 2021-02-25 PROCEDURE — 82803 BLOOD GASES ANY COMBINATION: CPT

## 2021-02-25 PROCEDURE — 81001 URINALYSIS AUTO W/SCOPE: CPT | Performed by: NURSE PRACTITIONER

## 2021-02-25 PROCEDURE — 0 IOPAMIDOL PER 1 ML: Performed by: NURSE PRACTITIONER

## 2021-02-25 PROCEDURE — 94799 UNLISTED PULMONARY SVC/PX: CPT

## 2021-02-25 PROCEDURE — 74177 CT ABD & PELVIS W/CONTRAST: CPT

## 2021-02-25 PROCEDURE — 93005 ELECTROCARDIOGRAM TRACING: CPT | Performed by: INTERNAL MEDICINE

## 2021-02-25 PROCEDURE — 84484 ASSAY OF TROPONIN QUANT: CPT | Performed by: NURSE PRACTITIONER

## 2021-02-25 PROCEDURE — 36600 WITHDRAWAL OF ARTERIAL BLOOD: CPT

## 2021-02-25 PROCEDURE — 85027 COMPLETE CBC AUTOMATED: CPT | Performed by: INTERNAL MEDICINE

## 2021-02-25 PROCEDURE — 82077 ASSAY SPEC XCP UR&BREATH IA: CPT | Performed by: NURSE PRACTITIONER

## 2021-02-25 PROCEDURE — 83735 ASSAY OF MAGNESIUM: CPT | Performed by: NURSE PRACTITIONER

## 2021-02-25 PROCEDURE — 93005 ELECTROCARDIOGRAM TRACING: CPT

## 2021-02-25 PROCEDURE — 99285 EMERGENCY DEPT VISIT HI MDM: CPT

## 2021-02-25 PROCEDURE — 0202U NFCT DS 22 TRGT SARS-COV-2: CPT | Performed by: NURSE PRACTITIONER

## 2021-02-25 RX ORDER — NICOTINE 21 MG/24HR
1 PATCH, TRANSDERMAL 24 HOURS TRANSDERMAL EVERY 24 HOURS
Status: DISCONTINUED | OUTPATIENT
Start: 2021-02-25 | End: 2021-02-28

## 2021-02-25 RX ORDER — HYDROCODONE BITARTRATE AND ACETAMINOPHEN 5; 325 MG/1; MG/1
1 TABLET ORAL EVERY 6 HOURS PRN
COMMUNITY

## 2021-02-25 RX ORDER — ALBUMIN (HUMAN) 12.5 G/50ML
62.5 SOLUTION INTRAVENOUS ONCE
Status: DISCONTINUED | OUTPATIENT
Start: 2021-02-25 | End: 2021-03-03 | Stop reason: HOSPADM

## 2021-02-25 RX ORDER — POTASSIUM CHLORIDE 750 MG/1
10 TABLET, FILM COATED, EXTENDED RELEASE ORAL DAILY
COMMUNITY
End: 2021-03-03 | Stop reason: HOSPADM

## 2021-02-25 RX ORDER — SODIUM CHLORIDE 0.9 % (FLUSH) 0.9 %
10 SYRINGE (ML) INJECTION AS NEEDED
Status: DISCONTINUED | OUTPATIENT
Start: 2021-02-25 | End: 2021-03-03 | Stop reason: HOSPADM

## 2021-02-25 RX ORDER — FUROSEMIDE 40 MG/1
40 TABLET ORAL DAILY
COMMUNITY
End: 2021-03-03 | Stop reason: HOSPADM

## 2021-02-25 RX ORDER — ALBUMIN (HUMAN) 12.5 G/50ML
87.5 SOLUTION INTRAVENOUS ONCE
Status: DISCONTINUED | OUTPATIENT
Start: 2021-02-25 | End: 2021-03-03 | Stop reason: HOSPADM

## 2021-02-25 RX ORDER — ONDANSETRON 2 MG/ML
4 INJECTION INTRAMUSCULAR; INTRAVENOUS EVERY 6 HOURS PRN
Status: DISCONTINUED | OUTPATIENT
Start: 2021-02-25 | End: 2021-03-03 | Stop reason: HOSPADM

## 2021-02-25 RX ORDER — ALBUMIN (HUMAN) 12.5 G/50ML
75 SOLUTION INTRAVENOUS ONCE
Status: DISCONTINUED | OUTPATIENT
Start: 2021-02-25 | End: 2021-03-03 | Stop reason: HOSPADM

## 2021-02-25 RX ORDER — ALBUMIN (HUMAN) 12.5 G/50ML
100 SOLUTION INTRAVENOUS ONCE
Status: DISCONTINUED | OUTPATIENT
Start: 2021-02-25 | End: 2021-03-03 | Stop reason: HOSPADM

## 2021-02-25 RX ORDER — ACETAMINOPHEN 650 MG/1
650 SUPPOSITORY RECTAL EVERY 4 HOURS PRN
Status: DISCONTINUED | OUTPATIENT
Start: 2021-02-25 | End: 2021-02-25

## 2021-02-25 RX ORDER — ONDANSETRON 4 MG/1
4 TABLET, FILM COATED ORAL EVERY 6 HOURS PRN
Status: DISCONTINUED | OUTPATIENT
Start: 2021-02-25 | End: 2021-03-03 | Stop reason: HOSPADM

## 2021-02-25 RX ORDER — IPRATROPIUM BROMIDE AND ALBUTEROL SULFATE 2.5; .5 MG/3ML; MG/3ML
3 SOLUTION RESPIRATORY (INHALATION) EVERY 4 HOURS PRN
Status: DISCONTINUED | OUTPATIENT
Start: 2021-02-25 | End: 2021-03-03 | Stop reason: HOSPADM

## 2021-02-25 RX ORDER — ALBUMIN (HUMAN) 12.5 G/50ML
37.5 SOLUTION INTRAVENOUS ONCE
Status: DISCONTINUED | OUTPATIENT
Start: 2021-02-25 | End: 2021-03-03 | Stop reason: HOSPADM

## 2021-02-25 RX ORDER — ALBUMIN (HUMAN) 12.5 G/50ML
112.5 SOLUTION INTRAVENOUS ONCE
Status: DISCONTINUED | OUTPATIENT
Start: 2021-02-25 | End: 2021-03-03 | Stop reason: HOSPADM

## 2021-02-25 RX ORDER — ACETAMINOPHEN 325 MG/1
325 TABLET ORAL EVERY 4 HOURS PRN
Status: DISCONTINUED | OUTPATIENT
Start: 2021-02-25 | End: 2021-02-25

## 2021-02-25 RX ORDER — ACETAMINOPHEN 160 MG/5ML
325 SOLUTION ORAL EVERY 4 HOURS PRN
Status: DISCONTINUED | OUTPATIENT
Start: 2021-02-25 | End: 2021-02-25

## 2021-02-25 RX ORDER — FUROSEMIDE 10 MG/ML
40 INJECTION INTRAMUSCULAR; INTRAVENOUS ONCE
Status: COMPLETED | OUTPATIENT
Start: 2021-02-25 | End: 2021-02-25

## 2021-02-25 RX ORDER — NITROGLYCERIN 0.4 MG/1
0.4 TABLET SUBLINGUAL
Status: DISCONTINUED | OUTPATIENT
Start: 2021-02-25 | End: 2021-03-03 | Stop reason: HOSPADM

## 2021-02-25 RX ORDER — CHOLECALCIFEROL (VITAMIN D3) 125 MCG
5 CAPSULE ORAL NIGHTLY PRN
Status: DISCONTINUED | OUTPATIENT
Start: 2021-02-25 | End: 2021-03-03 | Stop reason: HOSPADM

## 2021-02-25 RX ORDER — SODIUM CHLORIDE 0.9 % (FLUSH) 0.9 %
10 SYRINGE (ML) INJECTION EVERY 12 HOURS SCHEDULED
Status: DISCONTINUED | OUTPATIENT
Start: 2021-02-25 | End: 2021-03-03 | Stop reason: HOSPADM

## 2021-02-25 RX ORDER — DOCUSATE SODIUM 100 MG/1
100 CAPSULE, LIQUID FILLED ORAL 2 TIMES DAILY
Status: DISCONTINUED | OUTPATIENT
Start: 2021-02-25 | End: 2021-03-03 | Stop reason: HOSPADM

## 2021-02-25 RX ORDER — ALBUMIN (HUMAN) 12.5 G/50ML
50 SOLUTION INTRAVENOUS ONCE
Status: DISCONTINUED | OUTPATIENT
Start: 2021-02-25 | End: 2021-03-03 | Stop reason: HOSPADM

## 2021-02-25 RX ORDER — FAMOTIDINE 20 MG/1
40 TABLET, FILM COATED ORAL DAILY
Status: DISCONTINUED | OUTPATIENT
Start: 2021-02-25 | End: 2021-03-03 | Stop reason: HOSPADM

## 2021-02-25 RX ADMIN — DOCUSATE SODIUM 100 MG: 100 CAPSULE, LIQUID FILLED ORAL at 22:04

## 2021-02-25 RX ADMIN — FUROSEMIDE 40 MG: 10 INJECTION, SOLUTION INTRAMUSCULAR; INTRAVENOUS at 14:09

## 2021-02-25 RX ADMIN — IOPAMIDOL 100 ML: 755 INJECTION, SOLUTION INTRAVENOUS at 12:26

## 2021-02-25 RX ADMIN — FAMOTIDINE 40 MG: 20 TABLET, FILM COATED ORAL at 16:16

## 2021-02-25 RX ADMIN — Medication 10 ML: at 22:06

## 2021-02-25 RX ADMIN — Medication 10 ML: at 16:19

## 2021-02-26 ENCOUNTER — READMISSION MANAGEMENT (OUTPATIENT)
Dept: CALL CENTER | Facility: HOSPITAL | Age: 66
End: 2021-02-26

## 2021-02-26 ENCOUNTER — APPOINTMENT (OUTPATIENT)
Dept: GENERAL RADIOLOGY | Facility: HOSPITAL | Age: 66
End: 2021-02-26

## 2021-02-26 LAB
ALBUMIN FLD-MCNC: <0.4 G/DL
ALBUMIN SERPL-MCNC: 2.2 G/DL (ref 3.5–5.2)
ALBUMIN/GLOB SERPL: 0.7 G/DL
ALP SERPL-CCNC: 90 U/L (ref 39–117)
ALT SERPL W P-5'-P-CCNC: 9 U/L (ref 1–41)
AMMONIA BLD-SCNC: 63 UMOL/L (ref 16–60)
AMYLASE FLD-CCNC: 9 U/L
ANION GAP SERPL CALCULATED.3IONS-SCNC: 7 MMOL/L (ref 5–15)
APPEARANCE FLD: ABNORMAL
AST SERPL-CCNC: 29 U/L (ref 1–40)
BASOPHILS # BLD AUTO: 0 10*3/MM3 (ref 0–0.2)
BASOPHILS NFR BLD AUTO: 1 % (ref 0–1.5)
BILIRUB SERPL-MCNC: 1.4 MG/DL (ref 0–1.2)
BUN SERPL-MCNC: 11 MG/DL (ref 8–23)
BUN/CREAT SERPL: 18.3 (ref 7–25)
CALCIUM SPEC-SCNC: 8 MG/DL (ref 8.6–10.5)
CHLORIDE SERPL-SCNC: 104 MMOL/L (ref 98–107)
CO2 SERPL-SCNC: 26 MMOL/L (ref 22–29)
COLOR FLD: YELLOW
CREAT SERPL-MCNC: 0.6 MG/DL (ref 0.76–1.27)
DEPRECATED RDW RBC AUTO: 52.9 FL (ref 37–54)
EOSINOPHIL # BLD AUTO: 0.3 10*3/MM3 (ref 0–0.4)
EOSINOPHIL NFR BLD AUTO: 6.2 % (ref 0.3–6.2)
ERYTHROCYTE [DISTWIDTH] IN BLOOD BY AUTOMATED COUNT: 15.1 % (ref 12.3–15.4)
GFR SERPL CREATININE-BSD FRML MDRD: 134 ML/MIN/1.73
GLOBULIN UR ELPH-MCNC: 3.3 GM/DL
GLUCOSE FLD-MCNC: 88 MG/DL
GLUCOSE SERPL-MCNC: 85 MG/DL (ref 65–99)
HCT VFR BLD AUTO: 25.3 % (ref 37.5–51)
HCT VFR BLD AUTO: 25.7 % (ref 37.5–51)
HCT VFR BLD AUTO: 29.3 % (ref 37.5–51)
HGB BLD-MCNC: 8.6 G/DL (ref 13–17.7)
HGB BLD-MCNC: 8.6 G/DL (ref 13–17.7)
HGB BLD-MCNC: 9.7 G/DL (ref 13–17.7)
LDH FLD-CCNC: 31 U/L
LYMPHOCYTES # BLD AUTO: 0.7 10*3/MM3 (ref 0.7–3.1)
LYMPHOCYTES NFR BLD AUTO: 16.4 % (ref 19.6–45.3)
LYMPHOCYTES NFR FLD MANUAL: 73 %
MCH RBC QN AUTO: 33.5 PG (ref 26.6–33)
MCHC RBC AUTO-ENTMCNC: 34 G/DL (ref 31.5–35.7)
MCV RBC AUTO: 98.6 FL (ref 79–97)
METHOD: ABNORMAL
MONOCYTES # BLD AUTO: 0.6 10*3/MM3 (ref 0.1–0.9)
MONOCYTES NFR BLD AUTO: 13.1 % (ref 5–12)
MONOCYTES NFR FLD: 1 %
NEUTROPHILS NFR BLD AUTO: 2.7 10*3/MM3 (ref 1.7–7)
NEUTROPHILS NFR BLD AUTO: 63.3 % (ref 42.7–76)
NEUTROPHILS NFR FLD MANUAL: 26 %
NRBC BLD AUTO-RTO: 0.2 /100 WBC (ref 0–0.2)
NUC CELL # FLD: 69 /MM3
PLATELET # BLD AUTO: 174 10*3/MM3 (ref 140–450)
PMV BLD AUTO: 7.1 FL (ref 6–12)
POTASSIUM SERPL-SCNC: 3.7 MMOL/L (ref 3.5–5.2)
PROT FLD-MCNC: <1 G/DL
PROT SERPL-MCNC: 5.5 G/DL (ref 6–8.5)
RBC # BLD AUTO: 2.57 10*6/MM3 (ref 4.14–5.8)
SODIUM SERPL-SCNC: 137 MMOL/L (ref 136–145)
WBC # BLD AUTO: 4.3 10*3/MM3 (ref 3.4–10.8)

## 2021-02-26 PROCEDURE — 80053 COMPREHEN METABOLIC PANEL: CPT | Performed by: INTERNAL MEDICINE

## 2021-02-26 PROCEDURE — 82150 ASSAY OF AMYLASE: CPT | Performed by: INTERNAL MEDICINE

## 2021-02-26 PROCEDURE — 88185 FLOWCYTOMETRY/TC ADD-ON: CPT

## 2021-02-26 PROCEDURE — 25010000002 LORAZEPAM PER 2 MG: Performed by: INTERNAL MEDICINE

## 2021-02-26 PROCEDURE — 49083 ABD PARACENTESIS W/IMAGING: CPT | Performed by: INTERNAL MEDICINE

## 2021-02-26 PROCEDURE — 99232 SBSQ HOSP IP/OBS MODERATE 35: CPT | Performed by: INTERNAL MEDICINE

## 2021-02-26 PROCEDURE — 87070 CULTURE OTHR SPECIMN AEROBIC: CPT | Performed by: INTERNAL MEDICINE

## 2021-02-26 PROCEDURE — 82945 GLUCOSE OTHER FLUID: CPT | Performed by: INTERNAL MEDICINE

## 2021-02-26 PROCEDURE — 71045 X-RAY EXAM CHEST 1 VIEW: CPT

## 2021-02-26 PROCEDURE — 89051 BODY FLUID CELL COUNT: CPT | Performed by: INTERNAL MEDICINE

## 2021-02-26 PROCEDURE — 84157 ASSAY OF PROTEIN OTHER: CPT | Performed by: INTERNAL MEDICINE

## 2021-02-26 PROCEDURE — 88184 FLOWCYTOMETRY/ TC 1 MARKER: CPT

## 2021-02-26 PROCEDURE — 85014 HEMATOCRIT: CPT | Performed by: INTERNAL MEDICINE

## 2021-02-26 PROCEDURE — 87075 CULTR BACTERIA EXCEPT BLOOD: CPT | Performed by: INTERNAL MEDICINE

## 2021-02-26 PROCEDURE — 85018 HEMOGLOBIN: CPT | Performed by: INTERNAL MEDICINE

## 2021-02-26 PROCEDURE — 82140 ASSAY OF AMMONIA: CPT | Performed by: INTERNAL MEDICINE

## 2021-02-26 PROCEDURE — 0W9G3ZZ DRAINAGE OF PERITONEAL CAVITY, PERCUTANEOUS APPROACH: ICD-10-PCS | Performed by: INTERNAL MEDICINE

## 2021-02-26 PROCEDURE — 85025 COMPLETE CBC W/AUTO DIFF WBC: CPT | Performed by: INTERNAL MEDICINE

## 2021-02-26 PROCEDURE — 88108 CYTOPATH CONCENTRATE TECH: CPT | Performed by: INTERNAL MEDICINE

## 2021-02-26 PROCEDURE — 87205 SMEAR GRAM STAIN: CPT | Performed by: INTERNAL MEDICINE

## 2021-02-26 PROCEDURE — 82042 OTHER SOURCE ALBUMIN QUAN EA: CPT | Performed by: INTERNAL MEDICINE

## 2021-02-26 PROCEDURE — 83615 LACTATE (LD) (LDH) ENZYME: CPT | Performed by: INTERNAL MEDICINE

## 2021-02-26 PROCEDURE — 82247 BILIRUBIN TOTAL: CPT | Performed by: INTERNAL MEDICINE

## 2021-02-26 RX ORDER — LORAZEPAM 2 MG/ML
0.25 INJECTION INTRAMUSCULAR ONCE
Status: COMPLETED | OUTPATIENT
Start: 2021-02-26 | End: 2021-02-26

## 2021-02-26 RX ORDER — TAMSULOSIN HYDROCHLORIDE 0.4 MG/1
0.4 CAPSULE ORAL DAILY
Status: DISCONTINUED | OUTPATIENT
Start: 2021-02-26 | End: 2021-03-03 | Stop reason: HOSPADM

## 2021-02-26 RX ORDER — HYDROCODONE BITARTRATE AND ACETAMINOPHEN 5; 325 MG/1; MG/1
1 TABLET ORAL EVERY 6 HOURS PRN
Status: DISCONTINUED | OUTPATIENT
Start: 2021-02-26 | End: 2021-02-27

## 2021-02-26 RX ADMIN — FAMOTIDINE 40 MG: 20 TABLET, FILM COATED ORAL at 08:31

## 2021-02-26 RX ADMIN — LORAZEPAM 0.25 MG: 2 INJECTION INTRAMUSCULAR; INTRAVENOUS at 12:55

## 2021-02-26 RX ADMIN — HYDROCODONE BITARTRATE AND ACETAMINOPHEN 1 TABLET: 5; 325 TABLET ORAL at 20:52

## 2021-02-26 RX ADMIN — Medication 10 ML: at 08:32

## 2021-02-26 RX ADMIN — DOCUSATE SODIUM 100 MG: 100 CAPSULE, LIQUID FILLED ORAL at 20:52

## 2021-02-26 RX ADMIN — Medication 10 ML: at 20:52

## 2021-02-26 RX ADMIN — TAMSULOSIN HYDROCHLORIDE 0.4 MG: 0.4 CAPSULE ORAL at 20:52

## 2021-02-26 RX ADMIN — DOCUSATE SODIUM 100 MG: 100 CAPSULE, LIQUID FILLED ORAL at 08:31

## 2021-02-26 NOTE — OUTREACH NOTE
Total Joint Month 2 Survey      Responses   [facility] facility patient discharged from?  [place]   Does the patient have one of the following disease processes/diagnoses(primary or secondary)?  Total Joint Replacement   Joint surgery performed?  Hip   Month 2 attempt successful?  No   Revoke  Readmitted          Valeria Ramirez RN

## 2021-02-27 ENCOUNTER — APPOINTMENT (OUTPATIENT)
Dept: GENERAL RADIOLOGY | Facility: HOSPITAL | Age: 66
End: 2021-02-27

## 2021-02-27 PROBLEM — K70.31 ASCITES DUE TO ALCOHOLIC CIRRHOSIS (HCC): Chronic | Status: ACTIVE | Noted: 2021-02-25

## 2021-02-27 PROBLEM — D50.9 IRON DEFICIENCY ANEMIA: Chronic | Status: ACTIVE | Noted: 2021-02-27

## 2021-02-27 PROBLEM — E44.0 MODERATE PROTEIN-CALORIE MALNUTRITION (HCC): Chronic | Status: ACTIVE | Noted: 2021-02-27

## 2021-02-27 LAB
ALBUMIN FLD-MCNC: 0.9 G/DL
ALBUMIN SERPL-MCNC: 2.1 G/DL (ref 3.5–5.2)
ALBUMIN/GLOB SERPL: 0.6 G/DL
ALP SERPL-CCNC: 84 U/L (ref 39–117)
ALT SERPL W P-5'-P-CCNC: 9 U/L (ref 1–41)
AMMONIA BLD-SCNC: 73 UMOL/L (ref 16–60)
ANION GAP SERPL CALCULATED.3IONS-SCNC: 6 MMOL/L (ref 5–15)
APPEARANCE FLD: ABNORMAL
AST SERPL-CCNC: 27 U/L (ref 1–40)
BASOPHILS # BLD AUTO: 0 10*3/MM3 (ref 0–0.2)
BASOPHILS NFR BLD AUTO: 1.3 % (ref 0–1.5)
BILIRUB FLD-MCNC: NORMAL MG/DL
BILIRUB SERPL-MCNC: 1.3 MG/DL (ref 0–1.2)
BUN SERPL-MCNC: 12 MG/DL (ref 8–23)
BUN/CREAT SERPL: 19.4 (ref 7–25)
CALCIUM SPEC-SCNC: 8.2 MG/DL (ref 8.6–10.5)
CHLORIDE SERPL-SCNC: 105 MMOL/L (ref 98–107)
CO2 SERPL-SCNC: 26 MMOL/L (ref 22–29)
COLOR FLD: ABNORMAL
CREAT SERPL-MCNC: 0.62 MG/DL (ref 0.76–1.27)
DEPRECATED RDW RBC AUTO: 52.1 FL (ref 37–54)
EOSINOPHIL # BLD AUTO: 0.3 10*3/MM3 (ref 0–0.4)
EOSINOPHIL NFR BLD AUTO: 8.8 % (ref 0.3–6.2)
EOSINOPHIL NFR FLD MANUAL: 4 %
ERYTHROCYTE [DISTWIDTH] IN BLOOD BY AUTOMATED COUNT: 15 % (ref 12.3–15.4)
FERRITIN SERPL-MCNC: 184.5 NG/ML (ref 30–400)
FOLATE SERPL-MCNC: 5.06 NG/ML (ref 4.78–24.2)
GFR SERPL CREATININE-BSD FRML MDRD: 129 ML/MIN/1.73
GLOBULIN UR ELPH-MCNC: 3.5 GM/DL
GLUCOSE FLD-MCNC: 91 MG/DL
GLUCOSE SERPL-MCNC: 75 MG/DL (ref 65–99)
HCT VFR BLD AUTO: 24.7 % (ref 37.5–51)
HGB BLD-MCNC: 8.6 G/DL (ref 13–17.7)
IRON 24H UR-MRATE: 28 MCG/DL (ref 59–158)
IRON SATN MFR SERPL: 11 % (ref 20–50)
LDH FLD-CCNC: 90 U/L
LYMPHOCYTES # BLD AUTO: 0.8 10*3/MM3 (ref 0.7–3.1)
LYMPHOCYTES NFR BLD AUTO: 21 % (ref 19.6–45.3)
LYMPHOCYTES NFR FLD MANUAL: 73 %
MCH RBC QN AUTO: 34.2 PG (ref 26.6–33)
MCHC RBC AUTO-ENTMCNC: 34.7 G/DL (ref 31.5–35.7)
MCV RBC AUTO: 98.5 FL (ref 79–97)
METHOD: ABNORMAL
MONOCYTES # BLD AUTO: 0.6 10*3/MM3 (ref 0.1–0.9)
MONOCYTES NFR BLD AUTO: 15.5 % (ref 5–12)
MONOCYTES NFR FLD: 14 %
NEUTROPHILS NFR BLD AUTO: 2 10*3/MM3 (ref 1.7–7)
NEUTROPHILS NFR BLD AUTO: 53.4 % (ref 42.7–76)
NEUTROPHILS NFR FLD MANUAL: 9 %
NRBC BLD AUTO-RTO: 0.1 /100 WBC (ref 0–0.2)
NUC CELL # FLD: 521 /MM3
PH FLD: 7.5 [PH] (ref 6.5–7.5)
PLATELET # BLD AUTO: 165 10*3/MM3 (ref 140–450)
PMV BLD AUTO: 6.9 FL (ref 6–12)
POTASSIUM SERPL-SCNC: 4.2 MMOL/L (ref 3.5–5.2)
PROT FLD-MCNC: 2.2 G/DL
PROT SERPL-MCNC: 5.6 G/DL (ref 6–8.5)
RBC # BLD AUTO: 2.51 10*6/MM3 (ref 4.14–5.8)
RETICS # AUTO: 0.03 10*6/MM3 (ref 0.02–0.13)
RETICS/RBC NFR AUTO: 1 % (ref 0.7–1.9)
SODIUM SERPL-SCNC: 137 MMOL/L (ref 136–145)
TIBC SERPL-MCNC: 249 MCG/DL (ref 298–536)
TRANSFERRIN SERPL-MCNC: 167 MG/DL (ref 200–360)
VIT B12 BLD-MCNC: 605 PG/ML (ref 211–946)
WBC # BLD AUTO: 3.8 10*3/MM3 (ref 3.4–10.8)

## 2021-02-27 PROCEDURE — 89051 BODY FLUID CELL COUNT: CPT | Performed by: NURSE PRACTITIONER

## 2021-02-27 PROCEDURE — 99233 SBSQ HOSP IP/OBS HIGH 50: CPT | Performed by: INTERNAL MEDICINE

## 2021-02-27 PROCEDURE — 83540 ASSAY OF IRON: CPT | Performed by: INTERNAL MEDICINE

## 2021-02-27 PROCEDURE — 25010000002 IRON SUCROSE PER 1 MG: Performed by: INTERNAL MEDICINE

## 2021-02-27 PROCEDURE — 80053 COMPREHEN METABOLIC PANEL: CPT | Performed by: INTERNAL MEDICINE

## 2021-02-27 PROCEDURE — 82945 GLUCOSE OTHER FLUID: CPT | Performed by: NURSE PRACTITIONER

## 2021-02-27 PROCEDURE — 71045 X-RAY EXAM CHEST 1 VIEW: CPT

## 2021-02-27 PROCEDURE — 87070 CULTURE OTHR SPECIMN AEROBIC: CPT | Performed by: NURSE PRACTITIONER

## 2021-02-27 PROCEDURE — 87147 CULTURE TYPE IMMUNOLOGIC: CPT | Performed by: NURSE PRACTITIONER

## 2021-02-27 PROCEDURE — 82728 ASSAY OF FERRITIN: CPT | Performed by: INTERNAL MEDICINE

## 2021-02-27 PROCEDURE — 88185 FLOWCYTOMETRY/TC ADD-ON: CPT

## 2021-02-27 PROCEDURE — 25010000002 MORPHINE PER 10 MG: Performed by: INTERNAL MEDICINE

## 2021-02-27 PROCEDURE — 85045 AUTOMATED RETICULOCYTE COUNT: CPT | Performed by: INTERNAL MEDICINE

## 2021-02-27 PROCEDURE — 82607 VITAMIN B-12: CPT | Performed by: INTERNAL MEDICINE

## 2021-02-27 PROCEDURE — 84466 ASSAY OF TRANSFERRIN: CPT | Performed by: INTERNAL MEDICINE

## 2021-02-27 PROCEDURE — 87116 MYCOBACTERIA CULTURE: CPT | Performed by: NURSE PRACTITIONER

## 2021-02-27 PROCEDURE — 82746 ASSAY OF FOLIC ACID SERUM: CPT | Performed by: INTERNAL MEDICINE

## 2021-02-27 PROCEDURE — 84157 ASSAY OF PROTEIN OTHER: CPT | Performed by: NURSE PRACTITIONER

## 2021-02-27 PROCEDURE — 82140 ASSAY OF AMMONIA: CPT | Performed by: INTERNAL MEDICINE

## 2021-02-27 PROCEDURE — 87205 SMEAR GRAM STAIN: CPT | Performed by: NURSE PRACTITIONER

## 2021-02-27 PROCEDURE — 88184 FLOWCYTOMETRY/ TC 1 MARKER: CPT

## 2021-02-27 PROCEDURE — 83986 ASSAY PH BODY FLUID NOS: CPT | Performed by: NURSE PRACTITIONER

## 2021-02-27 PROCEDURE — 87075 CULTR BACTERIA EXCEPT BLOOD: CPT | Performed by: NURSE PRACTITIONER

## 2021-02-27 PROCEDURE — 82042 OTHER SOURCE ALBUMIN QUAN EA: CPT | Performed by: NURSE PRACTITIONER

## 2021-02-27 PROCEDURE — 97162 PT EVAL MOD COMPLEX 30 MIN: CPT

## 2021-02-27 PROCEDURE — 85025 COMPLETE CBC W/AUTO DIFF WBC: CPT | Performed by: INTERNAL MEDICINE

## 2021-02-27 PROCEDURE — 83615 LACTATE (LD) (LDH) ENZYME: CPT | Performed by: NURSE PRACTITIONER

## 2021-02-27 PROCEDURE — 87206 SMEAR FLUORESCENT/ACID STAI: CPT | Performed by: NURSE PRACTITIONER

## 2021-02-27 PROCEDURE — 0W9B30Z DRAINAGE OF LEFT PLEURAL CAVITY WITH DRAINAGE DEVICE, PERCUTANEOUS APPROACH: ICD-10-PCS | Performed by: INTERNAL MEDICINE

## 2021-02-27 RX ORDER — TORSEMIDE 10 MG/1
20 TABLET ORAL DAILY
Status: DISCONTINUED | OUTPATIENT
Start: 2021-02-27 | End: 2021-03-03 | Stop reason: HOSPADM

## 2021-02-27 RX ORDER — LACTULOSE 10 G/15ML
20 SOLUTION ORAL 2 TIMES DAILY
Status: DISCONTINUED | OUTPATIENT
Start: 2021-02-27 | End: 2021-03-03 | Stop reason: HOSPADM

## 2021-02-27 RX ORDER — SPIRONOLACTONE 25 MG/1
50 TABLET ORAL DAILY
Status: DISCONTINUED | OUTPATIENT
Start: 2021-02-27 | End: 2021-03-01

## 2021-02-27 RX ORDER — TRAMADOL HYDROCHLORIDE 50 MG/1
50 TABLET ORAL EVERY 4 HOURS PRN
Status: DISCONTINUED | OUTPATIENT
Start: 2021-02-27 | End: 2021-02-28

## 2021-02-27 RX ORDER — FOLIC ACID 1 MG/1
1 TABLET ORAL DAILY
Status: DISCONTINUED | OUTPATIENT
Start: 2021-02-27 | End: 2021-03-03 | Stop reason: HOSPADM

## 2021-02-27 RX ORDER — HYDROCODONE BITARTRATE AND ACETAMINOPHEN 5; 325 MG/1; MG/1
1 TABLET ORAL ONCE AS NEEDED
Status: COMPLETED | OUTPATIENT
Start: 2021-02-27 | End: 2021-02-27

## 2021-02-27 RX ORDER — MORPHINE SULFATE 4 MG/ML
2 INJECTION, SOLUTION INTRAMUSCULAR; INTRAVENOUS ONCE
Status: COMPLETED | OUTPATIENT
Start: 2021-02-27 | End: 2021-02-27

## 2021-02-27 RX ADMIN — TORSEMIDE 20 MG: 10 TABLET ORAL at 13:37

## 2021-02-27 RX ADMIN — Medication 10 ML: at 21:17

## 2021-02-27 RX ADMIN — Medication 500 MG: at 13:35

## 2021-02-27 RX ADMIN — FAMOTIDINE 40 MG: 20 TABLET, FILM COATED ORAL at 09:17

## 2021-02-27 RX ADMIN — LACTULOSE 20 G: 20 SOLUTION ORAL at 21:15

## 2021-02-27 RX ADMIN — DOCUSATE SODIUM 100 MG: 100 CAPSULE, LIQUID FILLED ORAL at 09:17

## 2021-02-27 RX ADMIN — TAMSULOSIN HYDROCHLORIDE 0.4 MG: 0.4 CAPSULE ORAL at 09:17

## 2021-02-27 RX ADMIN — HYDROCODONE BITARTRATE AND ACETAMINOPHEN 1 TABLET: 5; 325 TABLET ORAL at 18:30

## 2021-02-27 RX ADMIN — SODIUM CHLORIDE 400 MG: 9 INJECTION, SOLUTION INTRAVENOUS at 13:35

## 2021-02-27 RX ADMIN — LACTULOSE 20 G: 20 SOLUTION ORAL at 13:38

## 2021-02-27 RX ADMIN — Medication 10 ML: at 09:18

## 2021-02-27 RX ADMIN — MORPHINE SULFATE 2 MG: 4 INJECTION INTRAVENOUS at 12:24

## 2021-02-27 RX ADMIN — SPIRONOLACTONE 50 MG: 25 TABLET ORAL at 13:37

## 2021-02-27 RX ADMIN — FOLIC ACID 1 MG: 1 TABLET ORAL at 13:38

## 2021-02-28 ENCOUNTER — APPOINTMENT (OUTPATIENT)
Dept: GENERAL RADIOLOGY | Facility: HOSPITAL | Age: 66
End: 2021-02-28

## 2021-02-28 PROBLEM — J95.811 POSTPROCEDURAL PNEUMOTHORAX: Status: ACTIVE | Noted: 2021-02-28

## 2021-02-28 LAB
ALBUMIN SERPL-MCNC: 2.2 G/DL (ref 3.5–5.2)
ALBUMIN/GLOB SERPL: 0.6 G/DL
ALP SERPL-CCNC: 95 U/L (ref 39–117)
ALT SERPL W P-5'-P-CCNC: 11 U/L (ref 1–41)
ANION GAP SERPL CALCULATED.3IONS-SCNC: 10 MMOL/L (ref 5–15)
AST SERPL-CCNC: 32 U/L (ref 1–40)
BASOPHILS # BLD AUTO: 0 10*3/MM3 (ref 0–0.2)
BASOPHILS NFR BLD AUTO: 0.7 % (ref 0–1.5)
BILIRUB SERPL-MCNC: 1.1 MG/DL (ref 0–1.2)
BUN SERPL-MCNC: 12 MG/DL (ref 8–23)
BUN/CREAT SERPL: 17.4 (ref 7–25)
CALCIUM SPEC-SCNC: 8.3 MG/DL (ref 8.6–10.5)
CHLORIDE SERPL-SCNC: 103 MMOL/L (ref 98–107)
CO2 SERPL-SCNC: 25 MMOL/L (ref 22–29)
CREAT SERPL-MCNC: 0.69 MG/DL (ref 0.76–1.27)
DEPRECATED RDW RBC AUTO: 52.5 FL (ref 37–54)
EOSINOPHIL # BLD AUTO: 0.4 10*3/MM3 (ref 0–0.4)
EOSINOPHIL NFR BLD AUTO: 6.8 % (ref 0.3–6.2)
ERYTHROCYTE [DISTWIDTH] IN BLOOD BY AUTOMATED COUNT: 15 % (ref 12.3–15.4)
GFR SERPL CREATININE-BSD FRML MDRD: 114 ML/MIN/1.73
GLOBULIN UR ELPH-MCNC: 3.8 GM/DL
GLUCOSE SERPL-MCNC: 90 MG/DL (ref 65–99)
HCT VFR BLD AUTO: 26.5 % (ref 37.5–51)
HEMOCCULT STL QL IA: NEGATIVE
HGB BLD-MCNC: 9.2 G/DL (ref 13–17.7)
LYMPHOCYTES # BLD AUTO: 0.8 10*3/MM3 (ref 0.7–3.1)
LYMPHOCYTES NFR BLD AUTO: 15 % (ref 19.6–45.3)
MCH RBC QN AUTO: 34 PG (ref 26.6–33)
MCHC RBC AUTO-ENTMCNC: 34.5 G/DL (ref 31.5–35.7)
MCV RBC AUTO: 98.5 FL (ref 79–97)
MONOCYTES # BLD AUTO: 0.7 10*3/MM3 (ref 0.1–0.9)
MONOCYTES NFR BLD AUTO: 12.5 % (ref 5–12)
NEUTROPHILS NFR BLD AUTO: 3.6 10*3/MM3 (ref 1.7–7)
NEUTROPHILS NFR BLD AUTO: 65 % (ref 42.7–76)
NRBC BLD AUTO-RTO: 0 /100 WBC (ref 0–0.2)
PLATELET # BLD AUTO: 174 10*3/MM3 (ref 140–450)
PMV BLD AUTO: 7 FL (ref 6–12)
POTASSIUM SERPL-SCNC: 3.6 MMOL/L (ref 3.5–5.2)
PROT SERPL-MCNC: 6 G/DL (ref 6–8.5)
QT INTERVAL: 419 MS
RBC # BLD AUTO: 2.69 10*6/MM3 (ref 4.14–5.8)
SODIUM SERPL-SCNC: 138 MMOL/L (ref 136–145)
WBC # BLD AUTO: 5.5 10*3/MM3 (ref 3.4–10.8)

## 2021-02-28 PROCEDURE — 99232 SBSQ HOSP IP/OBS MODERATE 35: CPT | Performed by: INTERNAL MEDICINE

## 2021-02-28 PROCEDURE — 82274 ASSAY TEST FOR BLOOD FECAL: CPT | Performed by: INTERNAL MEDICINE

## 2021-02-28 PROCEDURE — 85025 COMPLETE CBC W/AUTO DIFF WBC: CPT | Performed by: INTERNAL MEDICINE

## 2021-02-28 PROCEDURE — 80053 COMPREHEN METABOLIC PANEL: CPT | Performed by: INTERNAL MEDICINE

## 2021-02-28 PROCEDURE — 71045 X-RAY EXAM CHEST 1 VIEW: CPT

## 2021-02-28 RX ORDER — HYDROCODONE BITARTRATE AND ACETAMINOPHEN 5; 325 MG/1; MG/1
1 TABLET ORAL EVERY 6 HOURS PRN
Status: DISCONTINUED | OUTPATIENT
Start: 2021-02-28 | End: 2021-03-03 | Stop reason: HOSPADM

## 2021-02-28 RX ADMIN — DOCUSATE SODIUM 100 MG: 100 CAPSULE, LIQUID FILLED ORAL at 20:32

## 2021-02-28 RX ADMIN — HYDROCODONE BITARTRATE AND ACETAMINOPHEN 1 TABLET: 5; 325 TABLET ORAL at 18:03

## 2021-02-28 RX ADMIN — Medication 10 ML: at 08:56

## 2021-02-28 RX ADMIN — DOCUSATE SODIUM 100 MG: 100 CAPSULE, LIQUID FILLED ORAL at 08:55

## 2021-02-28 RX ADMIN — Medication 500 MG: at 08:55

## 2021-02-28 RX ADMIN — FOLIC ACID 1 MG: 1 TABLET ORAL at 08:55

## 2021-02-28 RX ADMIN — TAMSULOSIN HYDROCHLORIDE 0.4 MG: 0.4 CAPSULE ORAL at 08:55

## 2021-02-28 RX ADMIN — TRAMADOL HYDROCHLORIDE 50 MG: 50 TABLET, FILM COATED ORAL at 09:56

## 2021-02-28 RX ADMIN — FAMOTIDINE 40 MG: 20 TABLET, FILM COATED ORAL at 08:55

## 2021-02-28 RX ADMIN — Medication 10 ML: at 20:32

## 2021-02-28 RX ADMIN — LACTULOSE 20 G: 20 SOLUTION ORAL at 08:55

## 2021-03-01 ENCOUNTER — APPOINTMENT (OUTPATIENT)
Dept: GENERAL RADIOLOGY | Facility: HOSPITAL | Age: 66
End: 2021-03-01

## 2021-03-01 LAB
ALBUMIN SERPL-MCNC: 2 G/DL (ref 3.5–5.2)
ALBUMIN/GLOB SERPL: 0.6 G/DL
ALP SERPL-CCNC: 88 U/L (ref 39–117)
ALT SERPL W P-5'-P-CCNC: 11 U/L (ref 1–41)
ANION GAP SERPL CALCULATED.3IONS-SCNC: 7 MMOL/L (ref 5–15)
AST SERPL-CCNC: 30 U/L (ref 1–40)
BASOPHILS # BLD AUTO: 0 10*3/MM3 (ref 0–0.2)
BASOPHILS NFR BLD AUTO: 0.4 % (ref 0–1.5)
BILIRUB SERPL-MCNC: 1 MG/DL (ref 0–1.2)
BUN SERPL-MCNC: 15 MG/DL (ref 8–23)
BUN/CREAT SERPL: 27.3 (ref 7–25)
CALCIUM SPEC-SCNC: 8 MG/DL (ref 8.6–10.5)
CHLORIDE SERPL-SCNC: 103 MMOL/L (ref 98–107)
CO2 SERPL-SCNC: 25 MMOL/L (ref 22–29)
CREAT SERPL-MCNC: 0.55 MG/DL (ref 0.76–1.27)
DEPRECATED RDW RBC AUTO: 52.1 FL (ref 37–54)
EOSINOPHIL # BLD AUTO: 0.5 10*3/MM3 (ref 0–0.4)
EOSINOPHIL NFR BLD AUTO: 8.6 % (ref 0.3–6.2)
ERYTHROCYTE [DISTWIDTH] IN BLOOD BY AUTOMATED COUNT: 14.9 % (ref 12.3–15.4)
GFR SERPL CREATININE-BSD FRML MDRD: 149 ML/MIN/1.73
GLOBULIN UR ELPH-MCNC: 3.5 GM/DL
GLUCOSE SERPL-MCNC: 77 MG/DL (ref 65–99)
HCT VFR BLD AUTO: 26 % (ref 37.5–51)
HGB BLD-MCNC: 8.9 G/DL (ref 13–17.7)
LYMPHOCYTES # BLD AUTO: 0.7 10*3/MM3 (ref 0.7–3.1)
LYMPHOCYTES NFR BLD AUTO: 12.6 % (ref 19.6–45.3)
MCH RBC QN AUTO: 33.7 PG (ref 26.6–33)
MCHC RBC AUTO-ENTMCNC: 34.2 G/DL (ref 31.5–35.7)
MCV RBC AUTO: 98.6 FL (ref 79–97)
MONOCYTES # BLD AUTO: 0.6 10*3/MM3 (ref 0.1–0.9)
MONOCYTES NFR BLD AUTO: 10.8 % (ref 5–12)
NEUTROPHILS NFR BLD AUTO: 3.8 10*3/MM3 (ref 1.7–7)
NEUTROPHILS NFR BLD AUTO: 67.6 % (ref 42.7–76)
NRBC BLD AUTO-RTO: 0 /100 WBC (ref 0–0.2)
PLATELET # BLD AUTO: 176 10*3/MM3 (ref 140–450)
PMV BLD AUTO: 7.1 FL (ref 6–12)
POTASSIUM SERPL-SCNC: 4 MMOL/L (ref 3.5–5.2)
PROT SERPL-MCNC: 5.5 G/DL (ref 6–8.5)
RBC # BLD AUTO: 2.64 10*6/MM3 (ref 4.14–5.8)
REF LAB TEST METHOD: NORMAL
SODIUM SERPL-SCNC: 135 MMOL/L (ref 136–145)
WBC # BLD AUTO: 5.6 10*3/MM3 (ref 3.4–10.8)

## 2021-03-01 PROCEDURE — 99233 SBSQ HOSP IP/OBS HIGH 50: CPT | Performed by: INTERNAL MEDICINE

## 2021-03-01 PROCEDURE — 80053 COMPREHEN METABOLIC PANEL: CPT | Performed by: INTERNAL MEDICINE

## 2021-03-01 PROCEDURE — 97116 GAIT TRAINING THERAPY: CPT

## 2021-03-01 PROCEDURE — 71045 X-RAY EXAM CHEST 1 VIEW: CPT

## 2021-03-01 PROCEDURE — 25010000002 CEFTRIAXONE PER 250 MG: Performed by: INTERNAL MEDICINE

## 2021-03-01 PROCEDURE — 85025 COMPLETE CBC W/AUTO DIFF WBC: CPT | Performed by: INTERNAL MEDICINE

## 2021-03-01 RX ORDER — SPIRONOLACTONE 25 MG/1
75 TABLET ORAL DAILY
Status: DISCONTINUED | OUTPATIENT
Start: 2021-03-02 | End: 2021-03-03 | Stop reason: HOSPADM

## 2021-03-01 RX ADMIN — DOCUSATE SODIUM 100 MG: 100 CAPSULE, LIQUID FILLED ORAL at 20:40

## 2021-03-01 RX ADMIN — Medication 10 ML: at 20:41

## 2021-03-01 RX ADMIN — Medication 500 MG: at 08:48

## 2021-03-01 RX ADMIN — SPIRONOLACTONE 50 MG: 25 TABLET ORAL at 08:49

## 2021-03-01 RX ADMIN — FAMOTIDINE 40 MG: 20 TABLET, FILM COATED ORAL at 08:48

## 2021-03-01 RX ADMIN — TAMSULOSIN HYDROCHLORIDE 0.4 MG: 0.4 CAPSULE ORAL at 08:48

## 2021-03-01 RX ADMIN — CEFTRIAXONE SODIUM 2 G: 2 INJECTION, POWDER, FOR SOLUTION INTRAMUSCULAR; INTRAVENOUS at 16:34

## 2021-03-01 RX ADMIN — TORSEMIDE 20 MG: 10 TABLET ORAL at 08:51

## 2021-03-01 RX ADMIN — DOCUSATE SODIUM 100 MG: 100 CAPSULE, LIQUID FILLED ORAL at 08:48

## 2021-03-01 RX ADMIN — HYDROCODONE BITARTRATE AND ACETAMINOPHEN 1 TABLET: 5; 325 TABLET ORAL at 20:47

## 2021-03-01 RX ADMIN — HYDROCODONE BITARTRATE AND ACETAMINOPHEN 1 TABLET: 5; 325 TABLET ORAL at 04:40

## 2021-03-01 RX ADMIN — FOLIC ACID 1 MG: 1 TABLET ORAL at 08:49

## 2021-03-01 RX ADMIN — Medication 10 ML: at 08:49

## 2021-03-01 NOTE — THERAPY TREATMENT NOTE
Subjective: Pt agreeable to therapeutic plan of care.      Objective: Per RN, pt's CT off suction.  Pt presents supine in bed.  Family present.     Bed mobility - Modified-Independent  Transfers - SBA  Ambulation - 200 feet SBA and CGA using RW    Pain: 4 VAS  Education: Provided education on importance of mobility and skilled verbal / tactile cueing throughout intervention.     Assessment: Bereket Mckay presents with functional mobility impairments which indicate the need for skilled intervention. Tolerating session today without incident. Due to pt's CT not being on suction, pt able to ambulate longer distance this date using RW.  Pt progressing toward functional mobility goals.  Will continue to follow and progress as tolerated.     Plan/Recommendations:   Pt would benefit from Home with family assist and and Home Health at discharge from facility and requires no DME at discharge.   Pt desires Home with family assist and and Home Health at discharge. Pt cooperative; agreeable to therapeutic recommendations and plan of care.     Basic Mobility 6-click:  Rollin = Total, A lot = 2, A little = 3; 4 = None  Supine>Sit:   1 = Total, A lot = 2, A little = 3; 4 = None   Sit>Stand with arms:  1 = Total, A lot = 2, A little = 3; 4 = None  Bed>Chair:   1 = Total, A lot = 2, A little = 3; 4 = None  Ambulate in room:  1 = Total, A lot = 2, A little = 3; 4 = None  3-5 Steps with railin = Total, A lot = 2, A little = 3; 4 = None  Score: 19    Modified [place]: 4 = Moderately severe disability (Unable to attend to own bodily needs without assistance, and unable to walk unassisted)     Post-Tx Position: Supine with HOB Elevated and Call light and personal items within reach, family present  PPE: gloves, surgical mask, eyewear protection

## 2021-03-01 NOTE — PLAN OF CARE
Goal Outcome Evaluation:  Plan of Care Reviewed With: patient  Progress: no change  Outcome Summary: Pt is resting in bed comfortably with daughter at bedside. Pt shows no s/s of distress and vitals are stable. Pt had thoracentesis and is hooked up to wall suction. Call light within reach. Will continue to monitor.

## 2021-03-01 NOTE — DISCHARGE PLACEMENT REQUEST
"Bereket Varner (66 y.o. Male)     Date of Birth Social Security Number Address Home Phone MRN    1955  [id number] [address] [phone] [id number]    Christianity Marital Status          None Single       Admission Date Admission Type Admitting Provider Attending Provider Department, Room/Bed    2/25/21 Emergency Tata Post MD Gill, Ravi, MD [facility] 2B MEDICAL INPATIENT, 223/1    Discharge Date Discharge Disposition Discharge Destination                       Attending Provider: León Mckenzie MD    Allergies: Sulfa Antibiotics    Isolation: None   Infection: None   Code Status: CPR    Ht: 182.9 cm (72\")   Wt: 81.4 kg (179 lb 7.3 oz)    Admission Cmt: None   Principal Problem: Ascites due to alcoholic cirrhosis (CMS/HCC) [K70.31]                 Active Insurance as of 2/25/2021     Primary Coverage     Payor Plan Insurance Group Employer/Plan Group    MEDICARE MEDICARE A & B      Payor Plan Address Payor Plan Phone Number Payor Plan Fax Number Effective Dates    [address] [phone]  1/1/2019 - None Entered    [place] [address]       Subscriber Name Subscriber Birth Date Member ID       BEREKET VARNER 1955 [id number]                 Emergency Contacts      (Rel.) Home Phone Work Phone Mobile Phone    LIO NEUMANN (Daughter) [phone] -- [phone]              "

## 2021-03-01 NOTE — PROGRESS NOTES
Continued Stay Note   [place]     Patient Name: Bereket Mckay  MRN: [id number]  Today's Date: 3/1/2021    Admit Date: 2/25/2021    Discharge Plan     Row Name 03/01/21 1437       Plan    Plan Comments  AURELIA met with pt/daughter at bedside (wearing appropriate PPE, 6 FT, 30 MIN). AURELIA provided pt/daughter in law with  Financial contact information. Pt/daughter agreeable to [facility] referral for review of medicaid and available services. SW made referral in [facility] website. SW reviewed advanced directives. Pt has no advanced directives on file. Pt made his daughter in law his healthcare surrogate and defers all medical decisions to her should he become incapable of making any decisions. AURELIA gave pt/daughter in law orginal copy +1 copy. Copy also placed on patient hard chart. AURELIA also provided pt/daughter in law with [place] POA paperwork. they will review and complete outpatient.    Row Name 03/01/21 1434            Discharge Codes    No documentation.       Expected Discharge Date and Time     Expected Discharge Date Expected Discharge Time    Mar 3, 2021           MARION Durán    Phone # [phone]  Cell #[phone]  Fax#[phone]  [email or web address]    MARION Durán

## 2021-03-01 NOTE — PROGRESS NOTES
Continued Stay Note   [place]     Patient Name: Bereket Mckay  MRN: [id number]  Today's Date: 3/1/2021    Admit Date: 2/25/2021    Discharge Plan     Row Name 03/01/21 1434       Plan    Plan  Anticipate routine home with daughter and [facility], ordered and pending acceptance.    Provided Post Acute Provider List?  Yes    Post Acute Provider List  Home Health    Delivered To  Patient;Support Person    Support Person  daughter    Method of Delivery  In person    Patient/Family in Agreement with Plan  yes    Plan Comments  Met with patient and daughter at bedside. Daughter confirms she will stay with patient on discharge. Recently discharged with [facility] [place]  and wants to resume their services. Informed [facility] liaison and order placed. PCP is [facility], Aditi Coreas. Updated care teams. DC barriers: chest tube, pulmonary following        Met with patient in room wearing PPE: mask, goggles.     Maintained distance greater than six feet and spent less than 15 minutes in the room.          Leeanna Mcfarlane RN

## 2021-03-01 NOTE — PROGRESS NOTES
[facility] Medicine Services  INPATIENT PROGRESS NOTE  223/1   Hospitalist Team  LOS 3 days      Patient Care Team:  Aditi Mcfarland APRN as PCP - General (Nurse Practitioner)      Patient was examined with relevant and adequate PPE keeping in mind the current coronavirus pandemic. Minimum of 10 minutes to don and doff PPE.    Chief Complaint / Subjective  Chief Complaint   Patient presents with   • Shortness of Breath     SOA,ascities, swelling to legs, for 2 day pt also fatigued       HPI (4 hpi elements or status of 3 chronic)  Mr. Mckay is a 66 y.o.  with history of liver cirrhosis due to alcohol abuse, abdominal ascites who presents today with his daughter at the bedside with complaints of shortness of breath.  Daughter at the bedside provides most of the information.  She states the patient was admitted to the hospital 1 month ago after a fall that resulted in hip fracture.  She states at that time he had his fracture repaired but was also incidentally found to have findings of liver cirrhosis and abdominal ascites.  Patient states he has had increase in shortness of breath over the last 2 days worse with exertion.  He also reports an increase in his lower leg swelling right greater than left and also an increase in his abdominal swelling.  He denies leg pain.  He states he did have some pain in his abdomen a couple days ago but denies any pain currently.  He denies any nausea vomiting diarrhea, hematemesis black or bloody stool.  He denies any chest pain.  He denies any fever chills.  He does report a mild nonproductive cough.  He denies any urinary complaint.  Patient states he has had no alcohol since his last admission in January 2021.  States he has not yet followed up with gastroenterology.     Has not seen gastroenterology.  Not actively drinking.    No complaints. 02/28/21, 12:09 PM EST    Refuses rehab.  Still has a chest tube      Shortness of Breath          History taken  from: patient chart    Review of Systems   Respiratory: Positive for shortness of breath.      Constitution: Negative.   HENT: Negative.    Eyes: Negative.    Cardiovascular: Negative.    Respiratory:        Shortness of breath   Endocrine: Negative.    Skin: Negative.    Musculoskeletal: Negative.    Gastrointestinal: Negative.    Genitourinary: Negative.    Neurological: Negative.    Psychiatric/Behavioral: Negative.    Allergic/Immunologic: Negative.    All other systems reviewed and are negative.  Noted        Family History   Family history unknown: Yes       Past Medical History:   Diagnosis Date   • Ascites 2021   • Dyspnea 2021       Social History     Socioeconomic History   • Marital status: Single     Spouse name: Not on file   • Number of children: Not on file   • Years of education: Not on file   • Highest education level: Not on file   Tobacco Use   • Smoking status: Former Smoker     Quit date:      Years since quittin.1   • Smokeless tobacco: Current User     Types: Chew   Substance and Sexual Activity   • Alcohol use: Not Currently     Alcohol/week: 5.0 standard drinks     Types: 5 Cans of beer per week     Comment: drinks 5 beers every     • Drug use: Never   • Sexual activity: Defer   Social History Narrative    Lives by himself       Prior to Admission medications    Medication Sig Start Date End Date Taking? Authorizing Provider   furosemide (LASIX) 40 MG tablet Take 40 mg by mouth Daily.   Yes ProviderSammi MD   HYDROcodone-acetaminophen (NORCO) 5-325 MG per tablet Take 1 tablet by mouth Every 6 (Six) Hours As Needed.   Yes ProviderSammi MD   potassium chloride 10 MEQ CR tablet Take 10 mEq by mouth Daily.   Yes ProviderSammi MD   tamsulosin (FLOMAX) 0.4 MG capsule 24 hr capsule Take 1 capsule by mouth Daily. 1/15/21  Yes Trevor Juárez MD        Objective    Physical Exam     Vital Signs  Temp:  [97.3 °F (36.3 °C)-97.8 °F (36.6 °C)] 97.3  "°F (36.3 °C)  Heart Rate:  [75-77] 75  Resp:  [15-17] 15  BP: ()/(61-77) 98/61    Oxygen Therapy  SpO2: 98 %  Pulse Oximetry Type: Intermittent  Device (Oxygen Therapy): room air  Flowsheet Rows      First Filed Value   Admission Height  182.9 cm (72\") Documented at 02/25/2021 1023   Admission Weight  82.2 kg (181 lb 3.5 oz) Documented at 02/25/2021 1025        Weight change:    Intake & Output (last 3 days)       02/26 0701 - 02/27 0700 02/27 0701 - 02/28 0700 02/28 0701 - 03/01 0700 03/01 0701 - 03/02 0700    P.O. 480 480 240 720    I.V. (mL/kg)  0 (0) 0 (0)     Total Intake(mL/kg) 480 (6.2) 480 (5.9) 240 (2.9) 720 (8.8)    Urine (mL/kg/hr) 350 (0.2) 840 (0.4)  300 (0.5)    Drains  2080 450     Total Output 350 2920 450 300    Net +130 -2440 -210 +420                Lines, Drains & Airways    Active LDAs     Name:   Placement date:   Placement time:   Site:   Days:    Peripheral IV 02/25/21 1058 Right Forearm   02/25/21    1058    Forearm   1                Physical Exam:    Physical Exam  Vitals signs and nursing note reviewed.   Constitutional:       General: He is not in acute distress.     Appearance: Normal appearance. He is well-developed. He is not ill-appearing, toxic-appearing or diaphoretic.   HENT:      Head: Normocephalic and atraumatic.      Right Ear: Ear canal and external ear normal.      Left Ear: Ear canal and external ear normal.      Nose: Nose normal. No congestion or rhinorrhea.      Mouth/Throat:      Mouth: Mucous membranes are moist.      Pharynx: No oropharyngeal exudate.   Eyes:      General: No scleral icterus.        Right eye: No discharge.         Left eye: No discharge.      Extraocular Movements: Extraocular movements intact.      Conjunctiva/sclera: Conjunctivae normal.      Pupils: Pupils are equal, round, and reactive to light.   Neck:      Musculoskeletal: Normal range of motion and neck supple. No neck rigidity or muscular tenderness.      Thyroid: No thyromegaly.      " Vascular: No carotid bruit or JVD.      Trachea: No tracheal deviation.   Cardiovascular:      Rate and Rhythm: Normal rate and regular rhythm.      Heart sounds: Normal heart sounds. No murmur. No friction rub. No gallop.       Comments: Pedal pulses absent  Pulmonary:      Effort: Pulmonary effort is normal. No respiratory distress.      Breath sounds: No stridor. No wheezing, rhonchi or rales.      Comments: Absent breath sounds lower half left hemithorax posterior fields.    Left chest tube present now  Chest:      Chest wall: No tenderness.   Abdominal:      General: Bowel sounds are normal. There is distension.      Palpations: Abdomen is soft. There is no mass.      Tenderness: There is no abdominal tenderness. There is no guarding or rebound.      Hernia: No hernia is present.      Comments: Ascites moderate present   Musculoskeletal: Normal range of motion.         General: No swelling, tenderness, deformity or signs of injury.      Right lower leg: Edema present.      Left lower leg: Edema present.   Lymphadenopathy:      Cervical: No cervical adenopathy.   Skin:     General: Skin is warm and dry.      Coloration: Skin is not jaundiced or pale.      Findings: No bruising, erythema or rash.   Neurological:      General: No focal deficit present.      Mental Status: He is alert and oriented to person, place, and time. Mental status is at baseline.      Cranial Nerves: No cranial nerve deficit.      Sensory: No sensory deficit.      Motor: No weakness or abnormal muscle tone.      Coordination: Coordination normal.      Comments: Asterixis absent   Psychiatric:         Mood and Affect: Mood normal.         Behavior: Behavior normal.         Thought Content: Thought content normal.         Judgment: Judgment normal.     Reviewed, no change in above data from the prior day.          PT Recommendation and Plan             Procedures:    * No surgery found *     Assessment/Plan with Problem wise       Active  Hospital Problems    Diagnosis  POA   • **Ascites due to alcoholic cirrhosis (CMS/HCC) [K70.31]  Yes     Priority: High   • Postprocedural pneumothorax [J95.811]  No   • Iron deficiency anemia [D50.9]  Yes   • Moderate protein-calorie malnutrition (CMS/HCC) [E44.0]  Yes   • Pleural effusion on left [J90]  Yes      Resolved Hospital Problems   No resolved problems to display.        Estimated Creatinine Clearance: 103.2 mL/min (A) (by C-G formula based on SCr of 0.55 mg/dL (L)).    Code Status and Medical Interventions:   Ordered at: 02/25/21 1404     Level Of Support Discussed With:    Patient     Code Status:    CPR     Medical Interventions (Level of Support Prior to Arrest):    Full       MEDICAL DECISION MAKING COMPLEXITY BY PROBLEM:       Cirrhosis:  Follow labs  Ultrasound liver if appropriate  Avoid undue sedation  Avoid narcotics  Low-salt diet  GI input    Anemia:  Check iron profile  Check B12 folate  Check ESR  Check for any blood loss  Supplement as needed  Transfusion if appropriate  Consult if appropriate  Venofer for iron deficiency    Left-sided moderate pleural effusion.  Counseled patient and daughter.  After thoracentesis the pleural effusion will rapidly come back if primary etiology not controlled..  Noted the patient is not on diuretics.  Started dose.    Care coordination with nursing for current care and discussed with patient and daughter at bedside.  Patient new to me and extensive chart review needed 02/27/21 10:33 AM EST.    Discussed with patient and daughter at bedside.  Most likely will need rehab placement.    Empyema per Cultures  Add ceftriaxone    Discussed with patient and daughter.  Discharge after chest tube removed    Plan for disposition:  anticipate pt will need 30 days or less of rehab            Continued Care and Services - Admitted Since 2/25/2021    Coordination has not been started for this encounter.     Selected Continued Care - Prior Encounters Includes selections  from prior encounters from 11/27/2020 to 2/27/2021    Discharged on 1/15/2021 Admission date: 1/10/2021 - Discharge disposition: Home-Health Care c    Home Medical Care     Service Provider Selected Services Address Phone Fax Patient Preferred    [facility] [address] [place] [address] [phone] --                     Historical & Objective Data     Results Review:    I reviewed the patient's new lab and radiology results. 03/01/21     Results from last 7 days   Lab Units 03/01/21  0324 02/28/21  0425 02/27/21  0615   WBC 10*3/mm3 5.60 5.50 3.80   HEMOGLOBIN g/dL 8.9* 9.2* 8.6*   HEMATOCRIT % 26.0* 26.5* 24.7*   MCV fL 98.6* 98.5* 98.5*   MCH pg 33.7* 34.0* 34.2*   MPV fL 7.1 7.0 6.9   RDW % 14.9 15.0 15.0   PLATELETS 10*3/mm3 176 174 165     Results from last 7 days   Lab Units 03/01/21  0324 02/28/21  1110 02/27/21  0615  02/25/21  1054   SODIUM mmol/L 135* 138 137   < > 136   POTASSIUM mmol/L 4.0 3.6 4.2   < > 3.9   CHLORIDE mmol/L 103 103 105   < > 102   CO2 mmol/L 25.0 25.0 26.0   < > 25.0   BUN mg/dL 15 12 12   < > 10   CREATININE mg/dL 0.55* 0.69* 0.62*   < > 0.66*   CALCIUM mg/dL 8.0* 8.3* 8.2*   < > 8.1*   MAGNESIUM mg/dL  --   --   --   --  1.9   BILIRUBIN mg/dL 1.0 1.1 1.3*   < > 1.9*   ALK PHOS U/L 88 95 84   < > 99   ALT (SGPT) U/L 11 11 9   < > 13   AST (SGOT) U/L 30 32 27   < > 43*   GLUCOSE mg/dL 77 90 75   < > 92    < > = values in this interval not displayed.     Inflammatory Biomarkers  Results from last 7 days   Lab Units 02/27/21  0615   FERRITIN ng/mL 184.50     No results found for: PHOS  No results found for: HGBA1C  No results found for: CHOL, CHLPL, TRIG, HDL, LDL, LDLDIRECT  No results found for: LIPASE  Results from last 7 days   Lab Units 02/25/21  1054   INR  1.43*       Results from last 7 days   Lab Units 02/25/21  1431   PH, ARTERIAL pH units 7.469*   PO2 ART mm Hg 82.6*   PCO2, ARTERIAL mm Hg 34.1*   HCO3 ART  mmol/L 24.8     Pathology  No results found for: INTRAOP, PREDX, FINALDX, COMDX  COVID19   Date Value Ref Range Status   02/25/2021 Not Detected Not Detected - Ref. Range Final        Microbiology Results (last 10 days)     Procedure Component Value - Date/Time    Anaerobic Culture - Pleural Fluid, Pleural Cavity [[id number]] Collected: 02/27/21 1227    Lab Status: Preliminary result Specimen: Pleural Fluid from Pleural Cavity Updated: 02/28/21 0658     Anaerobic Culture No anaerobes isolated at 24 hours    Body Fluid Culture - Body Fluid, Pleural Cavity [[id number]]  (Abnormal) Collected: 02/27/21 1227    Lab Status: Preliminary result Specimen: Body Fluid from Pleural Cavity Updated: 03/01/21 1138     Body Fluid Culture Gram Positive Cocci     Comment: In broth only.          Gram Stain Many (4+) WBCs per low power field      No organisms seen    AFB Culture - Body Fluid, Pleural Cavity [[id number]] Collected: 02/27/21 1227    Lab Status: Preliminary result Specimen: Body Fluid from Pleural Cavity Updated: 02/28/21 1110     AFB Stain No acid fast bacilli seen    Body Fluid Culture - Body Fluid, Peritoneum [[id number]] Collected: 02/26/21 1339    Lab Status: Preliminary result Specimen: Body Fluid from Peritoneum Updated: 03/01/21 0732     Body Fluid Culture No growth at 3 days     Gram Stain Rare (1+) WBCs seen      No organisms seen    Anaerobic Culture - Body Fluid, Peritoneum [[id number]] Collected: 02/26/21 1339    Lab Status: Preliminary result Specimen: Body Fluid from Peritoneum Updated: 03/01/21 0731     Anaerobic Culture No anaerobes isolated at 3 days    Blood Culture - Blood, Arm, Left [[id number]] Collected: 02/25/21 1108    Lab Status: Preliminary result Specimen: Blood from Arm, Left Updated: 03/01/21 1115     Blood Culture No growth at 4 days    Respiratory Panel PCR w/COVID-19(SARS-CoV-2) JUAN/FATUMA/ELLIE/PAD/COR/MAD/CHAVO In-House, NP Swab in UTM/VTM, 3-4 HR TAT - Swab, Nasopharynx [[id number]]  (Normal)  Collected: 02/25/21 1056    Lab Status: Final result Specimen: Swab from Nasopharynx Updated: 02/25/21 1201     ADENOVIRUS, PCR Not Detected     Coronavirus 229E Not Detected     Coronavirus HKU1 Not Detected     Coronavirus NL63 Not Detected     Coronavirus OC43 Not Detected     COVID19 Not Detected     Human Metapneumovirus Not Detected     Human Rhinovirus/Enterovirus Not Detected     Influenza A PCR Not Detected     Influenza B PCR Not Detected     Parainfluenza Virus 1 Not Detected     Parainfluenza Virus 2 Not Detected     Parainfluenza Virus 3 Not Detected     Parainfluenza Virus 4 Not Detected     RSV, PCR Not Detected     Bordetella pertussis pcr Not Detected     Bordetella parapertussis PCR Not Detected     Chlamydophila pneumoniae PCR Not Detected     Mycoplasma pneumo by PCR Not Detected    Narrative:      Fact sheet for providers: https://docs.[employer]/wp-content/uploads/MNR5412-8674-FT5.1-EUA-Provider-Fact-Sheet-3.pdf    Fact sheet for patients: https://docs.[employer]/wp-content/uploads/YVF4341-2981-AF7.1-EUA-Patient-Fact-Sheet-1.pdf    Test performed by PCR.    Blood Culture - Blood, Arm, Right [[id number]] Collected: 02/25/21 1054    Lab Status: Preliminary result Specimen: Blood from Arm, Right Updated: 03/01/21 1115     Blood Culture No growth at 4 days          ECG/EMG Results (most recent)     Procedure Component Value Units Date/Time    ECG 12 Lead [[id number]] Collected: 02/25/21 1046     Updated: 02/28/21 1320     QT Interval 419 ms     Narrative:      HEART RATE= 70  bpm  RR Interval= 856  ms  FL Interval= 188  ms  P Horizontal Axis= 41  deg  P Front Axis= 12  deg  QRSD Interval= 100  ms  QT Interval= 419  ms  QRS Axis= -4  deg  T Wave Axis= -1  deg  - OTHERWISE NORMAL ECG -  Sinus rhythm  Low voltage, precordial leads  When compared with ECG of 11-Jan-2021 15:25:50,  Significant rate decrease  Electronically Signed By: Phuc Giordano (ELLIE) 28-Feb-2021 13:19:45  Date and Time of Study:  2021-02-25 10:46:58          Results for orders placed during the hospital encounter of 02/25/21   Duplex Venous Lower Extremity - Right CAR    Narrative · Normal right lower extremity venous duplex scan.          Results for orders placed during the hospital encounter of 01/10/21   Adult Transthoracic Echo Complete W/ Cont if Necessary Per Protocol    Narrative · Left ventricular ejection fraction appears to be 61 - 65%.  · Left ventricular diastolic function is consistent with (grade I)   impaired relaxation.  · The right ventricular cavity is mild to moderately dilated.  · Typically difficult study with limited acoustical windows; limited   M-mode measurements and limited Doppler assessment  · No specific Doppler abnormalities appreciated on limited study; normal   RV systolic pressure          Ct Abdomen Pelvis With Contrast    Result Date: 2/25/2021   1. Small lobulated cirrhotic appearing liver 2. Large amount of ascites throughout the abdomen pelvis. Unchanged 3. Cholelithiasis. Small 5 mm size calcified stone in the gallbladder lumen unchanged.. 4. Moderate-large pleural effusion posterior aspect left lower lobe along with moderate-large atelectatic consolidating infiltrate left lower lobe along unchanged since previous study  Electronically Signed By-Ezequiel Marley MD On:2/25/2021 12:38 PM This report was finalized on [id number] by  Ezequiel Marley MD.    Xr Chest 1 View    Result Date: 3/1/2021  1. Stable small left apical pneumothorax measuring 8 mm. 2. Focal kink in the tubing of the left basilar small bore chest tube. Correlate clinically for output or findings of dysfunction. 3. Left basilar airspace opacity, similar to the prior examination likely representing atelectasis.  Electronically Signed By-Destin Montenegro MD On:3/1/2021 7:47 AM This report was finalized on [id number] by  Destin Montenegro MD.    Xr Chest 1 View    Result Date: 2/28/2021    1.  No change in left thoracostomy  tube and small left apical pneumothorax. 2.  No change in left basilar airspace disease.  Electronically Signed By-Ruben Urbina MD On:2/28/2021 8:37 AM This report was finalized on [id number] by  Ruben Urbina MD.    Xr Chest 1 View    Result Date: 2/27/2021  Interval placement of a left-sided chest tube with decrease in the amount of left pleural fluid. There is now a small left apical pneumothorax. The small bore thoracostomy tube does appear to be kinked.  Electronically Signed By-Phuc Diaz MD On:2/27/2021 1:14 PM This report was finalized on [id number] by  Phuc Diaz MD.    Xr Chest 1 View    Result Date: 2/27/2021  Slight decrease in the size of the left pleural effusion in the interval.  Electronically Signed By-Phuc Diaz MD On:2/27/2021 8:48 AM This report was finalized on [id number] by  Phuc Diaz MD.    Xr Chest 1 View    Result Date: 2/26/2021  Worse appearance since previous study. Large left pleural effusion. Increased opacity in the left upper lobe and persistent consolidation in the left lower lobe.  Electronically Signed By-Ivanna Mays MD On:2/26/2021 7:23 AM This report was finalized on [id number] by  Ivanna Mays MD.    Xr Chest 1 View    Result Date: 2/25/2021  Stable appearance of moderate sized left basilar pleural effusion with left mid to lower lung zone atelectasis or pneumonia.  Electronically Signed By-Adeline Lennon MD On:2/25/2021 11:19 AM This report was finalized on [id number] by  Adeline Lennon MD.      I personally reviewed patient's x-ray films and my findings are: 0    I personally reviewed patient's EKG strip and my findings are: 0    Medication Review:   I have reviewed the patient's current medication list 03/01/21     Scheduled Meds  albumin human, 37.5 g, Intravenous, Once    Or  albumin human, 50 g, Intravenous, Once    Or  albumin human, 62.5 g, Intravenous, Once    Or  albumin human, 75 g, Intravenous, Once    Or  albumin human, 87.5 g,  Intravenous, Once    Or  albumin human, 100 g, Intravenous, Once    Or  albumin human, 112.5 g, Intravenous, Once  docusate sodium, 100 mg, Oral, BID  famotidine, 40 mg, Oral, Daily  folic acid, 1 mg, Oral, Daily  lactulose, 20 g, Oral, BID  sodium chloride, 10 mL, Intravenous, Q12H  spironolactone, 50 mg, Oral, Daily  tamsulosin, 0.4 mg, Oral, Daily  thiamine, 500 mg, Oral, Daily  torsemide, 20 mg, Oral, Daily        Meds Infusions       DVT prophylaxis  Mechanical Order History:      Ordered        02/25/21 1404  Place Sequential Compression Device  Once         02/25/21 1404  Maintain Sequential Compression Device  Continuous                 Pharmalogical Order History:     None          Meds PRN  HYDROcodone-acetaminophen  •  ipratropium-albuterol  •  melatonin  •  nitroglycerin  •  ondansetron **OR** ondansetron  •  [COMPLETED] Insert peripheral IV **AND** sodium chloride  •  sodium chloride      León Mckenzie MD  03/01/21  15:12 EST

## 2021-03-01 NOTE — PLAN OF CARE
Pt able to ambulate longer distance this date using RW before needing seated rest break due to fatigue.  Pt's CT now off suction per RN allowing for increased mobility.  Continue plan for home with assist of daughter, HHPT, and use of RW.    Cynthia Humphreys PT, DPT, GCS

## 2021-03-01 NOTE — PROGRESS NOTES
"PULMONARY CRITICAL CARE Progress  NOTE      PATIENT IDENTIFICATION:  Name: Bereket Mckay  MRN: [id number]  :  1955     Age: 66 y.o.  Sex: male    DATE OF Note:  3/1/2021   Referring Physician: Tata Post MD                  Subjective:   Feeling better, no new issue, no SOB no chest pain, no nausea or vomiting, no change in bowel habit, no dysuria,  no new  skin rash or itching.      Objective:  tMax 24 hrs: Temp (24hrs), Av.6 °F (36.4 °C), Min:97.3 °F (36.3 °C), Max:97.8 °F (36.6 °C)      Vitals Ranges:   Temp:  [97.3 °F (36.3 °C)-97.8 °F (36.6 °C)] 97.3 °F (36.3 °C)  Heart Rate:  [75-77] 75  Resp:  [15-17] 15  BP: ()/(61-77) 98/61    Intake and Output Last 3 Shifts:   I/O last 3 completed shifts:  In: 480 [P.O.:480]  Out: 850 [Urine:200; Drains:650]    Exam:  BP 98/61   Pulse 75   Temp 97.3 °F (36.3 °C) (Oral)   Resp 15   Ht 182.9 cm (72\")   Wt 81.4 kg (179 lb 7.3 oz)   SpO2 98%   BMI 24.34 kg/m²     General Appearance:     HEENT:  Normocephalic, without obvious abnormality, Conjunctiva/corneas clear,.  Normal external ear canals, Nares normal, no drainage     Neck:  Supple, symmetrical, trachea midline. No JVD.  Lungs /Chest wall:   Bilateral basal rhonchi, respirations unlabored symmetrical wall movement.     Heart:  Regular rate and rhythm, systolic murmur PMI left sternal border  Abdomen: Soft, non-tender, no masses, no organomegaly.    Extremities: Trace edema no clubbing or Cyanosis        Medications:    Current Facility-Administered Medications:   •  albumin human 25 % IV SOLN 37.5 g, 37.5 g, Intravenous, Once **OR** albumin human 25 % IV SOLN 50 g, 50 g, Intravenous, Once **OR** albumin human 25 % IV SOLN 62.5 g, 62.5 g, Intravenous, Once **OR** albumin human 25 % IV SOLN 75 g, 75 g, Intravenous, Once **OR** albumin human 25 % IV SOLN 87.5 g, 87.5 g, Intravenous, Once **OR** albumin human 25 % IV SOLN 100 g, 100 g, Intravenous, Once **OR** albumin human 25 % IV SOLN 112.5 " g, 112.5 g, Intravenous, Once, Tata Post MD  •  docusate sodium (COLACE) capsule 100 mg, 100 mg, Oral, BID, Tata Post MD, 100 mg at 03/01/21 0848  •  famotidine (PEPCID) tablet 40 mg, 40 mg, Oral, Daily, Tata Post MD, 40 mg at 03/01/21 0848  •  folic acid (FOLVITE) tablet 1 mg, 1 mg, Oral, Daily, León Mckenzie MD, 1 mg at 03/01/21 0849  •  HYDROcodone-acetaminophen (NORCO) 5-325 MG per tablet 1 tablet, 1 tablet, Oral, Q6H PRN, León Mckenzie MD, 1 tablet at 03/01/21 0440  •  ipratropium-albuterol (DUO-NEB) nebulizer solution 3 mL, 3 mL, Nebulization, Q4H PRN, Becky Caballero APRN  •  lactulose (CHRONULAC) 10 GM/15ML solution 20 g, 20 g, Oral, BID, León Mckenzie MD, 20 g at 02/28/21 0855  •  melatonin tablet 5 mg, 5 mg, Oral, Nightly PRN, Tata Psot MD  •  nitroglycerin (NITROSTAT) SL tablet 0.4 mg, 0.4 mg, Sublingual, Q5 Min PRN, Tata Post MD  •  ondansetron (ZOFRAN) tablet 4 mg, 4 mg, Oral, Q6H PRN **OR** ondansetron (ZOFRAN) injection 4 mg, 4 mg, Intravenous, Q6H PRN, Tata Post MD  •  [COMPLETED] Insert peripheral IV, , , Once **AND** sodium chloride 0.9 % flush 10 mL, 10 mL, Intravenous, PRN, Parul Quezada, TAYE  •  sodium chloride 0.9 % flush 10 mL, 10 mL, Intravenous, Q12H, Tata Post MD, 10 mL at 03/01/21 0849  •  sodium chloride 0.9 % flush 10 mL, 10 mL, Intravenous, PRN, Tata Post MD, 10 mL at 02/25/21 2206  •  spironolactone (ALDACTONE) tablet 50 mg, 50 mg, Oral, Daily, León Mckenzie MD, 50 mg at 03/01/21 0849  •  tamsulosin (FLOMAX) 24 hr capsule 0.4 mg, 0.4 mg, Oral, Daily, Sydney Parkinson FNP, 0.4 mg at 03/01/21 0848  •  thiamine (VITAMIN B-1) tablet 500 mg, 500 mg, Oral, Daily, León Mckenzie MD, 500 mg at 03/01/21 0848  •  torsemide (DEMADEX) tablet 20 mg, 20 mg, Oral, Daily, León Mckenzie MD, 20 mg at 03/01/21 0851    Data Review:  All labs (24hrs):   Recent Results (from the past 24 hour(s))   Occult Blood, Fecal By Immunoassay - Stool, Per  Rectum    Collection Time: 02/28/21 12:23 PM    Specimen: Per Rectum; Stool   Result Value Ref Range    Occult Blood, Fecal by Immunoassay Negative Negative   Comprehensive Metabolic Panel    Collection Time: 03/01/21  3:24 AM    Specimen: Blood   Result Value Ref Range    Glucose 77 65 - 99 mg/dL    BUN 15 8 - 23 mg/dL    Creatinine 0.55 (L) 0.76 - 1.27 mg/dL    Sodium 135 (L) 136 - 145 mmol/L    Potassium 4.0 3.5 - 5.2 mmol/L    Chloride 103 98 - 107 mmol/L    CO2 25.0 22.0 - 29.0 mmol/L    Calcium 8.0 (L) 8.6 - 10.5 mg/dL    Total Protein 5.5 (L) 6.0 - 8.5 g/dL    Albumin 2.00 (L) 3.50 - 5.20 g/dL    ALT (SGPT) 11 1 - 41 U/L    AST (SGOT) 30 1 - 40 U/L    Alkaline Phosphatase 88 39 - 117 U/L    Total Bilirubin 1.0 0.0 - 1.2 mg/dL    eGFR Non African Amer 149 >60 mL/min/1.73    Globulin 3.5 gm/dL    A/G Ratio 0.6 g/dL    BUN/Creatinine Ratio 27.3 (H) 7.0 - 25.0    Anion Gap 7.0 5.0 - 15.0 mmol/L   CBC Auto Differential    Collection Time: 03/01/21  3:24 AM    Specimen: Blood   Result Value Ref Range    WBC 5.60 3.40 - 10.80 10*3/mm3    RBC 2.64 (L) 4.14 - 5.80 10*6/mm3    Hemoglobin 8.9 (L) 13.0 - 17.7 g/dL    Hematocrit 26.0 (L) 37.5 - 51.0 %    MCV 98.6 (H) 79.0 - 97.0 fL    MCH 33.7 (H) 26.6 - 33.0 pg    MCHC 34.2 31.5 - 35.7 g/dL    RDW 14.9 12.3 - 15.4 %    RDW-SD 52.1 37.0 - 54.0 fl    MPV 7.1 6.0 - 12.0 fL    Platelets 176 140 - 450 10*3/mm3    Neutrophil % 67.6 42.7 - 76.0 %    Lymphocyte % 12.6 (L) 19.6 - 45.3 %    Monocyte % 10.8 5.0 - 12.0 %    Eosinophil % 8.6 (H) 0.3 - 6.2 %    Basophil % 0.4 0.0 - 1.5 %    Neutrophils, Absolute 3.80 1.70 - 7.00 10*3/mm3    Lymphocytes, Absolute 0.70 0.70 - 3.10 10*3/mm3    Monocytes, Absolute 0.60 0.10 - 0.90 10*3/mm3    Eosinophils, Absolute 0.50 (H) 0.00 - 0.40 10*3/mm3    Basophils, Absolute 0.00 0.00 - 0.20 10*3/mm3    nRBC 0.0 0.0 - 0.2 /100 WBC        Imaging:  XR Chest 1 View  Narrative: EXAMINATION: XR CHEST 1 VW-     DATE OF EXAM: 3/1/2021 5:42 AM      INDICATION: thoracentesis post; R06.00-Dyspnea, unspecified;  K70.31-Alcoholic cirrhosis of liver with ascites; L08-Pqcpaqu effusion,  not elsewhere classified; D64.9-Anemia, unspecified.     COMPARISON: Chest radiograph dated 2/28/2021     TECHNIQUE: Portable AP view of the chest was obtained.     FINDINGS:  There is a left basilar chest tube present. There is a focal kink in the  tubing which may cause some dysfunction. Correlate clinically with  output. There is a small left apical pneumothorax measuring 8 mm. There  is unchanged left basilar airspace opacity likely representing  atelectasis. The right lung is grossly clear without consolidation,  effusion, or pneumothorax. There are degenerative changes of the  thoracic spine.     Impression: 1. Stable small left apical pneumothorax measuring 8 mm.  2. Focal kink in the tubing of the left basilar small bore chest tube.  Correlate clinically for output or findings of dysfunction.  3. Left basilar airspace opacity, similar to the prior examination  likely representing atelectasis.     Electronically Signed By-Destin Montenegro MD On:3/1/2021 7:47 AM  This report was finalized on [id number] by  Destin Montenegro MD.       ASSESSMENT:  Ascites due to alcoholic cirrhosis (CMS/HCC)    Pleural effusion on left    Iron deficiency anemia    Moderate protein-calorie malnutrition (CMS/HCC)    Postprocedural pneumothorax       PLAN:  Changes chest tube to waterseal  Repeat chest x-ray in the morning might consider adjusting the tube  Bronchodilator  Inhaled corticosteroids  Electrolytes/ glycemic control  DVT and GI prophylaxis.    Total Critical care time in direct medical management (   ) minutes  Segundo Cobb MD. D, ABSM.     3/1/2021  12:02 EST

## 2021-03-02 ENCOUNTER — APPOINTMENT (OUTPATIENT)
Dept: GENERAL RADIOLOGY | Facility: HOSPITAL | Age: 66
End: 2021-03-02

## 2021-03-02 LAB
ALBUMIN SERPL-MCNC: 2.1 G/DL (ref 3.5–5.2)
ALBUMIN/GLOB SERPL: 0.6 G/DL
ALP SERPL-CCNC: 83 U/L (ref 39–117)
ALT SERPL W P-5'-P-CCNC: 10 U/L (ref 1–41)
ANION GAP SERPL CALCULATED.3IONS-SCNC: 8 MMOL/L (ref 5–15)
AST SERPL-CCNC: 33 U/L (ref 1–40)
BACTERIA SPEC AEROBE CULT: NORMAL
BACTERIA SPEC AEROBE CULT: NORMAL
BASOPHILS # BLD AUTO: 0 10*3/MM3 (ref 0–0.2)
BASOPHILS NFR BLD AUTO: 0.5 % (ref 0–1.5)
BILIRUB SERPL-MCNC: 0.8 MG/DL (ref 0–1.2)
BUN SERPL-MCNC: 19 MG/DL (ref 8–23)
BUN/CREAT SERPL: 33.3 (ref 7–25)
CALCIUM SPEC-SCNC: 7.5 MG/DL (ref 8.6–10.5)
CHLORIDE SERPL-SCNC: 101 MMOL/L (ref 98–107)
CO2 SERPL-SCNC: 25 MMOL/L (ref 22–29)
CREAT SERPL-MCNC: 0.57 MG/DL (ref 0.76–1.27)
DEPRECATED RDW RBC AUTO: 51.6 FL (ref 37–54)
EOSINOPHIL # BLD AUTO: 0.5 10*3/MM3 (ref 0–0.4)
EOSINOPHIL NFR BLD AUTO: 8.7 % (ref 0.3–6.2)
ERYTHROCYTE [DISTWIDTH] IN BLOOD BY AUTOMATED COUNT: 14.9 % (ref 12.3–15.4)
GFR SERPL CREATININE-BSD FRML MDRD: 143 ML/MIN/1.73
GLOBULIN UR ELPH-MCNC: 3.8 GM/DL
GLUCOSE SERPL-MCNC: 75 MG/DL (ref 65–99)
HCT VFR BLD AUTO: 25.6 % (ref 37.5–51)
HGB BLD-MCNC: 8.9 G/DL (ref 13–17.7)
LAB AP CASE REPORT: NORMAL
LAB AP FLOW CYTOMETRY SUMMARY: NORMAL
LYMPHOCYTES # BLD AUTO: 0.8 10*3/MM3 (ref 0.7–3.1)
LYMPHOCYTES NFR BLD AUTO: 15.1 % (ref 19.6–45.3)
MCH RBC QN AUTO: 34 PG (ref 26.6–33)
MCHC RBC AUTO-ENTMCNC: 34.6 G/DL (ref 31.5–35.7)
MCV RBC AUTO: 98.1 FL (ref 79–97)
MONOCYTES # BLD AUTO: 0.7 10*3/MM3 (ref 0.1–0.9)
MONOCYTES NFR BLD AUTO: 13.2 % (ref 5–12)
NEUTROPHILS NFR BLD AUTO: 3.5 10*3/MM3 (ref 1.7–7)
NEUTROPHILS NFR BLD AUTO: 62.5 % (ref 42.7–76)
NRBC BLD AUTO-RTO: 0 /100 WBC (ref 0–0.2)
PATH REPORT.FINAL DX SPEC: NORMAL
PATH REPORT.GROSS SPEC: NORMAL
PLATELET # BLD AUTO: 179 10*3/MM3 (ref 140–450)
PMV BLD AUTO: 7.4 FL (ref 6–12)
POTASSIUM SERPL-SCNC: 4.1 MMOL/L (ref 3.5–5.2)
PROT SERPL-MCNC: 5.9 G/DL (ref 6–8.5)
RBC # BLD AUTO: 2.61 10*6/MM3 (ref 4.14–5.8)
REF LAB TEST METHOD: NORMAL
SODIUM SERPL-SCNC: 134 MMOL/L (ref 136–145)
WBC # BLD AUTO: 5.6 10*3/MM3 (ref 3.4–10.8)

## 2021-03-02 PROCEDURE — 99233 SBSQ HOSP IP/OBS HIGH 50: CPT | Performed by: INTERNAL MEDICINE

## 2021-03-02 PROCEDURE — 80053 COMPREHEN METABOLIC PANEL: CPT | Performed by: INTERNAL MEDICINE

## 2021-03-02 PROCEDURE — 25010000002 CEFTRIAXONE PER 250 MG: Performed by: INTERNAL MEDICINE

## 2021-03-02 PROCEDURE — 85025 COMPLETE CBC W/AUTO DIFF WBC: CPT | Performed by: INTERNAL MEDICINE

## 2021-03-02 PROCEDURE — 71045 X-RAY EXAM CHEST 1 VIEW: CPT

## 2021-03-02 RX ADMIN — CEFTRIAXONE SODIUM 2 G: 2 INJECTION, POWDER, FOR SOLUTION INTRAMUSCULAR; INTRAVENOUS at 16:13

## 2021-03-02 RX ADMIN — DOCUSATE SODIUM 100 MG: 100 CAPSULE, LIQUID FILLED ORAL at 08:11

## 2021-03-02 RX ADMIN — HYDROCODONE BITARTRATE AND ACETAMINOPHEN 1 TABLET: 5; 325 TABLET ORAL at 20:14

## 2021-03-02 RX ADMIN — TORSEMIDE 20 MG: 10 TABLET ORAL at 08:16

## 2021-03-02 RX ADMIN — DOCUSATE SODIUM 100 MG: 100 CAPSULE, LIQUID FILLED ORAL at 20:14

## 2021-03-02 RX ADMIN — SPIRONOLACTONE 75 MG: 25 TABLET ORAL at 08:11

## 2021-03-02 RX ADMIN — Medication 10 ML: at 20:15

## 2021-03-02 RX ADMIN — FOLIC ACID 1 MG: 1 TABLET ORAL at 08:12

## 2021-03-02 RX ADMIN — FAMOTIDINE 40 MG: 20 TABLET, FILM COATED ORAL at 08:12

## 2021-03-02 RX ADMIN — Medication 10 ML: at 08:12

## 2021-03-02 RX ADMIN — Medication 500 MG: at 08:11

## 2021-03-02 RX ADMIN — TAMSULOSIN HYDROCHLORIDE 0.4 MG: 0.4 CAPSULE ORAL at 08:11

## 2021-03-02 NOTE — PROGRESS NOTES
[facility] Medicine Services  INPATIENT PROGRESS NOTE  223/1   Hospitalist Team  LOS 4 days      Patient Care Team:  Aditi Mcfarland APRN as PCP - General (Nurse Practitioner)      Patient was examined with relevant and adequate PPE keeping in mind the current coronavirus pandemic. Minimum of 10 minutes to don and doff PPE.    Chief Complaint / Subjective  Chief Complaint   Patient presents with   • Shortness of Breath     SOA,ascities, swelling to legs, for 2 day pt also fatigued       HPI (4 hpi elements or status of 3 chronic)  Mr. Mckay is a 66 y.o.  with history of liver cirrhosis due to alcohol abuse, abdominal ascites who presents today with his daughter at the bedside with complaints of shortness of breath.  Daughter at the bedside provides most of the information.  She states the patient was admitted to the hospital 1 month ago after a fall that resulted in hip fracture.  She states at that time he had his fracture repaired but was also incidentally found to have findings of liver cirrhosis and abdominal ascites.  Patient states he has had increase in shortness of breath over the last 2 days worse with exertion.  He also reports an increase in his lower leg swelling right greater than left and also an increase in his abdominal swelling.  He denies leg pain.  He states he did have some pain in his abdomen a couple days ago but denies any pain currently.  He denies any nausea vomiting diarrhea, hematemesis black or bloody stool.  He denies any chest pain.  He denies any fever chills.  He does report a mild nonproductive cough.  He denies any urinary complaint.  Patient states he has had no alcohol since his last admission in January 2021.  States he has not yet followed up with gastroenterology.     Has not seen gastroenterology.  Not actively drinking.    No complaints. 02/28/21, 12:09 PM EST    Refuses rehab.  Still has a chest tube    No complaints.  Wants to go home. 03/02/21,  5:05 PM EST      Shortness of Breath          History taken from: patient chart    Review of Systems   Respiratory: Positive for shortness of breath.      Constitution: Negative.   HENT: Negative.    Eyes: Negative.    Cardiovascular: Negative.    Respiratory:        Shortness of breath   Endocrine: Negative.    Skin: Negative.    Musculoskeletal: Negative.    Gastrointestinal: Negative.    Genitourinary: Negative.    Neurological: Negative.    Psychiatric/Behavioral: Negative.    Allergic/Immunologic: Negative.    All other systems reviewed and are negative.  Reviewed, no change in above data from the prior day.      Family History   Family history unknown: Yes       Past Medical History:   Diagnosis Date   • Ascites 2021   • Dyspnea 2021       Social History     Socioeconomic History   • Marital status: Single     Spouse name: Not on file   • Number of children: Not on file   • Years of education: Not on file   • Highest education level: Not on file   Tobacco Use   • Smoking status: Former Smoker     Quit date:      Years since quittin.   • Smokeless tobacco: Current User     Types: Chew   Substance and Sexual Activity   • Alcohol use: Not Currently     Alcohol/week: 5.0 standard drinks     Types: 5 Cans of beer per week     Comment: drinks 5 beers every     • Drug use: Never   • Sexual activity: Defer   Social History Narrative    Lives by himself       Prior to Admission medications    Medication Sig Start Date End Date Taking? Authorizing Provider   furosemide (LASIX) 40 MG tablet Take 40 mg by mouth Daily.   Yes ProviderSammi MD   HYDROcodone-acetaminophen (NORCO) 5-325 MG per tablet Take 1 tablet by mouth Every 6 (Six) Hours As Needed.   Yes ProviderSammi MD   potassium chloride 10 MEQ CR tablet Take 10 mEq by mouth Daily.   Yes Sammi Mazariegos MD   tamsulosin (FLOMAX) 0.4 MG capsule 24 hr capsule Take 1 capsule by mouth Daily. 1/15/21  Yes Franck  "MD Trevor        Objective    Physical Exam     Vital Signs  Temp:  [97.6 °F (36.4 °C)-97.9 °F (36.6 °C)] 97.6 °F (36.4 °C)  Heart Rate:  [62-74] 74  Resp:  [15-18] 18  BP: ()/(59-60) 91/59    Oxygen Therapy  SpO2: 98 %  Pulse Oximetry Type: Intermittent  Device (Oxygen Therapy): room air  Flowsheet Rows      First Filed Value   Admission Height  182.9 cm (72\") Documented at 02/25/2021 1023   Admission Weight  82.2 kg (181 lb 3.5 oz) Documented at 02/25/2021 1025        Weight change:    Intake & Output (last 3 days)       02/27 0701 - 02/28 0700 02/28 0701 - 03/01 0700 03/01 0701 - 03/02 0700 03/02 0701 - 03/03 0700    P.O.  360    I.V. (mL/kg) 0 (0) 0 (0)      Total Intake(mL/kg) 480 (5.9) 240 (2.9) 1200 (14.1) 360 (4.2)    Urine (mL/kg/hr) 840 (0.4)  2600 (1.3) 2300 (2.7)    Drains 2080 450  350    Total Output 2920 450 2600 2650    Net -2440 -210 -1400 -2290                Lines, Drains & Airways    Active LDAs     Name:   Placement date:   Placement time:   Site:   Days:    Peripheral IV 02/25/21 1058 Right Forearm   02/25/21    1058    Forearm   1                Physical Exam:    Physical Exam  Vitals signs and nursing note reviewed.   Constitutional:       General: He is not in acute distress.     Appearance: Normal appearance. He is well-developed. He is not ill-appearing, toxic-appearing or diaphoretic.   HENT:      Head: Normocephalic and atraumatic.      Right Ear: Ear canal and external ear normal.      Left Ear: Ear canal and external ear normal.      Nose: Nose normal. No congestion or rhinorrhea.      Mouth/Throat:      Mouth: Mucous membranes are moist.      Pharynx: No oropharyngeal exudate.   Eyes:      General: No scleral icterus.        Right eye: No discharge.         Left eye: No discharge.      Extraocular Movements: Extraocular movements intact.      Conjunctiva/sclera: Conjunctivae normal.      Pupils: Pupils are equal, round, and reactive to light.   Neck:      " Musculoskeletal: Normal range of motion and neck supple. No neck rigidity or muscular tenderness.      Thyroid: No thyromegaly.      Vascular: No carotid bruit or JVD.      Trachea: No tracheal deviation.   Cardiovascular:      Rate and Rhythm: Normal rate and regular rhythm.      Heart sounds: Normal heart sounds. No murmur. No friction rub. No gallop.       Comments: Pedal pulses absent  Pulmonary:      Effort: Pulmonary effort is normal. No respiratory distress.      Breath sounds: No stridor. No wheezing, rhonchi or rales.      Comments: Absent breath sounds lower half left hemithorax posterior fields.    Left chest tube present now  Chest:      Chest wall: No tenderness.   Abdominal:      General: Bowel sounds are normal. There is distension.      Palpations: Abdomen is soft. There is no mass.      Tenderness: There is no abdominal tenderness. There is no guarding or rebound.      Hernia: No hernia is present.      Comments: Ascites moderate present   Musculoskeletal: Normal range of motion.         General: No swelling, tenderness, deformity or signs of injury.      Right lower leg: Edema present.      Left lower leg: Edema present.   Lymphadenopathy:      Cervical: No cervical adenopathy.   Skin:     General: Skin is warm and dry.      Coloration: Skin is not jaundiced or pale.      Findings: No bruising, erythema or rash.   Neurological:      General: No focal deficit present.      Mental Status: He is alert and oriented to person, place, and time. Mental status is at baseline.      Cranial Nerves: No cranial nerve deficit.      Sensory: No sensory deficit.      Motor: No weakness or abnormal muscle tone.      Coordination: Coordination normal.      Comments: Asterixis absent   Psychiatric:         Mood and Affect: Mood normal.         Behavior: Behavior normal.         Thought Content: Thought content normal.         Judgment: Judgment normal.     Noted      PT Recommendation and Plan                  Procedures:    * No surgery found *     Assessment/Plan with Problem wise       Active Hospital Problems    Diagnosis  POA   • **Ascites due to alcoholic cirrhosis (CMS/HCC) [K70.31]  Yes     Priority: High   • Postprocedural pneumothorax [J95.811]  No   • Iron deficiency anemia [D50.9]  Yes   • Moderate protein-calorie malnutrition (CMS/HCC) [E44.0]  Yes   • Pleural effusion on left [J90]  Yes      Resolved Hospital Problems   No resolved problems to display.        Estimated Creatinine Clearance: 107.7 mL/min (A) (by C-G formula based on SCr of 0.57 mg/dL (L)).    Code Status and Medical Interventions:   Ordered at: 02/25/21 1404     Level Of Support Discussed With:    Patient     Code Status:    CPR     Medical Interventions (Level of Support Prior to Arrest):    Full       MEDICAL DECISION MAKING COMPLEXITY BY PROBLEM:       Cirrhosis:  Follow labs  Ultrasound liver if appropriate  Avoid undue sedation  Avoid narcotics  Low-salt diet  GI input    Anemia:  Check iron profile  Check B12 folate  Check ESR  Check for any blood loss  Supplement as needed  Transfusion if appropriate  Consult if appropriate  Venofer for iron deficiency    Left-sided moderate pleural effusion.  Counseled patient and daughter.  After thoracentesis the pleural effusion will rapidly come back if primary etiology not controlled..  Noted the patient is not on diuretics.  Started dose.    Care coordination with nursing for current care and discussed with patient and daughter at bedside.  Patient new to me and extensive chart review needed 02/27/21 10:33 AM EST.    Discussed with patient and daughter at bedside.  Most likely will need rehab placement.    Empyema per Cultures  Add ceftriaxone    Discussed with patient and daughter.  Discharge after chest tube removed    Discussed with daughter again.  Questions about diuretics and cirrhosis answered.  Awaiting chest tube removal by pulmonology for discharge plan    Discussed with Dr. Lloyd  regarding chest tube.    Plan for disposition:  anticipate pt will need 30 days or less of rehab            Continued Care and Services - Admitted Since 2/25/2021    Coordination has not been started for this encounter.     Selected Continued Care - Prior Encounters Includes selections from prior encounters from 11/27/2020 to 2/27/2021    Discharged on 1/15/2021 Admission date: 1/10/2021 - Discharge disposition: Home-Health Care [facility]    Home Medical Care     Service Provider Selected Services Address Phone Fax Patient Preferred    [facility] [address] [place] [address] [phone] --                     Historical & Objective Data     Results Review:    I reviewed the patient's new lab and radiology results. 03/02/21     Results from last 7 days   Lab Units 03/02/21  0250 03/01/21  0324 02/28/21  0425   WBC 10*3/mm3 5.60 5.60 5.50   HEMOGLOBIN g/dL 8.9* 8.9* 9.2*   HEMATOCRIT % 25.6* 26.0* 26.5*   MCV fL 98.1* 98.6* 98.5*   MCH pg 34.0* 33.7* 34.0*   MPV fL 7.4 7.1 7.0   RDW % 14.9 14.9 15.0   PLATELETS 10*3/mm3 179 176 174     Results from last 7 days   Lab Units 03/02/21  0250 03/01/21  0324 02/28/21  1110  02/25/21  1054   SODIUM mmol/L 134* 135* 138   < > 136   POTASSIUM mmol/L 4.1 4.0 3.6   < > 3.9   CHLORIDE mmol/L 101 103 103   < > 102   CO2 mmol/L 25.0 25.0 25.0   < > 25.0   BUN mg/dL 19 15 12   < > 10   CREATININE mg/dL 0.57* 0.55* 0.69*   < > 0.66*   CALCIUM mg/dL 7.5* 8.0* 8.3*   < > 8.1*   MAGNESIUM mg/dL  --   --   --   --  1.9   BILIRUBIN mg/dL 0.8 1.0 1.1   < > 1.9*   ALK PHOS U/L 83 88 95   < > 99   ALT (SGPT) U/L 10 11 11   < > 13   AST (SGOT) U/L 33 30 32   < > 43*   GLUCOSE mg/dL 75 77 90   < > 92    < > = values in this interval not displayed.     Inflammatory Biomarkers  Results from last 7 days   Lab Units 02/27/21  0615   FERRITIN ng/mL 184.50     No results found for: PHOS  No results found for: HGBA1C  No results found for:  CHOL, CHLPL, TRIG, HDL, LDL, LDLDIRECT  No results found for: LIPASE  Results from last 7 days   Lab Units 02/25/21  1054   INR  1.43*       Results from last 7 days   Lab Units 02/25/21  1431   PH, ARTERIAL pH units 7.469*   PO2 ART mm Hg 82.6*   PCO2, ARTERIAL mm Hg 34.1*   HCO3 ART mmol/L 24.8     Pathology  Lab Results   Lab Value Date/Time    FINALDX  02/26/2021 1338     Peritoneal fluid, smears and cytospin preparation:    Benign mesothelial cells, histiocytes and acute and chronic inflammatory cells    Negative for malignant cells    JPR/sms        COVID19   Date Value Ref Range Status   02/25/2021 Not Detected Not Detected - Ref. Range Final        Microbiology Results (last 10 days)     Procedure Component Value - Date/Time    Anaerobic Culture - Pleural Fluid, Pleural Cavity [[id number]] Collected: 02/27/21 1227    Lab Status: Preliminary result Specimen: Pleural Fluid from Pleural Cavity Updated: 03/02/21 0752     Anaerobic Culture No anaerobes isolated at 3 days    Body Fluid Culture - Body Fluid, Pleural Cavity [[id number]]  (Abnormal) Collected: 02/27/21 1227    Lab Status: Preliminary result Specimen: Body Fluid from Pleural Cavity Updated: 03/02/21 0709     Body Fluid Culture Staphylococcus, coagulase negative     Comment: In broth only.          Gram Stain Many (4+) WBCs per low power field      No organisms seen    AFB Culture - Body Fluid, Pleural Cavity [[id number]] Collected: 02/27/21 1227    Lab Status: Preliminary result Specimen: Body Fluid from Pleural Cavity Updated: 02/28/21 1110     AFB Stain No acid fast bacilli seen    Body Fluid Culture - Body Fluid, Peritoneum [[id number]] Collected: 02/26/21 8879    Lab Status: Preliminary result Specimen: Body Fluid from Peritoneum Updated: 03/02/21 0732     Body Fluid Culture No growth at 4 days     Gram Stain Rare (1+) WBCs seen      No organisms seen    Anaerobic Culture - Body Fluid, Peritoneum [[id number]] Collected: 02/26/21 1339    Lab Status:  Preliminary result Specimen: Body Fluid from Peritoneum Updated: 03/02/21 0732     Anaerobic Culture No anaerobes isolated at 4 days    Blood Culture - Blood, Arm, Left [[id number]] Collected: 02/25/21 1108    Lab Status: Final result Specimen: Blood from Arm, Left Updated: 03/02/21 1115     Blood Culture No growth at 5 days    Respiratory Panel PCR w/COVID-19(SARS-CoV-2) JUAN/FATUMA/ELLIE/PAD/COR/MAD/CHAVO In-House, NP Swab in UTM/VTM, 3-4 HR TAT - Swab, Nasopharynx [[id number]]  (Normal) Collected: 02/25/21 1056    Lab Status: Final result Specimen: Swab from Nasopharynx Updated: 02/25/21 1201     ADENOVIRUS, PCR Not Detected     Coronavirus 229E Not Detected     Coronavirus HKU1 Not Detected     Coronavirus NL63 Not Detected     Coronavirus OC43 Not Detected     COVID19 Not Detected     Human Metapneumovirus Not Detected     Human Rhinovirus/Enterovirus Not Detected     Influenza A PCR Not Detected     Influenza B PCR Not Detected     Parainfluenza Virus 1 Not Detected     Parainfluenza Virus 2 Not Detected     Parainfluenza Virus 3 Not Detected     Parainfluenza Virus 4 Not Detected     RSV, PCR Not Detected     Bordetella pertussis pcr Not Detected     Bordetella parapertussis PCR Not Detected     Chlamydophila pneumoniae PCR Not Detected     Mycoplasma pneumo by PCR Not Detected    Narrative:      Fact sheet for providers: https://docs.[employer]/wp-content/uploads/VBI5496-1688-ML6.1-EUA-Provider-Fact-Sheet-3.pdf    Fact sheet for patients: https://docs.[employer]/wp-content/uploads/NFX4366-1282-DD6.1-EUA-Patient-Fact-Sheet-1.pdf    Test performed by PCR.    Blood Culture - Blood, Arm, Right [[id number]] Collected: 02/25/21 1054    Lab Status: Final result Specimen: Blood from Arm, Right Updated: 03/02/21 1115     Blood Culture No growth at 5 days          ECG/EMG Results (most recent)     Procedure Component Value Units Date/Time    ECG 12 Lead [[id number]] Collected: 02/25/21 1046     Updated: 02/28/21 1320     QT  Interval 419 ms     Narrative:      HEART RATE= 70  bpm  RR Interval= 856  ms  UT Interval= 188  ms  P Horizontal Axis= 41  deg  P Front Axis= 12  deg  QRSD Interval= 100  ms  QT Interval= 419  ms  QRS Axis= -4  deg  T Wave Axis= -1  deg  - OTHERWISE NORMAL ECG -  Sinus rhythm  Low voltage, precordial leads  When compared with ECG of 11-Jan-2021 15:25:50,  Significant rate decrease  Electronically Signed By: Phuc Giordano ([facility]) 28-Feb-2021 13:19:45  Date and Time of Study: 2021-02-25 10:46:58          Results for orders placed during the hospital encounter of 02/25/21   Duplex Venous Lower Extremity - Right CAR    Narrative · Normal right lower extremity venous duplex scan.          Results for orders placed during the hospital encounter of 01/10/21   Adult Transthoracic Echo Complete W/ Cont if Necessary Per Protocol    Narrative · Left ventricular ejection fraction appears to be 61 - 65%.  · Left ventricular diastolic function is consistent with (grade I)   impaired relaxation.  · The right ventricular cavity is mild to moderately dilated.  · Typically difficult study with limited acoustical windows; limited   M-mode measurements and limited Doppler assessment  · No specific Doppler abnormalities appreciated on limited study; normal   RV systolic pressure          Ct Abdomen Pelvis With Contrast    Result Date: 2/25/2021   1. Small lobulated cirrhotic appearing liver 2. Large amount of ascites throughout the abdomen pelvis. Unchanged 3. Cholelithiasis. Small 5 mm size calcified stone in the gallbladder lumen unchanged.. 4. Moderate-large pleural effusion posterior aspect left lower lobe along with moderate-large atelectatic consolidating infiltrate left lower lobe along unchanged since previous study  Electronically Signed By-Ezequiel Marley MD On:2/25/2021 12:38 PM This report was finalized on [id number] by  Ezequiel Marley MD.    Xr Chest 1 View    Result Date: 3/2/2021   1. Slight interval increase  in the small left apical pneumothorax compared to one day prior. Left basilar pigtail drain remains in place. 2. Left basilar airspace disease favored to represent atelectasis, and small left pleural effusion, without significant change.  Electronically Signed By-Adeline Lennon MD On:3/2/2021 8:03 AM This report was finalized on [id number] by  Adeline Lennon MD.    Xr Chest 1 View    Result Date: 3/1/2021  1. Stable small left apical pneumothorax measuring 8 mm. 2. Focal kink in the tubing of the left basilar small bore chest tube. Correlate clinically for output or findings of dysfunction. 3. Left basilar airspace opacity, similar to the prior examination likely representing atelectasis.  Electronically Signed By-Destin Montenegro MD On:3/1/2021 7:47 AM This report was finalized on [id number] by  Destin Montenegro MD.    Xr Chest 1 View    Result Date: 2/28/2021    1.  No change in left thoracostomy tube and small left apical pneumothorax. 2.  No change in left basilar airspace disease.  Electronically Signed By-Ruben Urbina MD On:2/28/2021 8:37 AM This report was finalized on [id number] by  Ruben Urbina MD.    Xr Chest 1 View    Result Date: 2/27/2021  Interval placement of a left-sided chest tube with decrease in the amount of left pleural fluid. There is now a small left apical pneumothorax. The small bore thoracostomy tube does appear to be kinked.  Electronically Signed By-Phuc Diaz MD On:2/27/2021 1:14 PM This report was finalized on [id number] by  Phuc Diaz MD.    Xr Chest 1 View    Result Date: 2/27/2021  Slight decrease in the size of the left pleural effusion in the interval.  Electronically Signed By-Phuc Diaz MD On:2/27/2021 8:48 AM This report was finalized on [id number] by  Phuc Diaz MD.    Xr Chest 1 View    Result Date: 2/26/2021  Worse appearance since previous study. Large left pleural effusion. Increased opacity in the left upper lobe and persistent consolidation  in the left lower lobe.  Electronically Signed By-Ivanna Mays MD On:2/26/2021 7:23 AM This report was finalized on [id number] by  Ivanna Mays MD.    Xr Chest 1 View    Result Date: 2/25/2021  Stable appearance of moderate sized left basilar pleural effusion with left mid to lower lung zone atelectasis or pneumonia.  Electronically Signed By-Adeline Lennon MD On:2/25/2021 11:19 AM This report was finalized on [id number] by  Adeline Lennon MD.      I personally reviewed patient's x-ray films and my findings are: 0    I personally reviewed patient's EKG strip and my findings are: 0    Medication Review:   I have reviewed the patient's current medication list 03/02/21     Scheduled Meds  albumin human, 37.5 g, Intravenous, Once    Or  albumin human, 50 g, Intravenous, Once    Or  albumin human, 62.5 g, Intravenous, Once    Or  albumin human, 75 g, Intravenous, Once    Or  albumin human, 87.5 g, Intravenous, Once    Or  albumin human, 100 g, Intravenous, Once    Or  albumin human, 112.5 g, Intravenous, Once  cefTRIAXone, 2 g, Intravenous, Q24H  docusate sodium, 100 mg, Oral, BID  famotidine, 40 mg, Oral, Daily  folic acid, 1 mg, Oral, Daily  lactulose, 20 g, Oral, BID  sodium chloride, 10 mL, Intravenous, Q12H  spironolactone, 75 mg, Oral, Daily  tamsulosin, 0.4 mg, Oral, Daily  thiamine, 500 mg, Oral, Daily  torsemide, 20 mg, Oral, Daily        Meds Infusions       DVT prophylaxis  Mechanical Order History:      Ordered        02/25/21 1404  Place Sequential Compression Device  Once         02/25/21 1404  Maintain Sequential Compression Device  Continuous                 Pharmalogical Order History:     None          Meds PRN  HYDROcodone-acetaminophen  •  ipratropium-albuterol  •  melatonin  •  nitroglycerin  •  ondansetron **OR** ondansetron  •  [COMPLETED] Insert peripheral IV **AND** sodium chloride  •  sodium chloride      León Mckenzie MD  03/02/21  17:03 EST

## 2021-03-02 NOTE — PROGRESS NOTES
Continued Stay Note   [place]     Patient Name: Bereket Mckay  MRN: [id number]  Today's Date: 3/2/2021    Admit Date: 2/25/2021    Discharge Plan     Row Name 03/02/21 1227       Plan    Plan Comments  [employer] notified MD of pt lack of Rx coverage. MD will work towards most affordable medications. Pt has been changed to meds to beds. SW alerted  Pharmacy of pt lack of Rx.  [facility] will monitor for patient medications and let  know if any Rx is too costly.        Discharge Codes    No documentation.       Expected Discharge Date and Time     Expected Discharge Date Expected Discharge Time    Mar 3, 2021           [facility]    Phone # [phone]  Cell #[phone]  Fax#[phone]  [email or web address]    [facility]

## 2021-03-02 NOTE — PROGRESS NOTES
"PULMONARY CRITICAL CARE Progress  NOTE      PATIENT IDENTIFICATION:  Name: Bereket Mckay  MRN: [id number]  :  1955     Age: 66 y.o.  Sex: male    DATE OF Note:  3/2/2021   Referring Physician: Tata Post MD                  Subjective:   No new issue  Chest tube still draining and  No SOB no chest pain, no nausea or vomiting, no change in bowel habit, no dysuria,  no new  skin rash or itching.      Objective:  tMax 24 hrs: Temp (24hrs), Av.7 °F (36.5 °C), Min:97.6 °F (36.4 °C), Max:97.9 °F (36.6 °C)      Vitals Ranges:   Temp:  [97.6 °F (36.4 °C)-97.9 °F (36.6 °C)] 97.6 °F (36.4 °C)  Heart Rate:  [62-74] 74  Resp:  [15-18] 18  BP: ()/(59-60) 91/59    Intake and Output Last 3 Shifts:   I/O last 3 completed shifts:  In: 1200 [P.O.:1200]  Out: 2600 [Urine:2600]    Exam:  BP 91/59 (BP Location: Right arm, Patient Position: Lying)   Pulse 74   Temp 97.6 °F (36.4 °C) (Oral)   Resp 18   Ht 182.9 cm (72\")   Wt 85 kg (187 lb 6.3 oz)   SpO2 98%   BMI 25.41 kg/m²     General Appearance:   Alert awake not in distress  HEENT:  Normocephalic, without obvious abnormality, Conjunctiva/corneas clear,.  Normal external ear canals, Nares normal, no drainage     Neck:  Supple, symmetrical, trachea midline. No JVD.  Lungs /Chest wall: Chest tube in place bilateral basal rhonchi, respirations unlabored symmetrical wall movement.     Heart:  Regular rate and rhythm, systolic murmur PMI left sternal border  Abdomen: Soft, non-tender, no masses, no organomegaly.    Extremities: Trace edema no clubbing or Cyanosis        Medications:    Current Facility-Administered Medications:   •  albumin human 25 % IV SOLN 37.5 g, 37.5 g, Intravenous, Once **OR** albumin human 25 % IV SOLN 50 g, 50 g, Intravenous, Once **OR** albumin human 25 % IV SOLN 62.5 g, 62.5 g, Intravenous, Once **OR** albumin human 25 % IV SOLN 75 g, 75 g, Intravenous, Once **OR** albumin human 25 % IV SOLN 87.5 g, 87.5 g, Intravenous, Once **OR** " albumin human 25 % IV SOLN 100 g, 100 g, Intravenous, Once **OR** albumin human 25 % IV SOLN 112.5 g, 112.5 g, Intravenous, Once, Tata Post MD  •  cefTRIAXone (ROCEPHIN) 2 g in sodium chloride 0.9 % 100 mL IVPB, 2 g, Intravenous, Q24H, León Mckenzie MD, Last Rate: 200 mL/hr at 03/02/21 1613, 2 g at 03/02/21 1613  •  docusate sodium (COLACE) capsule 100 mg, 100 mg, Oral, BID, Tata Post MD, 100 mg at 03/02/21 0811  •  famotidine (PEPCID) tablet 40 mg, 40 mg, Oral, Daily, Tata Post MD, 40 mg at 03/02/21 0812  •  folic acid (FOLVITE) tablet 1 mg, 1 mg, Oral, Daily, León Mckenzie MD, 1 mg at 03/02/21 0812  •  HYDROcodone-acetaminophen (NORCO) 5-325 MG per tablet 1 tablet, 1 tablet, Oral, Q6H PRN, León Mckenzie MD, 1 tablet at 03/01/21 2047  •  ipratropium-albuterol (DUO-NEB) nebulizer solution 3 mL, 3 mL, Nebulization, Q4H PRN, Becky Caballero, APRN  •  lactulose (CHRONULAC) 10 GM/15ML solution 20 g, 20 g, Oral, BID, León Mckenzie MD, 20 g at 02/28/21 0855  •  melatonin tablet 5 mg, 5 mg, Oral, Nightly PRN, Tata Post MD  •  nitroglycerin (NITROSTAT) SL tablet 0.4 mg, 0.4 mg, Sublingual, Q5 Min PRN, Tata Post MD  •  ondansetron (ZOFRAN) tablet 4 mg, 4 mg, Oral, Q6H PRN **OR** ondansetron (ZOFRAN) injection 4 mg, 4 mg, Intravenous, Q6H PRN, Tata Post MD  •  [COMPLETED] Insert peripheral IV, , , Once **AND** sodium chloride 0.9 % flush 10 mL, 10 mL, Intravenous, PRN, Parul Quezada, APRN  •  sodium chloride 0.9 % flush 10 mL, 10 mL, Intravenous, Q12H, Tata Post MD, 10 mL at 03/02/21 0812  •  sodium chloride 0.9 % flush 10 mL, 10 mL, Intravenous, PRN, Tata Post MD, 10 mL at 02/25/21 2206  •  spironolactone (ALDACTONE) tablet 75 mg, 75 mg, Oral, Daily, León Mckenzie MD, 75 mg at 03/02/21 0811  •  tamsulosin (FLOMAX) 24 hr capsule 0.4 mg, 0.4 mg, Oral, Daily, Sydney Parkinson FNP, 0.4 mg at 03/02/21 0811  •  thiamine (VITAMIN B-1) tablet 500 mg, 500 mg, Oral, Daily,  León Mckenzie MD, 500 mg at 03/02/21 0811  •  torsemide (DEMADEX) tablet 20 mg, 20 mg, Oral, Daily, León Mckenzie MD, 20 mg at 03/02/21 0816    Data Review:  All labs (24hrs):   Recent Results (from the past 24 hour(s))   Comprehensive Metabolic Panel    Collection Time: 03/02/21  2:50 AM    Specimen: Blood   Result Value Ref Range    Glucose 75 65 - 99 mg/dL    BUN 19 8 - 23 mg/dL    Creatinine 0.57 (L) 0.76 - 1.27 mg/dL    Sodium 134 (L) 136 - 145 mmol/L    Potassium 4.1 3.5 - 5.2 mmol/L    Chloride 101 98 - 107 mmol/L    CO2 25.0 22.0 - 29.0 mmol/L    Calcium 7.5 (L) 8.6 - 10.5 mg/dL    Total Protein 5.9 (L) 6.0 - 8.5 g/dL    Albumin 2.10 (L) 3.50 - 5.20 g/dL    ALT (SGPT) 10 1 - 41 U/L    AST (SGOT) 33 1 - 40 U/L    Alkaline Phosphatase 83 39 - 117 U/L    Total Bilirubin 0.8 0.0 - 1.2 mg/dL    eGFR Non African Amer 143 >60 mL/min/1.73    Globulin 3.8 gm/dL    A/G Ratio 0.6 g/dL    BUN/Creatinine Ratio 33.3 (H) 7.0 - 25.0    Anion Gap 8.0 5.0 - 15.0 mmol/L   CBC Auto Differential    Collection Time: 03/02/21  2:50 AM    Specimen: Blood   Result Value Ref Range    WBC 5.60 3.40 - 10.80 10*3/mm3    RBC 2.61 (L) 4.14 - 5.80 10*6/mm3    Hemoglobin 8.9 (L) 13.0 - 17.7 g/dL    Hematocrit 25.6 (L) 37.5 - 51.0 %    MCV 98.1 (H) 79.0 - 97.0 fL    MCH 34.0 (H) 26.6 - 33.0 pg    MCHC 34.6 31.5 - 35.7 g/dL    RDW 14.9 12.3 - 15.4 %    RDW-SD 51.6 37.0 - 54.0 fl    MPV 7.4 6.0 - 12.0 fL    Platelets 179 140 - 450 10*3/mm3    Neutrophil % 62.5 42.7 - 76.0 %    Lymphocyte % 15.1 (L) 19.6 - 45.3 %    Monocyte % 13.2 (H) 5.0 - 12.0 %    Eosinophil % 8.7 (H) 0.3 - 6.2 %    Basophil % 0.5 0.0 - 1.5 %    Neutrophils, Absolute 3.50 1.70 - 7.00 10*3/mm3    Lymphocytes, Absolute 0.80 0.70 - 3.10 10*3/mm3    Monocytes, Absolute 0.70 0.10 - 0.90 10*3/mm3    Eosinophils, Absolute 0.50 (H) 0.00 - 0.40 10*3/mm3    Basophils, Absolute 0.00 0.00 - 0.20 10*3/mm3    nRBC 0.0 0.0 - 0.2 /100 WBC        Imaging:  XR Chest 1 View  Narrative: DATE OF  "EXAM:  3/2/2021 2:02 AM     PROCEDURE:  XR CHEST 1 VW-     INDICATIONS:  thoracentesis post; R06.00-Dyspnea, unspecified; K70.31-Alcoholic  cirrhosis of liver with ascites; T89-Afawojl effusion, not elsewhere  classified; D64.9-Anemia, unspecified       COMPARISON:  AP portable chest 03/01/2021.     TECHNIQUE:   Single radiographic view of the chest was obtained.     FINDINGS:  Left basilar small bore pleural catheter is again seen. The left apical  pneumothorax appears increased, now measuring up to 19 mm thickness  compared to 8 mm thickness on the prior exam. Small left basilar pleural  effusion is stable. Left basilar airspace disease appears unchanged, may  represent atelectasis. Heart size is mildly enlarged but stable. The  right lung is free of acute airspace disease. No acute osseous  abnormalities are identified.     Impression:    1. Slight interval increase in the small left apical pneumothorax  compared to one day prior. Left basilar pigtail drain remains in place.  2. Left basilar airspace disease favored to represent atelectasis, and  small left pleural effusion, without significant change.     Electronically Signed By-Adeline Lennon MD On:3/2/2021 8:03 AM  This report was finalized on [id number] by  Adeline Lennon MD.       ASSESSMENT:  Ascites due to alcoholic cirrhosis (CMS/HCC)    Pleural effusion on left    Iron deficiency anemia    Moderate protein-calorie malnutrition (CMS/HCC)    Postprocedural pneumothorax       PLAN:  Changes chest tube to waterseal, still have some draining, and it is transudate\" discontinue the chest tube now and monitor clinically I doubt it is due to cirrhosis because usually on the right side more than the left  Repeat chest x-ray in the morning might consider adjusting the tube  Bronchodilator  Inhaled corticosteroids  Electrolytes/ glycemic control  DVT and GI prophylaxis.    Total Critical care time in direct medical management (   ) minutes  Segundo Cobb MD. " SHRUTHI, ABSSILVANA.     3/2/2021  17:39 EST

## 2021-03-02 NOTE — PLAN OF CARE
Goal Outcome Evaluation:  Plan of Care Reviewed With: patient  Progress: improving  Outcome Summary: Pt is resting in bed. Pt shows no s/s of distress and vitals are stable. Daughter is at bedside. Pt voiced concerns of pain which was relieved by PRN pain medication. Chest tube is not disconnected from wall suction per doctors orders. Call light within reach. Will continue to monitor.

## 2021-03-03 ENCOUNTER — APPOINTMENT (OUTPATIENT)
Dept: GENERAL RADIOLOGY | Facility: HOSPITAL | Age: 66
End: 2021-03-03

## 2021-03-03 VITALS
WEIGHT: 187.39 LBS | SYSTOLIC BLOOD PRESSURE: 93 MMHG | HEIGHT: 72 IN | DIASTOLIC BLOOD PRESSURE: 58 MMHG | OXYGEN SATURATION: 94 % | HEART RATE: 66 BPM | BODY MASS INDEX: 25.38 KG/M2 | TEMPERATURE: 98.1 F | RESPIRATION RATE: 16 BRPM

## 2021-03-03 LAB
ALBUMIN SERPL-MCNC: 2.1 G/DL (ref 3.5–5.2)
ALBUMIN/GLOB SERPL: 0.6 G/DL
ALP SERPL-CCNC: 78 U/L (ref 39–117)
ALT SERPL W P-5'-P-CCNC: 11 U/L (ref 1–41)
ANION GAP SERPL CALCULATED.3IONS-SCNC: 6 MMOL/L (ref 5–15)
AST SERPL-CCNC: 33 U/L (ref 1–40)
BACTERIA FLD CULT: ABNORMAL
BACTERIA FLD CULT: NORMAL
BACTERIA SPEC ANAEROBE CULT: NORMAL
BASOPHILS # BLD AUTO: 0 10*3/MM3 (ref 0–0.2)
BASOPHILS NFR BLD AUTO: 0.7 % (ref 0–1.5)
BILIRUB SERPL-MCNC: 0.8 MG/DL (ref 0–1.2)
BUN SERPL-MCNC: 22 MG/DL (ref 8–23)
BUN/CREAT SERPL: 32.8 (ref 7–25)
CALCIUM SPEC-SCNC: 7.6 MG/DL (ref 8.6–10.5)
CHLORIDE SERPL-SCNC: 101 MMOL/L (ref 98–107)
CO2 SERPL-SCNC: 26 MMOL/L (ref 22–29)
CREAT SERPL-MCNC: 0.67 MG/DL (ref 0.76–1.27)
DEPRECATED RDW RBC AUTO: 51.6 FL (ref 37–54)
EOSINOPHIL # BLD AUTO: 0.5 10*3/MM3 (ref 0–0.4)
EOSINOPHIL NFR BLD AUTO: 8.7 % (ref 0.3–6.2)
ERYTHROCYTE [DISTWIDTH] IN BLOOD BY AUTOMATED COUNT: 15 % (ref 12.3–15.4)
GFR SERPL CREATININE-BSD FRML MDRD: 118 ML/MIN/1.73
GLOBULIN UR ELPH-MCNC: 3.8 GM/DL
GLUCOSE SERPL-MCNC: 77 MG/DL (ref 65–99)
GRAM STN SPEC: ABNORMAL
GRAM STN SPEC: ABNORMAL
GRAM STN SPEC: NORMAL
GRAM STN SPEC: NORMAL
HCT VFR BLD AUTO: 25.7 % (ref 37.5–51)
HGB BLD-MCNC: 9 G/DL (ref 13–17.7)
LYMPHOCYTES # BLD AUTO: 0.8 10*3/MM3 (ref 0.7–3.1)
LYMPHOCYTES NFR BLD AUTO: 15.1 % (ref 19.6–45.3)
MCH RBC QN AUTO: 34.2 PG (ref 26.6–33)
MCHC RBC AUTO-ENTMCNC: 35 G/DL (ref 31.5–35.7)
MCV RBC AUTO: 97.9 FL (ref 79–97)
MONOCYTES # BLD AUTO: 0.8 10*3/MM3 (ref 0.1–0.9)
MONOCYTES NFR BLD AUTO: 14 % (ref 5–12)
NEUTROPHILS NFR BLD AUTO: 3.4 10*3/MM3 (ref 1.7–7)
NEUTROPHILS NFR BLD AUTO: 61.5 % (ref 42.7–76)
NRBC BLD AUTO-RTO: 0.1 /100 WBC (ref 0–0.2)
PLATELET # BLD AUTO: 176 10*3/MM3 (ref 140–450)
PMV BLD AUTO: 7.5 FL (ref 6–12)
POTASSIUM SERPL-SCNC: 4.3 MMOL/L (ref 3.5–5.2)
PROT SERPL-MCNC: 5.9 G/DL (ref 6–8.5)
RBC # BLD AUTO: 2.63 10*6/MM3 (ref 4.14–5.8)
SODIUM SERPL-SCNC: 133 MMOL/L (ref 136–145)
WBC # BLD AUTO: 5.5 10*3/MM3 (ref 3.4–10.8)

## 2021-03-03 PROCEDURE — 85025 COMPLETE CBC W/AUTO DIFF WBC: CPT | Performed by: INTERNAL MEDICINE

## 2021-03-03 PROCEDURE — 71045 X-RAY EXAM CHEST 1 VIEW: CPT

## 2021-03-03 PROCEDURE — 80053 COMPREHEN METABOLIC PANEL: CPT | Performed by: INTERNAL MEDICINE

## 2021-03-03 PROCEDURE — 99239 HOSP IP/OBS DSCHRG MGMT >30: CPT | Performed by: INTERNAL MEDICINE

## 2021-03-03 RX ORDER — GAUZE BANDAGE 2" X 2"
100 BANDAGE TOPICAL DAILY
Qty: 30 TABLET | Refills: 0 | Status: ON HOLD | OUTPATIENT
Start: 2021-03-03 | End: 2021-04-14 | Stop reason: SDUPTHER

## 2021-03-03 RX ORDER — FOLIC ACID 1 MG/1
1 TABLET ORAL DAILY
Qty: 30 TABLET | Refills: 0 | Status: SHIPPED | OUTPATIENT
Start: 2021-03-04 | End: 2021-04-03

## 2021-03-03 RX ORDER — LACTULOSE 10 G/15ML
20 SOLUTION ORAL 2 TIMES DAILY
Qty: 1800 ML | Refills: 0 | Status: SHIPPED | OUTPATIENT
Start: 2021-03-03 | End: 2021-04-13

## 2021-03-03 RX ORDER — TORSEMIDE 20 MG/1
20 TABLET ORAL DAILY
Qty: 30 TABLET | Refills: 0 | Status: ON HOLD | OUTPATIENT
Start: 2021-03-04 | End: 2021-04-14

## 2021-03-03 RX ORDER — DOXYCYCLINE HYCLATE 100 MG
100 TABLET ORAL 2 TIMES DAILY
Qty: 6 TABLET | Refills: 0 | Status: SHIPPED | OUTPATIENT
Start: 2021-03-03 | End: 2021-03-06

## 2021-03-03 RX ORDER — SPIRONOLACTONE 25 MG/1
75 TABLET ORAL DAILY
Qty: 90 TABLET | Refills: 0 | Status: ON HOLD | OUTPATIENT
Start: 2021-03-04 | End: 2021-04-14

## 2021-03-03 RX ORDER — MIDODRINE HYDROCHLORIDE 2.5 MG/1
2.5 TABLET ORAL
Qty: 90 TABLET | Refills: 0 | Status: ON HOLD | OUTPATIENT
Start: 2021-03-03 | End: 2021-04-14

## 2021-03-03 RX ADMIN — TAMSULOSIN HYDROCHLORIDE 0.4 MG: 0.4 CAPSULE ORAL at 09:03

## 2021-03-03 RX ADMIN — Medication 500 MG: at 09:02

## 2021-03-03 RX ADMIN — SPIRONOLACTONE 75 MG: 25 TABLET ORAL at 09:01

## 2021-03-03 RX ADMIN — FAMOTIDINE 40 MG: 20 TABLET, FILM COATED ORAL at 09:01

## 2021-03-03 RX ADMIN — FOLIC ACID 1 MG: 1 TABLET ORAL at 09:02

## 2021-03-03 RX ADMIN — DOCUSATE SODIUM 100 MG: 100 CAPSULE, LIQUID FILLED ORAL at 09:00

## 2021-03-03 RX ADMIN — Medication 10 ML: at 09:04

## 2021-03-03 RX ADMIN — TORSEMIDE 20 MG: 10 TABLET ORAL at 09:09

## 2021-03-03 NOTE — DISCHARGE SUMMARY
Date of Admission: 2/25/2021  223/1    Date of Discharge:  3/3/2021    Length of stay:  LOS: 5 days     Patient was examined with relevant and adequate PPE keeping in mind the current coronavirus pandemic. Minimum of 10 minutes to don and doff PPE.      Presenting Problem/History of Present Illness   Present on Admission:  • Pleural effusion on left  • Ascites due to alcoholic cirrhosis (CMS/HCC)  • Iron deficiency anemia  • Moderate protein-calorie malnutrition (CMS/HCC)        Hospital Course  Chief complaint:  Chief Complaint   Patient presents with   • Shortness of Breath     SOA,ascities, swelling to legs, for 2 day pt also fatigued       Bereket Mckay 66 y.o. male.    PCP  Aditi Mcfarland APRN (General)    Mr. Mckay is a 66 y.o.  with history of liver cirrhosis due to alcohol abuse, abdominal ascites who presents today with his daughter at the bedside with complaints of shortness of breath.  Daughter at the bedside provides most of the information.  She states the patient was admitted to the hospital 1 month ago after a fall that resulted in hip fracture.  She states at that time he had his fracture repaired but was also incidentally found to have findings of liver cirrhosis and abdominal ascites.  Patient states he has had increase in shortness of breath over the last 2 days worse with exertion.  He also reports an increase in his lower leg swelling right greater than left and also an increase in his abdominal swelling.  He denies leg pain.  He states he did have some pain in his abdomen a couple days ago but denies any pain currently.  He denies any nausea vomiting diarrhea, hematemesis black or bloody stool.  He denies any chest pain.  He denies any fever chills.  He does report a mild nonproductive cough.  He denies any urinary complaint.  Patient states he has had no alcohol since his last admission in January 2021.  States he has not yet followed up with gastroenterology.         Patient admitted for  worsening shortness of breath and ascites.  Moderate pleural effusion was discovered on the left side.  He has cirrhosis.  Was only on daily Lasix prior to admission.  He was seen in consult by pulmonology and thoracentesis performed on the left side with chest tube placement.  Chest tube was removed yesterday.  Pneumothorax resolved mostly.  Most likely the pleural effusion will reaccumulate.  This needs to be managed medically along with ascites.  He does not tolerate diuretics very well.  I have added torsemide and spironolactone and ProAmatine.  We will need to follow with gastroenterology as an outpatient.  Explained all this to the daughter at bedside.  They refused rehab placement.  He grew staph epi from the pleural fluid.  That appears to be contaminant.  I will finish the course with doxycycline.      ROS  Constitution: Negative.   HENT: Negative.    Eyes: Negative.    Cardiovascular: Negative.    Respiratory:        Shortness of breath   Endocrine: Negative.    Skin: Negative.    Musculoskeletal: Negative.    Gastrointestinal: Negative.    Genitourinary: Negative.    Neurological: Negative.    Psychiatric/Behavioral: Negative.    Allergic/Immunologic: Negative.    All other systems reviewed and are negative.  Noted      Family History   Family history unknown: Yes        Past Medical History:   Diagnosis Date   • Ascites 02/25/2021   • Dyspnea 02/25/2021       Past Surgical History:   Procedure Laterality Date   • TOTAL HIP ARTHROPLASTY Right 1/12/2021    Procedure: TOTAL HIP ARTHROPLASTY ANTERIOR WITH HANA TABLE;  Surgeon: Bharath Mercado II, MD;  Location: [facility] MAIN OR;  Service: Orthopedics;  Laterality: Right;       Social History     Socioeconomic History   • Marital status: Single     Spouse name: Not on file   • Number of children: Not on file   • Years of education: Not on file   • Highest education level: Not on file   Tobacco Use   • Smoking status: Former Smoker     Quit date: 2010        Years since quittin.1   • Smokeless tobacco: Current User     Types: Chew   Substance and Sexual Activity   • Alcohol use: Not Currently     Alcohol/week: 5.0 standard drinks     Types: 5 Cans of beer per week     Comment: drinks 5 beers every     • Drug use: Never   • Sexual activity: Defer   Social History Narrative    Lives by himself       Vital Signs  Temp:  [97.6 °F (36.4 °C)-98.1 °F (36.7 °C)] 98.1 °F (36.7 °C)  Heart Rate:  [66-75] 66  Resp:  [16-18] 16  BP: ()/(57-61) 93/58  Weight change:     Physical Exam:  Physical Exam  Vitals signs and nursing note reviewed.   Constitutional:       General: He is not in acute distress.     Appearance: Normal appearance. He is well-developed. He is not ill-appearing, toxic-appearing or diaphoretic.   HENT:      Head: Normocephalic and atraumatic.      Right Ear: Ear canal and external ear normal.      Left Ear: Ear canal and external ear normal.      Nose: Nose normal. No congestion or rhinorrhea.      Mouth/Throat:      Mouth: Mucous membranes are moist.      Pharynx: No oropharyngeal exudate.   Eyes:      General: No scleral icterus.        Right eye: No discharge.         Left eye: No discharge.      Extraocular Movements: Extraocular movements intact.      Conjunctiva/sclera: Conjunctivae normal.      Pupils: Pupils are equal, round, and reactive to light.   Neck:      Musculoskeletal: Normal range of motion and neck supple. No neck rigidity or muscular tenderness.      Thyroid: No thyromegaly.      Vascular: No carotid bruit or JVD.      Trachea: No tracheal deviation.   Cardiovascular:      Rate and Rhythm: Normal rate and regular rhythm.      Heart sounds: Normal heart sounds. No murmur. No friction rub. No gallop.       Comments: Pedal pulses absent  Pulmonary:      Effort: Pulmonary effort is normal. No respiratory distress.      Breath sounds: No stridor. No wheezing, rhonchi or rales.      Comments: Absent breath sounds lower half left  hemithorax posterior fields.    Left chest tube removed now.  Chest:      Chest wall: No tenderness.   Abdominal:      General: Bowel sounds are normal. There is distension.      Palpations: Abdomen is soft. There is no mass.      Tenderness: There is no abdominal tenderness. There is no guarding or rebound.      Hernia: No hernia is present.      Comments: Ascites moderate present   Musculoskeletal: Normal range of motion.         General: No swelling, tenderness, deformity or signs of injury.      Right lower leg: Edema present.      Left lower leg: Edema present.   Lymphadenopathy:      Cervical: No cervical adenopathy.   Skin:     General: Skin is warm and dry.      Coloration: Skin is not jaundiced or pale.      Findings: No bruising, erythema or rash.   Neurological:      General: No focal deficit present.      Mental Status: He is alert and oriented to person, place, and time. Mental status is at baseline.      Cranial Nerves: No cranial nerve deficit.      Sensory: No sensory deficit.      Motor: No weakness or abnormal muscle tone.      Coordination: Coordination normal.      Comments: Asterixis absent   Psychiatric:         Mood and Affect: Mood normal.         Behavior: Behavior normal.         Thought Content: Thought content normal.         Judgment: Judgment normal.             Discharge Diagnosis:     Active Hospital Problems    Diagnosis  POA   • **Ascites due to alcoholic cirrhosis (CMS/HCC) [K70.31]  Yes     Priority: High   • Postprocedural pneumothorax [J95.811]  No   • Iron deficiency anemia [D50.9]  Yes   • Moderate protein-calorie malnutrition (CMS/HCC) [E44.0]  Yes   • Pleural effusion on left [J90]  Yes      Resolved Hospital Problems   No resolved problems to display.       Estimated Creatinine Clearance: 107.7 mL/min (A) (by C-G formula based on SCr of 0.67 mg/dL (L)).    Discharge Disposition    Continued Care and Services - Admitted Since 2/25/2021     Home Medical Care     Service  Provider Request Status Selected Services Address Phone Fax Patient Preferred    [facility]  Accepted N/A [address] [place] [address] [phone] --            Selected Continued Care - Prior Encounters Includes selections from prior encounters from 11/27/2020 to 3/3/2021    Discharged on 1/15/2021 Admission date: 1/10/2021 - Discharge disposition: Home-Health Care [facility]    Home Medical Care     Service Provider Selected Services Address Phone Fax Patient Preferred    [facility] [address] [place] [address] [phone] --                          PT Recommendation and Plan          Home-Health Care [facility]           Discharge Medications      New Medications      Instructions Start Date   doxycycline 100 MG enteric coated tablet  Commonly known as: DORYX   100 mg, Oral, 2 Times Daily      folic acid 1 MG tablet  Commonly known as: FOLVITE   1 mg, Oral, Daily   Start Date: March 4, 2021     lactulose 10 GM/15ML solution  Commonly known as: CHRONULAC   20 g, Oral, 2 Times Daily, Goal of 2 soft bowel movements per day      midodrine 2.5 MG tablet  Commonly known as: PROAMATINE   2.5 mg, Oral, 3 Times Daily Before Meals      spironolactone 25 MG tablet  Commonly known as: ALDACTONE   75 mg, Oral, Daily   Start Date: March 4, 2021     thiamine 100 MG tablet tablet  Commonly known as: VITAMIN B-1   100 mg, Oral, Daily      torsemide 20 MG tablet  Commonly known as: DEMADEX   20 mg, Oral, Daily   Start Date: March 4, 2021        Continue These Medications      Instructions Start Date   HYDROcodone-acetaminophen 5-325 MG per tablet  Commonly known as: NORCO   1 tablet, Oral, Every 6 Hours PRN      tamsulosin 0.4 MG capsule 24 hr capsule  Commonly known as: FLOMAX   0.4 mg, Oral, Daily         Stop These Medications    furosemide 40 MG tablet  Commonly known as: LASIX     potassium chloride 10 MEQ CR tablet           Discharge medications personally reviewed by me and med rec done by me personally.  03/03/21, 9:41 AM EST        Consults:   Consults     Date and Time Order Name Status Description    2/25/2021 1404 Inpatient Pulmonology Consult Completed     2/25/2021 1327 Hospitalist (on-call MD unless specified) Completed           Procedures Performed:    * No surgery found *        Pertinent Test Results:   Results from last 7 days   Lab Units 03/03/21 0418 03/02/21  0250 03/01/21  0324   WBC 10*3/mm3 5.50 5.60 5.60   HEMOGLOBIN g/dL 9.0* 8.9* 8.9*   HEMATOCRIT % 25.7* 25.6* 26.0*   MCV fL 97.9* 98.1* 98.6*   MCH pg 34.2* 34.0* 33.7*   PLATELETS 10*3/mm3 176 179 176     Results from last 7 days   Lab Units 03/03/21 0418 03/02/21  0250 03/01/21  0324  02/25/21  1054   SODIUM mmol/L 133* 134* 135*   < > 136   POTASSIUM mmol/L 4.3 4.1 4.0   < > 3.9   CHLORIDE mmol/L 101 101 103   < > 102   CO2 mmol/L 26.0 25.0 25.0   < > 25.0   BUN mg/dL 22 19 15   < > 10   CREATININE mg/dL 0.67* 0.57* 0.55*   < > 0.66*   CALCIUM mg/dL 7.6* 7.5* 8.0*   < > 8.1*   MAGNESIUM mg/dL  --   --   --   --  1.9   BILIRUBIN mg/dL 0.8 0.8 1.0   < > 1.9*   ALK PHOS U/L 78 83 88   < > 99   ALT (SGPT) U/L 11 10 11   < > 13   AST (SGOT) U/L 33 33 30   < > 43*   GLUCOSE mg/dL 77 75 77   < > 92    < > = values in this interval not displayed.     Lab Results   Component Value Date    CALCIUM 7.6 (L) 03/03/2021     No results found for: HGBA1C  No results found for: CHOL, CHLPL, TRIG, HDL, LDL, LDLDIRECT  No results found for: LIPASE  Results from last 7 days   Lab Units 02/25/21  1431   PH, ARTERIAL pH units 7.469*   PO2 ART mm Hg 82.6*   PCO2, ARTERIAL mm Hg 34.1*   HCO3 ART mmol/L 24.8     Pathology  Lab Results   Lab Value Date/Time    FINALDX  02/26/2021 1338     Peritoneal fluid, smears and cytospin preparation:    Benign mesothelial cells, histiocytes and acute and chronic inflammatory cells    Negative for malignant cells    JPR/sms         Inflammatory Biomarkers  Results from last 7 days   Lab Units 02/27/21  0615   FERRITIN ng/mL 184.50     COVID19   Date Value Ref Range Status   02/25/2021 Not Detected Not Detected - Ref. Range Final        Microbiology Results (last 10 days)     Procedure Component Value - Date/Time    Anaerobic Culture - Pleural Fluid, Pleural Cavity [[id number]] Collected: 02/27/21 1227    Lab Status: Preliminary result Specimen: Pleural Fluid from Pleural Cavity Updated: 03/03/21 0656     Anaerobic Culture No anaerobes isolated at 4 days    Body Fluid Culture - Body Fluid, Pleural Cavity [[id number]]  (Abnormal) Collected: 02/27/21 1227    Lab Status: Final result Specimen: Body Fluid from Pleural Cavity Updated: 03/03/21 0710     Body Fluid Culture Staphylococcus, coagulase negative     Comment: In broth only.    Probable contaminant requires clinical correlation, susceptibility not performed unless requested by physician.          Gram Stain Many (4+) WBCs per low power field      No organisms seen    AFB Culture - Body Fluid, Pleural Cavity [[id number]] Collected: 02/27/21 1227    Lab Status: Preliminary result Specimen: Body Fluid from Pleural Cavity Updated: 02/28/21 1110     AFB Stain No acid fast bacilli seen    Body Fluid Culture - Body Fluid, Peritoneum [[id number]] Collected: 02/26/21 1339    Lab Status: Final result Specimen: Body Fluid from Peritoneum Updated: 03/03/21 0656     Body Fluid Culture No growth at 5 days     Gram Stain Rare (1+) WBCs seen      No organisms seen    Anaerobic Culture - Body Fluid, Peritoneum [[id number]] Collected: 02/26/21 1339    Lab Status: Final result Specimen: Body Fluid from Peritoneum Updated: 03/03/21 0656     Anaerobic Culture No anaerobes isolated at 5 days    Blood Culture - Blood, Arm, Left [[id number]] Collected: 02/25/21 1108    Lab Status: Final result Specimen: Blood from Arm, Left Updated: 03/02/21 1115     Blood Culture No growth at 5 days    Respiratory Panel PCR  w/COVID-19(SARS-CoV-2) JUAN/FATUMA/ELLIE/PAD/COR/MAD/CHAVO In-House, NP Swab in UTM/VTM, 3-4 HR TAT - Swab, Nasopharynx [[id number]]  (Normal) Collected: 02/25/21 1056    Lab Status: Final result Specimen: Swab from Nasopharynx Updated: 02/25/21 1201     ADENOVIRUS, PCR Not Detected     Coronavirus 229E Not Detected     Coronavirus HKU1 Not Detected     Coronavirus NL63 Not Detected     Coronavirus OC43 Not Detected     COVID19 Not Detected     Human Metapneumovirus Not Detected     Human Rhinovirus/Enterovirus Not Detected     Influenza A PCR Not Detected     Influenza B PCR Not Detected     Parainfluenza Virus 1 Not Detected     Parainfluenza Virus 2 Not Detected     Parainfluenza Virus 3 Not Detected     Parainfluenza Virus 4 Not Detected     RSV, PCR Not Detected     Bordetella pertussis pcr Not Detected     Bordetella parapertussis PCR Not Detected     Chlamydophila pneumoniae PCR Not Detected     Mycoplasma pneumo by PCR Not Detected    Narrative:      Fact sheet for providers: https://docs.[employer] [facility]/wp-content/uploads/JHD2157-4666-NR0.1-EUA-Provider-Fact-Sheet-3.pdf    Fact sheet for patients: https://docs.[facility]/wp-content/uploads/OYB6938-7263-OC4.1-EUA-Patient-Fact-Sheet-1.pdf    Test performed by PCR.    Blood Culture - Blood, Arm, Right [[id number]] Collected: 02/25/21 1054    Lab Status: Final result Specimen: Blood from Arm, Right Updated: 03/02/21 1115     Blood Culture No growth at 5 days          ECG/EMG Results (most recent)     Procedure Component Value Units Date/Time    ECG 12 Lead [[id number]] Collected: 02/25/21 1046     Updated: 02/28/21 1320     QT Interval 419 ms     Narrative:      HEART RATE= 70  bpm  RR Interval= 856  ms  AR Interval= 188  ms  P Horizontal Axis= 41  deg  P Front Axis= 12  deg  QRSD Interval= 100  ms  QT Interval= 419  ms  QRS Axis= -4  deg  T Wave Axis= -1  deg  - OTHERWISE NORMAL ECG -  Sinus rhythm  Low voltage, precordial leads  When compared with ECG of 11-Jan-2021  15:25:50,  Significant rate decrease  Electronically Signed By: Phuc Giordano ([facility]) 28-Feb-2021 13:19:45  Date and Time of Study: 2021-02-25 10:46:58          Results for orders placed during the hospital encounter of 02/25/21   Duplex Venous Lower Extremity - Right CAR    Narrative · Normal right lower extremity venous duplex scan.          Results for orders placed during the hospital encounter of 01/10/21   Adult Transthoracic Echo Complete W/ Cont if Necessary Per Protocol    Narrative · Left ventricular ejection fraction appears to be 61 - 65%.  · Left ventricular diastolic function is consistent with (grade I)   impaired relaxation.  · The right ventricular cavity is mild to moderately dilated.  · Typically difficult study with limited acoustical windows; limited   M-mode measurements and limited Doppler assessment  · No specific Doppler abnormalities appreciated on limited study; normal   RV systolic pressure          Ct Abdomen Pelvis With Contrast    Result Date: 2/25/2021   1. Small lobulated cirrhotic appearing liver 2. Large amount of ascites throughout the abdomen pelvis. Unchanged 3. Cholelithiasis. Small 5 mm size calcified stone in the gallbladder lumen unchanged.. 4. Moderate-large pleural effusion posterior aspect left lower lobe along with moderate-large atelectatic consolidating infiltrate left lower lobe along unchanged since previous study  Electronically Signed By-Ezequiel Marley MD On:2/25/2021 12:38 PM This report was finalized on [id number] by  Ezequiel Marley MD.    Xr Chest 1 View    Result Date: 3/3/2021  1. Removal of the left basilar small bore chest tube with small residual 4 mm pneumothorax which has decreased from the prior exam. 2. Mild left basilar airspace opacity and a small left-sided pleural effusion.  Electronically Signed By-Destin Montenegro MD On:3/3/2021 7:51 AM This report was finalized on [id number] by  Destin Montenegro MD.    Xr Chest 1 View    Result  Date: 3/2/2021   1. Slight interval increase in the small left apical pneumothorax compared to one day prior. Left basilar pigtail drain remains in place. 2. Left basilar airspace disease favored to represent atelectasis, and small left pleural effusion, without significant change.  Electronically Signed By-Adeline Lennon MD On:3/2/2021 8:03 AM This report was finalized on [id number] by  Adeline Lennon MD.    Xr Chest 1 View    Result Date: 3/1/2021  1. Stable small left apical pneumothorax measuring 8 mm. 2. Focal kink in the tubing of the left basilar small bore chest tube. Correlate clinically for output or findings of dysfunction. 3. Left basilar airspace opacity, similar to the prior examination likely representing atelectasis.  Electronically Signed By-Destin Montenegro MD On:3/1/2021 7:47 AM This report was finalized on [id number] by  Destin Montenegro MD.    Xr Chest 1 View    Result Date: 2/28/2021    1.  No change in left thoracostomy tube and small left apical pneumothorax. 2.  No change in left basilar airspace disease.  Electronically Signed By-Ruben Urbina MD On:2/28/2021 8:37 AM This report was finalized on [id number] by  Ruben Urbina MD.    Xr Chest 1 View    Result Date: 2/27/2021  Interval placement of a left-sided chest tube with decrease in the amount of left pleural fluid. There is now a small left apical pneumothorax. The small bore thoracostomy tube does appear to be kinked.  Electronically Signed By-Phuc Diaz MD On:2/27/2021 1:14 PM This report was finalized on [id number] by  Phuc Diaz MD.    Xr Chest 1 View    Result Date: 2/27/2021  Slight decrease in the size of the left pleural effusion in the interval.  Electronically Signed By-Phuc Diaz MD On:2/27/2021 8:48 AM This report was finalized on [id number] by  Phuc Diaz MD.    Xr Chest 1 View    Result Date: 2/26/2021  Worse appearance since previous study. Large left pleural effusion. Increased opacity in the  left upper lobe and persistent consolidation in the left lower lobe.  Electronically Signed By-Ivanna Mays MD On:2/26/2021 7:23 AM This report was finalized on [id number] by  Ivanna Mays MD.    Xr Chest 1 View    Result Date: 2/25/2021  Stable appearance of moderate sized left basilar pleural effusion with left mid to lower lung zone atelectasis or pneumonia.  Electronically Signed By-Adeline Lennon MD On:2/25/2021 11:19 AM This report was finalized on [id number] by  Adeline Lennon MD.      Xrays, labs reviewed personally by me.  03/03/21  9:41 AM EST      Condition on Discharge:    Stable    Discharge Diet:   Dietary Orders (From admission, onward)     Start     Ordered    02/25/21 1622  Diet Cardiac; Healthy Heart  Diet Effective Now     Question Answer Comment   Diet / Texture / Consistency Cardiac    Select Type: Healthy Heart        02/25/21 1621                Activity at Discharge:   Activity Instructions     Activity as Tolerated            Follow-up Appointments    No future appointments.    Additional Instructions for the Follow-ups that You Need to Schedule     Ambulatory Referral to Home Health   As directed      Face to Face Visit Date: 3/1/2021    Follow-up provider for Plan of Care?: I treated the patient in an acute care facility and will not continue treatment after discharge.    Follow-up provider: SERAFIN MCFARLAND [[id number]]    Reason/Clinical Findings: post hospital evaluation    Describe mobility limitations that make leaving home difficult: weakness    Nursing/Therapeutic Services Requested: Other ([facility] to evaluate )    Frequency: 1 Week 1         Discharge Follow-up with PCP   As directed       Currently Documented PCP:    Serafin Mcfarland APRN    PCP Phone Number:    [phone]     Follow Up Details: If no PCP, call MD finder at [phone]           Follow-up Information     Herbert Malloy MD Follow up in 2 week(s).    Specialty: Gastroenterology  Why:  Cirrhosis  Contact information:  [address] [place] [address] [phone]             Aditi Mcfarland APRN .    Specialty: Nurse Practitioner  Why: If no PCP, call MD finder at [phone]  Contact information:  [address] [address] [phone]                    Test Results Pending at Discharge  Pending Labs     Order Current Status    Extra Tubes In process    Green Top (No Gel) In process    Green Top (No Gel) In process    AFB Culture - Body Fluid, Pleural Cavity Preliminary result    Anaerobic Culture - Pleural Fluid, Pleural Cavity Preliminary result           Risk for Readmission (LACE) Score: 14 (3/3/2021  6:00 AM)        Time: I spent  more than 35 minutes on this discharge activity which included: face-to-face encounter with the patient, reviewing the data in the system, coordination of the care with the nursing staff as well as consultants, documentation, and entering orders.   Care coordination with nursing.  Counseled patient and daughter at bedside regarding discharge.        León Mckenzie MD  03/03/21  09:49 EST

## 2021-03-03 NOTE — PLAN OF CARE
Pt with anticipated hospital d/c per RN.  PT did not enter room.    Cynthia Humphreys PT, DPT, GCS

## 2021-03-03 NOTE — PROGRESS NOTES
"PULMONARY CRITICAL CARE Progress  NOTE      PATIENT IDENTIFICATION:  Name: Bereket Mckay  MRN: [id number]  :  1955     Age: 66 y.o.  Sex: male    DATE OF Note:  3/3/2021   Referring Physician: Tata Post MD                  Subjective:   No drainage from the chest tube site  Chest x-ray was reviewed  No SOB no chest pain, no nausea or vomiting, no change in bowel habit, no dysuria,  no new  skin rash or itching.      Objective:  tMax 24 hrs: Temp (24hrs), Av.1 °F (36.7 °C), Min:98.1 °F (36.7 °C), Max:98.1 °F (36.7 °C)      Vitals Ranges:   Temp:  [98.1 °F (36.7 °C)] 98.1 °F (36.7 °C)  Heart Rate:  [66-75] 66  Resp:  [16] 16  BP: ()/(57-61) 93/58    Intake and Output Last 3 Shifts:   I/O last 3 completed shifts:  In: 600 [P.O.:600]  Out: 2950 [Urine:2600; Drains:350]    Exam:  BP 93/58   Pulse 66   Temp 98.1 °F (36.7 °C) (Oral)   Resp 16   Ht 182.9 cm (72\")   Wt 85 kg (187 lb 6.3 oz)   SpO2 94%   BMI 25.41 kg/m²     General Appearance:   Alert awake not in distress  HEENT:  Normocephalic, without obvious abnormality, Conjunctiva/corneas clear,.  Normal external ear canals, Nares normal, no drainage     Neck:  Supple, symmetrical, trachea midline. No JVD.  Lungs /Chest wall: Clean dressing, respirations unlabored symmetrical wall movement.     Heart:  Regular rate and rhythm, systolic murmur PMI left sternal border  Abdomen: Soft, non-tender, no masses, no organomegaly.    Extremities: Trace edema no clubbing or Cyanosis        Medications:  No current facility-administered medications for this encounter.     Current Outpatient Medications:   •  HYDROcodone-acetaminophen (NORCO) 5-325 MG per tablet, Take 1 tablet by mouth Every 6 (Six) Hours As Needed., Disp: , Rfl:   •  tamsulosin (FLOMAX) 0.4 MG capsule 24 hr capsule, Take 1 capsule by mouth Daily., Disp: 30 capsule, Rfl: 0  •  doxycycline (VIBRAMYICN) 100 MG tablet, Take 1 tablet by mouth 2 (Two) Times a Day for 3 days., Disp: 6 " tablet, Rfl: 0  •  [START ON 3/4/2021] folic acid (FOLVITE) 1 MG tablet, Take 1 tablet by mouth Daily for 30 days., Disp: 30 tablet, Rfl: 0  •  lactulose (CHRONULAC) 10 GM/15ML solution, Take 30 mL by mouth 2 (Two) Times a Day for 30 days. Goal of 2 soft bowel movements per day, Disp: 1800 mL, Rfl: 0  •  midodrine (PROAMATINE) 2.5 MG tablet, Take 1 tablet by mouth 3 (Three) Times a Day Before Meals for 30 days., Disp: 90 tablet, Rfl: 0  •  [START ON 3/4/2021] spironolactone (ALDACTONE) 25 MG tablet, Take 3 tablets by mouth Daily for 30 days., Disp: 90 tablet, Rfl: 0  •  Thiamine Mononitrate 100 MG tablet, Take 1 tablet by mouth Daily., Disp: 30 tablet, Rfl: 0  •  [START ON 3/4/2021] torsemide (DEMADEX) 20 MG tablet, Take 1 tablet by mouth Daily for 30 days., Disp: 30 tablet, Rfl: 0    Data Review:  All labs (24hrs):   Recent Results (from the past 24 hour(s))   Comprehensive Metabolic Panel    Collection Time: 03/03/21  4:18 AM    Specimen: Blood   Result Value Ref Range    Glucose 77 65 - 99 mg/dL    BUN 22 8 - 23 mg/dL    Creatinine 0.67 (L) 0.76 - 1.27 mg/dL    Sodium 133 (L) 136 - 145 mmol/L    Potassium 4.3 3.5 - 5.2 mmol/L    Chloride 101 98 - 107 mmol/L    CO2 26.0 22.0 - 29.0 mmol/L    Calcium 7.6 (L) 8.6 - 10.5 mg/dL    Total Protein 5.9 (L) 6.0 - 8.5 g/dL    Albumin 2.10 (L) 3.50 - 5.20 g/dL    ALT (SGPT) 11 1 - 41 U/L    AST (SGOT) 33 1 - 40 U/L    Alkaline Phosphatase 78 39 - 117 U/L    Total Bilirubin 0.8 0.0 - 1.2 mg/dL    eGFR Non African Amer 118 >60 mL/min/1.73    Globulin 3.8 gm/dL    A/G Ratio 0.6 g/dL    BUN/Creatinine Ratio 32.8 (H) 7.0 - 25.0    Anion Gap 6.0 5.0 - 15.0 mmol/L   CBC Auto Differential    Collection Time: 03/03/21  4:18 AM    Specimen: Blood   Result Value Ref Range    WBC 5.50 3.40 - 10.80 10*3/mm3    RBC 2.63 (L) 4.14 - 5.80 10*6/mm3    Hemoglobin 9.0 (L) 13.0 - 17.7 g/dL    Hematocrit 25.7 (L) 37.5 - 51.0 %    MCV 97.9 (H) 79.0 - 97.0 fL    MCH 34.2 (H) 26.6 - 33.0 pg    MCHC  35.0 31.5 - 35.7 g/dL    RDW 15.0 12.3 - 15.4 %    RDW-SD 51.6 37.0 - 54.0 fl    MPV 7.5 6.0 - 12.0 fL    Platelets 176 140 - 450 10*3/mm3    Neutrophil % 61.5 42.7 - 76.0 %    Lymphocyte % 15.1 (L) 19.6 - 45.3 %    Monocyte % 14.0 (H) 5.0 - 12.0 %    Eosinophil % 8.7 (H) 0.3 - 6.2 %    Basophil % 0.7 0.0 - 1.5 %    Neutrophils, Absolute 3.40 1.70 - 7.00 10*3/mm3    Lymphocytes, Absolute 0.80 0.70 - 3.10 10*3/mm3    Monocytes, Absolute 0.80 0.10 - 0.90 10*3/mm3    Eosinophils, Absolute 0.50 (H) 0.00 - 0.40 10*3/mm3    Basophils, Absolute 0.00 0.00 - 0.20 10*3/mm3    nRBC 0.1 0.0 - 0.2 /100 WBC        Imaging:  XR Chest 1 View  Narrative: EXAMINATION: XR CHEST 1 VW-     DATE OF EXAM: 3/3/2021 4:51 AM     INDICATION: thoracentesis post; R06.00-Dyspnea, unspecified;  K70.31-Alcoholic cirrhosis of liver with ascites; U37-Stbdryj effusion,  not elsewhere classified; D64.9-Anemia, unspecified.     COMPARISON: Chest radiograph dated 3/2/2021     TECHNIQUE: Portable AP view of the chest was obtained.     FINDINGS:  The left basilar small bore chest tube has been removed. There is a  small left-sided pneumothorax measuring 4 mm, decreased from the prior  examination. There is left basilar airspace opacity and a trace  left-sided pleural effusion. There is no right-sided airspace disease,  effusion, or pneumothorax. There is a calcified subcarinal lymph node  compatible with underlying granulomatous disease.     Impression: 1. Removal of the left basilar small bore chest tube with small residual  4 mm pneumothorax which has decreased from the prior exam.  2. Mild left basilar airspace opacity and a small left-sided pleural  effusion.     Electronically Signed By-Destin Montenegro MD On:3/3/2021 7:51 AM  This report was finalized on [id number] by  Destin Montenegro MD.       ASSESSMENT:  Ascites due to alcoholic cirrhosis (CMS/HCC)    Pleural effusion on left    Iron deficiency anemia    Moderate protein-calorie malnutrition  (CMS/[facility])    Postprocedural pneumothorax       PLAN:  Okay to DC follow-up as outpatient   Bronchodilator  Inhaled corticosteroids  Electrolytes/ glycemic control  DVT and GI prophylaxis.    Total Critical care time in direct medical management (   ) minutes  Segundo Cobb MD. D, ABSM.     3/3/2021  17:19 EST

## 2021-03-03 NOTE — PLAN OF CARE
Goal Outcome Evaluation:  Plan of Care Reviewed With: patient, daughter  Progress: improving  Outcome Summary: Pt is resting in bed comfortably. Pt shows no s/s of distress and vitals are stable. Chest tube was removed from pt and pt tolerated procedure well, dressing is intact. Doctor ordered chest xray for 3/3. Call light within reach. Will continue to monitor.

## 2021-03-04 ENCOUNTER — READMISSION MANAGEMENT (OUTPATIENT)
Dept: CALL CENTER | Facility: HOSPITAL | Age: 66
End: 2021-03-04

## 2021-03-04 LAB — BACTERIA SPEC ANAEROBE CULT: NORMAL

## 2021-03-04 NOTE — OUTREACH NOTE
Prep Survey      Responses   [facility]?  [facility]   Is LACE score < 7 ?  No   Emergency Room discharge w/ pulse ox?  No   Eligibility  Readm Mgmt   Discharge diagnosis  Left pleural effusion, abdominal ascites, shortness of breath   Does the patient have one of the following disease processes/diagnoses(primary or secondary)?  Other   Does the patient have Home health ordered?  Yes   What is the Home health agency?   [facility]   Is there a DME ordered?  No   Comments regarding appointments  Needs f/u scheduled   Medication alerts for this patient  Per AVS   Prep survey completed?  Yes          Marti Green RN

## 2021-03-04 NOTE — PROGRESS NOTES
Case Management Discharge Note      Final Note: [facility] - notified of DC.    Provided Post Acute Provider List?: Yes  Post Acute Provider List: Home Health  Delivered To: Patient, Support Person  Support Person: daughter  Method of Delivery: In person        Home Medical Care Coordination complete    Service Provider Selected Services Address Phone Fax Patient Preferred    [facility] [place] [address] [phone] --                Selected Continued Care - Prior Encounters Includes selections from prior encounters from 11/27/2020 to 3/3/2021    Discharged on 1/15/2021 Admission date: 1/10/2021 - Discharge disposition: Home-Health Care Svc    Home Medical Care     Service Provider Selected Services Address Phone Fax Patient Preferred    [facility] [address] [place] [address] [place] [address] [phone] --                         Final Discharge Disposition Code: 06 - home with home health care

## 2021-03-08 ENCOUNTER — READMISSION MANAGEMENT (OUTPATIENT)
Dept: CALL CENTER | Facility: HOSPITAL | Age: 66
End: 2021-03-08

## 2021-03-08 NOTE — OUTREACH NOTE
Medical Week 1 Survey      Responses   [facility] patient discharged from?  [facility]   Does the patient have one of the following disease processes/diagnoses(primary or secondary)?  Other   Week 1 attempt successful?  Yes   Call start time  1519   Call end time  1521   Discharge diagnosis  Left pleural effusion, abdominal ascites, shortness of breath   Is patient permission given to speak with other caregiver?  Yes   List who call center can speak with  Suma daughter   Person spoke with today (if not patient) and relationship  Suma   Meds reviewed with patient/caregiver?  Yes   Is the patient having any side effects they believe may be caused by any medication additions or changes?  No   Does the patient have all medications ordered at discharge?  Yes   Is the patient taking all medications as directed (includes completed medication regime)?  Yes   Does the patient have a primary care provider?   Yes   Does the patient have an appointment with their PCP within 7 days of discharge?  No   What is preventing the patient from scheduling follow up appointments within 7 days of discharge?  Haven't had time   Nursing Interventions  Educated patient on importance of making appointment   Comments  Daughter will call tomorrow and arrange his APPTS   What is the Home health agency?   [facility]   Has home health visited the patient within 72 hours of discharge?  Yes   Psychosocial issues?  No   Did the patient receive a copy of their discharge instructions?  Yes   Nursing interventions  Reviewed instructions with patient   What is the patient's perception of their health status since discharge?  Same   Is the patient/caregiver able to teach back signs and symptoms related to disease process for when to call PCP?  Yes   Is the patient/caregiver able to teach back signs and symptoms related to disease process for when to call 911?  Yes   Is the patient/caregiver able to teach back the hierarchy of who  to call/visit for symptoms/problems? PCP, Specialist, Home health nurse, Urgent Care, ED, 911  Yes   If the patient is a current smoker, are they able to teach back resources for cessation?  [id number]   Additional teach back comments  non smoker, chews tobacco   Week 1 call completed?  Yes   Wrap up additional comments  Spoke with daughterSuma per pt permission.  Fluid started building up on abdomen before he left hospital.  Breathing stable for now.  He has medications and pain med if needed.  She will call tomorrow to arrange his follow up appts.            Camryn Gutierrez RN

## 2021-03-16 ENCOUNTER — READMISSION MANAGEMENT (OUTPATIENT)
Dept: CALL CENTER | Facility: HOSPITAL | Age: 66
End: 2021-03-16

## 2021-03-18 ENCOUNTER — READMISSION MANAGEMENT (OUTPATIENT)
Dept: CALL CENTER | Facility: HOSPITAL | Age: 66
End: 2021-03-18

## 2021-03-18 NOTE — OUTREACH NOTE
Medical Week 2 Survey      Responses   [facility] patient discharged from?  [facility]   Does the patient have one of the following disease processes/diagnoses(primary or secondary)?  Other   Week 2 attempt successful?  No   Rescheduled  Revoked [Rescheduled last call, no answer today. ]          Aditi Steele RN

## 2021-04-10 LAB
MYCOBACTERIUM SPEC CULT: NORMAL
NIGHT BLUE STAIN TISS: NORMAL

## 2021-04-13 ENCOUNTER — APPOINTMENT (OUTPATIENT)
Dept: CT IMAGING | Facility: HOSPITAL | Age: 66
End: 2021-04-13

## 2021-04-13 ENCOUNTER — HOSPITAL ENCOUNTER (OUTPATIENT)
Facility: HOSPITAL | Age: 66
Setting detail: OBSERVATION
Discharge: HOME | End: 2021-04-14
Attending: EMERGENCY MEDICINE | Admitting: INTERNAL MEDICINE

## 2021-04-13 ENCOUNTER — APPOINTMENT (OUTPATIENT)
Dept: GENERAL RADIOLOGY | Facility: HOSPITAL | Age: 66
End: 2021-04-13

## 2021-04-13 DIAGNOSIS — J69.0 ASPIRATION PNEUMONITIS (HCC): Primary | ICD-10-CM

## 2021-04-13 DIAGNOSIS — R41.82 ALTERED MENTAL STATUS, UNSPECIFIED ALTERED MENTAL STATUS TYPE: ICD-10-CM

## 2021-04-13 DIAGNOSIS — K70.30 ALCOHOLIC CIRRHOSIS, UNSPECIFIED WHETHER ASCITES PRESENT (HCC): ICD-10-CM

## 2021-04-13 PROBLEM — S72.009A CLOSED FRACTURE OF HIP (HCC): Status: RESOLVED | Noted: 2021-01-10 | Resolved: 2021-04-13

## 2021-04-13 PROBLEM — I95.9 HYPOTENSION: Status: ACTIVE | Noted: 2021-04-13

## 2021-04-13 PROBLEM — E72.20 HYPERAMMONEMIA (HCC): Status: ACTIVE | Noted: 2021-04-13

## 2021-04-13 PROBLEM — F10.11 HISTORY OF ALCOHOL ABUSE: Status: ACTIVE | Noted: 2021-04-13

## 2021-04-13 PROBLEM — J95.811 POSTPROCEDURAL PNEUMOTHORAX: Status: RESOLVED | Noted: 2021-02-28 | Resolved: 2021-04-13

## 2021-04-13 PROBLEM — J90 PLEURAL EFFUSION ON LEFT: Status: RESOLVED | Noted: 2021-02-25 | Resolved: 2021-04-13

## 2021-04-13 PROBLEM — D68.9 COAGULOPATHY (HCC): Status: ACTIVE | Noted: 2021-04-13

## 2021-04-13 PROBLEM — K70.31 ASCITES DUE TO ALCOHOLIC CIRRHOSIS (HCC): Status: ACTIVE | Noted: 2021-02-25

## 2021-04-13 PROBLEM — K76.82 ACUTE HEPATIC ENCEPHALOPATHY (HCC): Status: ACTIVE | Noted: 2021-04-13

## 2021-04-13 LAB
ALBUMIN SERPL-MCNC: 2.8 G/DL (ref 3.5–5.2)
ALBUMIN/GLOB SERPL: 0.7 G/DL
ALP SERPL-CCNC: 85 U/L (ref 39–117)
ALT SERPL W P-5'-P-CCNC: 21 U/L (ref 1–41)
AMMONIA BLD-SCNC: 110 UMOL/L (ref 16–60)
AMPHET+METHAMPHET UR QL: NEGATIVE
ANION GAP SERPL CALCULATED.3IONS-SCNC: 13 MMOL/L (ref 5–15)
APAP SERPL-MCNC: <5 MCG/ML (ref 0–30)
APTT PPP: 33.6 SECONDS (ref 24–31)
AST SERPL-CCNC: 41 U/L (ref 1–40)
B PARAPERT DNA SPEC QL NAA+PROBE: NOT DETECTED
B PERT DNA SPEC QL NAA+PROBE: NOT DETECTED
BACTERIA UR QL AUTO: ABNORMAL /HPF
BARBITURATES UR QL SCN: NEGATIVE
BASOPHILS # BLD AUTO: 0.1 10*3/MM3 (ref 0–0.2)
BASOPHILS NFR BLD AUTO: 0.9 % (ref 0–1.5)
BENZODIAZ UR QL SCN: NEGATIVE
BILIRUB SERPL-MCNC: 1.8 MG/DL (ref 0–1.2)
BILIRUB UR QL STRIP: NEGATIVE
BUN SERPL-MCNC: 22 MG/DL (ref 8–23)
BUN/CREAT SERPL: 27.8 (ref 7–25)
C PNEUM DNA NPH QL NAA+NON-PROBE: NOT DETECTED
CALCIUM SPEC-SCNC: 9 MG/DL (ref 8.6–10.5)
CANNABINOIDS SERPL QL: NEGATIVE
CHLORIDE SERPL-SCNC: 102 MMOL/L (ref 98–107)
CLARITY UR: CLEAR
CO2 SERPL-SCNC: 20 MMOL/L (ref 22–29)
COCAINE UR QL: NEGATIVE
COLOR UR: YELLOW
CREAT SERPL-MCNC: 0.79 MG/DL (ref 0.76–1.27)
D-LACTATE SERPL-SCNC: 1.1 MMOL/L (ref 0.5–2)
DEPRECATED RDW RBC AUTO: 61.3 FL (ref 37–54)
EOSINOPHIL # BLD AUTO: 0.1 10*3/MM3 (ref 0–0.4)
EOSINOPHIL NFR BLD AUTO: 0.8 % (ref 0.3–6.2)
ERYTHROCYTE [DISTWIDTH] IN BLOOD BY AUTOMATED COUNT: 18.3 % (ref 12.3–15.4)
ETHANOL UR QL: <0.01 %
FLUAV SUBTYP SPEC NAA+PROBE: NOT DETECTED
FLUBV RNA ISLT QL NAA+PROBE: NOT DETECTED
GFR SERPL CREATININE-BSD FRML MDRD: 98 ML/MIN/1.73
GLOBULIN UR ELPH-MCNC: 3.8 GM/DL
GLUCOSE SERPL-MCNC: 106 MG/DL (ref 65–99)
GLUCOSE UR STRIP-MCNC: NEGATIVE MG/DL
HADV DNA SPEC NAA+PROBE: NOT DETECTED
HCOV 229E RNA SPEC QL NAA+PROBE: NOT DETECTED
HCOV HKU1 RNA SPEC QL NAA+PROBE: NOT DETECTED
HCOV NL63 RNA SPEC QL NAA+PROBE: NOT DETECTED
HCOV OC43 RNA SPEC QL NAA+PROBE: NOT DETECTED
HCT VFR BLD AUTO: 32 % (ref 37.5–51)
HGB BLD-MCNC: 11 G/DL (ref 13–17.7)
HGB UR QL STRIP.AUTO: ABNORMAL
HMPV RNA NPH QL NAA+NON-PROBE: NOT DETECTED
HOLD SPECIMEN: NORMAL
HPIV1 RNA SPEC QL NAA+PROBE: NOT DETECTED
HPIV2 RNA SPEC QL NAA+PROBE: NOT DETECTED
HPIV3 RNA NPH QL NAA+PROBE: NOT DETECTED
HPIV4 P GENE NPH QL NAA+PROBE: NOT DETECTED
HYALINE CASTS UR QL AUTO: ABNORMAL /LPF
INR PPP: 1.44 (ref 0.93–1.1)
KETONES UR QL STRIP: NEGATIVE
L PNEUMO1 AG UR QL IA: NEGATIVE
LEUKOCYTE ESTERASE UR QL STRIP.AUTO: NEGATIVE
LYMPHOCYTES # BLD AUTO: 0.4 10*3/MM3 (ref 0.7–3.1)
LYMPHOCYTES NFR BLD AUTO: 3.8 % (ref 19.6–45.3)
M PNEUMO IGG SER IA-ACNC: NOT DETECTED
MAGNESIUM SERPL-MCNC: 2 MG/DL (ref 1.6–2.4)
MCH RBC QN AUTO: 33.7 PG (ref 26.6–33)
MCHC RBC AUTO-ENTMCNC: 34.3 G/DL (ref 31.5–35.7)
MCV RBC AUTO: 98.1 FL (ref 79–97)
METHADONE UR QL SCN: NEGATIVE
MONOCYTES # BLD AUTO: 0.9 10*3/MM3 (ref 0.1–0.9)
MONOCYTES NFR BLD AUTO: 8.7 % (ref 5–12)
NEUTROPHILS NFR BLD AUTO: 85.8 % (ref 42.7–76)
NEUTROPHILS NFR BLD AUTO: 9 10*3/MM3 (ref 1.7–7)
NITRITE UR QL STRIP: NEGATIVE
NRBC BLD AUTO-RTO: 0 /100 WBC (ref 0–0.2)
OPIATES UR QL: NEGATIVE
OXYCODONE UR QL SCN: NEGATIVE
PH UR STRIP.AUTO: 6.5 [PH] (ref 5–8)
PLATELET # BLD AUTO: 152 10*3/MM3 (ref 140–450)
PMV BLD AUTO: 7 FL (ref 6–12)
POTASSIUM SERPL-SCNC: 3.9 MMOL/L (ref 3.5–5.2)
PROCALCITONIN SERPL-MCNC: 0.12 NG/ML (ref 0–0.25)
PROT SERPL-MCNC: 6.6 G/DL (ref 6–8.5)
PROT UR QL STRIP: NEGATIVE
PROTHROMBIN TIME: 15.6 SECONDS (ref 9.6–11.7)
QT INTERVAL: 288 MS
RBC # BLD AUTO: 3.27 10*6/MM3 (ref 4.14–5.8)
RBC # UR: ABNORMAL /HPF
REF LAB TEST METHOD: ABNORMAL
RHINOVIRUS RNA SPEC NAA+PROBE: NOT DETECTED
RSV RNA NPH QL NAA+NON-PROBE: NOT DETECTED
S PNEUM AG SPEC QL LA: NEGATIVE
SALICYLATES SERPL-MCNC: <0.3 MG/DL
SARS-COV-2 RNA NPH QL NAA+NON-PROBE: NOT DETECTED
SODIUM SERPL-SCNC: 135 MMOL/L (ref 136–145)
SP GR UR STRIP: 1.02 (ref 1–1.03)
SQUAMOUS #/AREA URNS HPF: ABNORMAL /HPF
TROPONIN T SERPL-MCNC: <0.01 NG/ML (ref 0–0.03)
TSH SERPL DL<=0.05 MIU/L-ACNC: 2.18 UIU/ML (ref 0.27–4.2)
UROBILINOGEN UR QL STRIP: ABNORMAL
WBC # BLD AUTO: 10.4 10*3/MM3 (ref 3.4–10.8)
WBC UR QL AUTO: ABNORMAL /HPF

## 2021-04-13 PROCEDURE — 82077 ASSAY SPEC XCP UR&BREATH IA: CPT | Performed by: EMERGENCY MEDICINE

## 2021-04-13 PROCEDURE — 87899 AGENT NOS ASSAY W/OPTIC: CPT | Performed by: NURSE PRACTITIONER

## 2021-04-13 PROCEDURE — 99220 PR INITIAL OBSERVATION CARE/DAY 70 MINUTES: CPT | Performed by: INTERNAL MEDICINE

## 2021-04-13 PROCEDURE — 70450 CT HEAD/BRAIN W/O DYE: CPT

## 2021-04-13 PROCEDURE — 84484 ASSAY OF TROPONIN QUANT: CPT | Performed by: EMERGENCY MEDICINE

## 2021-04-13 PROCEDURE — 71045 X-RAY EXAM CHEST 1 VIEW: CPT

## 2021-04-13 PROCEDURE — 84443 ASSAY THYROID STIM HORMONE: CPT | Performed by: EMERGENCY MEDICINE

## 2021-04-13 PROCEDURE — 25010000002 PIPERACILLIN SOD-TAZOBACTAM PER 1 G: Performed by: NURSE PRACTITIONER

## 2021-04-13 PROCEDURE — G0378 HOSPITAL OBSERVATION PER HR: HCPCS

## 2021-04-13 PROCEDURE — 80179 DRUG ASSAY SALICYLATE: CPT | Performed by: EMERGENCY MEDICINE

## 2021-04-13 PROCEDURE — 80307 DRUG TEST PRSMV CHEM ANLYZR: CPT | Performed by: EMERGENCY MEDICINE

## 2021-04-13 PROCEDURE — 85025 COMPLETE CBC W/AUTO DIFF WBC: CPT | Performed by: EMERGENCY MEDICINE

## 2021-04-13 PROCEDURE — 92610 EVALUATE SWALLOWING FUNCTION: CPT

## 2021-04-13 PROCEDURE — 85610 PROTHROMBIN TIME: CPT | Performed by: EMERGENCY MEDICINE

## 2021-04-13 PROCEDURE — 96365 THER/PROPH/DIAG IV INF INIT: CPT

## 2021-04-13 PROCEDURE — 25010000002 PIPERACILLIN SOD-TAZOBACTAM PER 1 G: Performed by: EMERGENCY MEDICINE

## 2021-04-13 PROCEDURE — 85730 THROMBOPLASTIN TIME PARTIAL: CPT | Performed by: EMERGENCY MEDICINE

## 2021-04-13 PROCEDURE — 81001 URINALYSIS AUTO W/SCOPE: CPT | Performed by: EMERGENCY MEDICINE

## 2021-04-13 PROCEDURE — 93005 ELECTROCARDIOGRAM TRACING: CPT | Performed by: EMERGENCY MEDICINE

## 2021-04-13 PROCEDURE — 84145 PROCALCITONIN (PCT): CPT | Performed by: EMERGENCY MEDICINE

## 2021-04-13 PROCEDURE — 87040 BLOOD CULTURE FOR BACTERIA: CPT | Performed by: EMERGENCY MEDICINE

## 2021-04-13 PROCEDURE — 83605 ASSAY OF LACTIC ACID: CPT

## 2021-04-13 PROCEDURE — 80053 COMPREHEN METABOLIC PANEL: CPT | Performed by: EMERGENCY MEDICINE

## 2021-04-13 PROCEDURE — 82140 ASSAY OF AMMONIA: CPT | Performed by: EMERGENCY MEDICINE

## 2021-04-13 PROCEDURE — 83735 ASSAY OF MAGNESIUM: CPT | Performed by: EMERGENCY MEDICINE

## 2021-04-13 PROCEDURE — 96366 THER/PROPH/DIAG IV INF ADDON: CPT

## 2021-04-13 PROCEDURE — 80143 DRUG ASSAY ACETAMINOPHEN: CPT | Performed by: EMERGENCY MEDICINE

## 2021-04-13 PROCEDURE — 99285 EMERGENCY DEPT VISIT HI MDM: CPT

## 2021-04-13 PROCEDURE — 0202U NFCT DS 22 TRGT SARS-COV-2: CPT | Performed by: EMERGENCY MEDICINE

## 2021-04-13 PROCEDURE — 96361 HYDRATE IV INFUSION ADD-ON: CPT

## 2021-04-13 RX ORDER — SODIUM CHLORIDE 0.9 % (FLUSH) 0.9 %
10 SYRINGE (ML) INJECTION AS NEEDED
Status: DISCONTINUED | OUTPATIENT
Start: 2021-04-13 | End: 2021-04-14 | Stop reason: HOSPADM

## 2021-04-13 RX ORDER — SODIUM CHLORIDE 0.9 % (FLUSH) 0.9 %
10 SYRINGE (ML) INJECTION EVERY 12 HOURS SCHEDULED
Status: DISCONTINUED | OUTPATIENT
Start: 2021-04-13 | End: 2021-04-14 | Stop reason: HOSPADM

## 2021-04-13 RX ORDER — ACETAMINOPHEN 650 MG/1
650 SUPPOSITORY RECTAL ONCE
Status: COMPLETED | OUTPATIENT
Start: 2021-04-13 | End: 2021-04-13

## 2021-04-13 RX ORDER — CHOLECALCIFEROL (VITAMIN D3) 125 MCG
5 CAPSULE ORAL NIGHTLY PRN
Status: DISCONTINUED | OUTPATIENT
Start: 2021-04-13 | End: 2021-04-14 | Stop reason: HOSPADM

## 2021-04-13 RX ORDER — SPIRONOLACTONE 25 MG/1
75 TABLET ORAL DAILY
Status: DISCONTINUED | OUTPATIENT
Start: 2021-04-14 | End: 2021-04-14 | Stop reason: HOSPADM

## 2021-04-13 RX ORDER — MAGNESIUM SULFATE 1 G/100ML
1 INJECTION INTRAVENOUS AS NEEDED
Status: DISCONTINUED | OUTPATIENT
Start: 2021-04-13 | End: 2021-04-14 | Stop reason: HOSPADM

## 2021-04-13 RX ORDER — MAGNESIUM SULFATE HEPTAHYDRATE 40 MG/ML
2 INJECTION, SOLUTION INTRAVENOUS AS NEEDED
Status: DISCONTINUED | OUTPATIENT
Start: 2021-04-13 | End: 2021-04-14 | Stop reason: HOSPADM

## 2021-04-13 RX ORDER — ACETAMINOPHEN 325 MG/1
650 TABLET ORAL EVERY 4 HOURS PRN
Status: DISCONTINUED | OUTPATIENT
Start: 2021-04-13 | End: 2021-04-14 | Stop reason: HOSPADM

## 2021-04-13 RX ORDER — ONDANSETRON 4 MG/1
4 TABLET, FILM COATED ORAL EVERY 6 HOURS PRN
Status: DISCONTINUED | OUTPATIENT
Start: 2021-04-13 | End: 2021-04-14 | Stop reason: HOSPADM

## 2021-04-13 RX ORDER — ONDANSETRON 2 MG/ML
4 INJECTION INTRAMUSCULAR; INTRAVENOUS EVERY 6 HOURS PRN
Status: DISCONTINUED | OUTPATIENT
Start: 2021-04-13 | End: 2021-04-14 | Stop reason: HOSPADM

## 2021-04-13 RX ORDER — ACETAMINOPHEN 650 MG/1
650 SUPPOSITORY RECTAL EVERY 4 HOURS PRN
Status: DISCONTINUED | OUTPATIENT
Start: 2021-04-13 | End: 2021-04-14 | Stop reason: HOSPADM

## 2021-04-13 RX ORDER — ACETAMINOPHEN 160 MG/5ML
650 SOLUTION ORAL EVERY 4 HOURS PRN
Status: DISCONTINUED | OUTPATIENT
Start: 2021-04-13 | End: 2021-04-14 | Stop reason: HOSPADM

## 2021-04-13 RX ORDER — IPRATROPIUM BROMIDE AND ALBUTEROL SULFATE 2.5; .5 MG/3ML; MG/3ML
3 SOLUTION RESPIRATORY (INHALATION) EVERY 4 HOURS PRN
Status: DISCONTINUED | OUTPATIENT
Start: 2021-04-13 | End: 2021-04-14 | Stop reason: HOSPADM

## 2021-04-13 RX ORDER — NITROGLYCERIN 0.4 MG/1
0.4 TABLET SUBLINGUAL
Status: DISCONTINUED | OUTPATIENT
Start: 2021-04-13 | End: 2021-04-14 | Stop reason: HOSPADM

## 2021-04-13 RX ORDER — MIDODRINE HYDROCHLORIDE 2.5 MG/1
2.5 TABLET ORAL
Status: DISCONTINUED | OUTPATIENT
Start: 2021-04-13 | End: 2021-04-14 | Stop reason: HOSPADM

## 2021-04-13 RX ORDER — POTASSIUM CHLORIDE 20 MEQ/1
40 TABLET, EXTENDED RELEASE ORAL AS NEEDED
Status: DISCONTINUED | OUTPATIENT
Start: 2021-04-13 | End: 2021-04-14 | Stop reason: HOSPADM

## 2021-04-13 RX ORDER — TORSEMIDE 10 MG/1
20 TABLET ORAL DAILY
Status: DISCONTINUED | OUTPATIENT
Start: 2021-04-14 | End: 2021-04-14 | Stop reason: HOSPADM

## 2021-04-13 RX ORDER — ALUMINA, MAGNESIA, AND SIMETHICONE 2400; 2400; 240 MG/30ML; MG/30ML; MG/30ML
15 SUSPENSION ORAL EVERY 6 HOURS PRN
Status: DISCONTINUED | OUTPATIENT
Start: 2021-04-13 | End: 2021-04-14 | Stop reason: HOSPADM

## 2021-04-13 RX ORDER — LACTULOSE 10 G/15ML
20 SOLUTION ORAL DAILY
Status: DISCONTINUED | OUTPATIENT
Start: 2021-04-13 | End: 2021-04-13

## 2021-04-13 RX ORDER — TAMSULOSIN HYDROCHLORIDE 0.4 MG/1
0.4 CAPSULE ORAL DAILY
Status: DISCONTINUED | OUTPATIENT
Start: 2021-04-13 | End: 2021-04-14 | Stop reason: HOSPADM

## 2021-04-13 RX ORDER — LACTULOSE 10 G/15ML
20 SOLUTION ORAL 3 TIMES DAILY
Status: DISCONTINUED | OUTPATIENT
Start: 2021-04-13 | End: 2021-04-14 | Stop reason: HOSPADM

## 2021-04-13 RX ADMIN — Medication 100 MG: at 08:36

## 2021-04-13 RX ADMIN — ACETAMINOPHEN 650 MG: 650 SUPPOSITORY RECTAL at 02:45

## 2021-04-13 RX ADMIN — SODIUM CHLORIDE 500 ML: 9 INJECTION, SOLUTION INTRAVENOUS at 06:13

## 2021-04-13 RX ADMIN — TAMSULOSIN HYDROCHLORIDE 0.4 MG: 0.4 CAPSULE ORAL at 08:36

## 2021-04-13 RX ADMIN — LACTULOSE 20 G: 20 SOLUTION ORAL at 08:37

## 2021-04-13 RX ADMIN — PIPERACILLIN AND TAZOBACTAM 3.38 G: 3; .375 INJECTION, POWDER, LYOPHILIZED, FOR SOLUTION INTRAVENOUS at 17:47

## 2021-04-13 RX ADMIN — SODIUM CHLORIDE 500 ML: 9 INJECTION, SOLUTION INTRAVENOUS at 04:10

## 2021-04-13 RX ADMIN — Medication 10 ML: at 19:52

## 2021-04-13 RX ADMIN — PIPERACILLIN AND TAZOBACTAM 3.38 G: 3; .375 INJECTION, POWDER, LYOPHILIZED, FOR SOLUTION INTRAVENOUS at 04:10

## 2021-04-13 RX ADMIN — Medication 10 ML: at 08:36

## 2021-04-13 RX ADMIN — LACTULOSE 20 G: 20 SOLUTION ORAL at 19:51

## 2021-04-13 RX ADMIN — PIPERACILLIN AND TAZOBACTAM 3.38 G: 3; .375 INJECTION, POWDER, LYOPHILIZED, FOR SOLUTION INTRAVENOUS at 10:14

## 2021-04-13 RX ADMIN — CARBIDOPA AND LEVODOPA 2.5 MG: 50; 200 TABLET, EXTENDED RELEASE ORAL at 17:47

## 2021-04-13 RX ADMIN — CARBIDOPA AND LEVODOPA 2.5 MG: 50; 200 TABLET, EXTENDED RELEASE ORAL at 08:36

## 2021-04-13 RX ADMIN — CARBIDOPA AND LEVODOPA 2.5 MG: 50; 200 TABLET, EXTENDED RELEASE ORAL at 12:16

## 2021-04-13 RX ADMIN — LACTULOSE 20 G: 20 SOLUTION ORAL at 05:15

## 2021-04-13 RX ADMIN — LACTULOSE 20 G: 20 SOLUTION ORAL at 16:13

## 2021-04-13 NOTE — PROGRESS NOTES
Continued Stay Note   [facility]     Patient Name: Eddie Mckay  MRN: [id number]  Today's Date: 4/13/2021    Admit Date: 4/13/2021    Discharge Plan     Row Name 04/13/21 1522       Plan    Plan  Anticipate routine home with daughter and [facility] (MYRANDA order placed). May require walking oximetry prior to d/c.    Plan Comments  SW met with pt and dtr at the bedside. Per dtr, phone call was just received from [facility] in-home services to initiate once pt d/c's from hospital. SW inquired if pt's sobriety has been maintained and if any additional services might be needed. Pt and pt's dtr report that pt continues to be doing well with his sobriety from ETOH and no additional services are needed at this time. SW provided her business card to pt/pt's dtr.     Met with patient in room wearing PPE: mask, face shield/goggles.      Maintained distance greater than six feet and spent less than 15 minutes in the room.      Tracy Smith [facility] [place]    Office: [phone]  Cell: [phone]  Fax: [phone]  E-mail: [email or web address]

## 2021-04-13 NOTE — THERAPY EVALUATION
Acute Care - Speech Language Pathology   Swallow Initial Evaluation  [facility]     Patient Name: Eddie Mckay  : 1955  MRN: [id number]  Today's Date: 2021               Admit Date: 2021    Visit Dx:     ICD-10-CM ICD-9-CM   1. Aspiration pneumonitis (CMS/HCC)  J69.0 507.0   2. Altered mental status, unspecified altered mental status type  R41.82 780.97     Patient Active Problem List   Diagnosis   • Ascites due to alcoholic cirrhosis (CMS/HCC)   • Iron deficiency anemia   • Moderate protein-calorie malnutrition (CMS/HCC)   • Aspiration pneumonitis (CMS/HCC)   • Acute hepatic encephalopathy   • Hyperammonemia (CMS/HCC)   • History of alcohol abuse   • Alcoholic cirrhosis (CMS/HCC)   • Coagulopathy (CMS/HCC)   • Hypotension     Past Medical History:   Diagnosis Date   • Ascites 2021   • Ascites due to alcoholic cirrhosis (CMS/HCC) 2021   • Closed fracture of hip (CMS/HCC) 1/10/2021   • Dyspnea 2021   • History of alcohol abuse 2021   • Iron deficiency anemia 2021   • Moderate protein-calorie malnutrition (CMS/HCC) 2021   • Pleural effusion on left 2021   • Postprocedural pneumothorax 2021     Past Surgical History:   Procedure Laterality Date   • TOTAL HIP ARTHROPLASTY Right 2021    Procedure: TOTAL HIP ARTHROPLASTY ANTERIOR WITH HANA TABLE;  Surgeon: Bharath Mercado II, MD;  Location: [facility];  Service: Orthopedics;  Laterality: Right;        SWALLOW EVALUATION (last 72 hours)     .Patient was not wearing a face mask during this therapy encounter. Therapist used appropriate personal protective equipment including mask, eye protection and gloves.  Mask used was standard procedure mask. Appropriate PPE was worn during the entire therapy session. Hand hygiene was completed before and after therapy session. Patient is not in enhanced droplet precautions.      SLP Adult Swallow Evaluation     Row Name 21 1200          Subjective  Information  no complaints  (Pended)   -AF    Patient Effort  good  (Pended)   -AF          Patient Profile Reviewed  yes  (Pended)   -AF    Pertinent History Of Current Problem  Pt is a 66 y.o. male presented to [facility] on 4/13/21. Pt was brought to the ER by his daughter who became concerned following an incident of choking, coughing, having trouble breathing and AMS. Pt w/ history alcoholic cirrhosis of the liver, alcoholism, and moderate protein calorie malnutrition. ST consulted for swallow evaluation.     (Pended)   -AF    Current Method of Nutrition  NPO  (Pended)   -AF          Dentition Assessment  missing teeth;teeth are in poor condition  (Pended)   -AF    Secretion Management  WNL/WFL  (Pended)   -AF    Mucosal Quality  moist, healthy  (Pended)   -AF          Oral Motor General Assessment  WFL  (Pended)   -AF    Oral Motor, Comment  OME was undertaken. Pt demonstrated labial and lingual ROM WFL. Pt is partially edentulous missing front teeth in mandible and maxilla  (Pended)   -AF          Respiratory Support Currently in Use  nasal cannula  (Pended)   -AF    Eating/Swallowing Skills  fed by SLP;self-fed  (Pended)   -AF    Positioning During Eating  upright in bed  (Pended)   -AF    Utensils Used  spoon;straw  (Pended)   -AF    Consistencies Trialed  regular textures;thin liquids;mixed consistency;mechanical soft, no mixed consistencies;pureed  (Pended)   -AF          Oral Prep Phase  impaired  (Pended)   -AF    Oral Transit  WFL  (Pended)   -AF    Oral Residue  WFL  (Pended)   -AF    Pharyngeal Phase  suspected pharyngeal impairment  (Pended)   -AF    Esophageal Phase  unremarkable  (Pended)   -AF    Clinical Swallow Evaluation Summary  Pt seen this date for BSE. Pt repositioned to upright in bed prior to beginning exam. Pt was oriented to self and place. Pt appears confused about circumstances that brought him into the hospital, but does recall choking on some food. OME was undertaken. Pt demonstrated  labial and lingual ROM WFL. Missing anterior mandibular & maxillary dentition. Pt presented w/ trials of water by straw. Pt demonstrated Oral phase WFL on trials of this consistency. No s/s aspiration with any trials thin liquid in isolation. Pt presented w/ trials of puree x4. Pt WFL limits on oral phase of this consistency. Pt initiated timely swallow of puree. Pt followed trial of puree w/ liquid wash. Pt demonstrated wet vocal quality following liquid wash. Pt presented w/ trials of mixed consistency peaches x4. Pt exhibited prolonged and inefficient mastication on trials of peaches. Pt exhibited wet vocal quality following swallows of mixed consistency peaches. Pt presented w/ trial of peanut butter cracker. Pt demonstrated inefficient and prolonged mastication on trials of this consistency. Pt demonstrated mild residue in oral cavity following cracker trial. Residue was cleared w/ liquid wash. Possible wet vocal quality following liquid wash.     Recommend: Mechanical soft consistency and thin liquids no mixed consistencies. Reflux precautions (ensure full 90 degree HOB elevation during & for 30 minutes following all PO).  ST will continue to follow to establish tolerance of least restrictive diet for optimum nutrition and further recs, including VFSS if warranted.   (Pended)   -AF          Oral Prep Concerns  prolonged mastication;inefficient mastication  (Pended)   -AF    Prolonged Mastication  mechanical soft;regular consistencies  (Pended)   -AF    Inefficient Mastication  mechanical soft;regular consistencies  (Pended)   -AF          Pharyngeal Phase Concerns  wet vocal quality  (Pended)   -AF    Wet Vocal Quality  mixed consistencies  (Pended)   -AF    Pharyngeal Phase Concerns, Comment  Wet vocal quality following mixed consistencies and following liquid wash trials.   (Pended)   -AF          Therapy Frequency (Swallow)  PRN  (Pended)   -AF    Predicted Duration Therapy Intervention (Days)  until  discharge  (Pended)   -AF    SLP Diet Recommendation  mechanical soft with no mixed consistencies;thin liquids  (Pended)   -AF    Recommended Diagnostics  VFSS (MBS)  (Pended)  Based on full meal assessment.   -AF    Recommended Precautions and Strategies  upright posture during/after eating;small bites of food and sips of liquid  (Pended)   -AF    Oral Care Recommendations  Oral Care BID/PRN  (Pended)   -AF          Oral Nutrition/Hydration Goal Selection (SLP)  oral nutrition/hydration, SLP goal 1;oral nutrition/hydration, SLP goal 2  (Pended)   -AF          Oral Nutrition/Hydration Goal 1, SLP  Pt will tolerate safest and least restrictive diet for optimum nutrition w/o complications from aspiration.   (Pended)   -AF    Time Frame (Oral Nutrition/Hydration Goal 1, SLP)  by discharge  (Pended)   -AF          Oral Nutrition/Hydration Goal 2, SLP  Pt will be seen for full meal assessment to ensure tolerance of least restrictive diet for optimum nutrition within 24-48 hours.   (Pended)   -AF    Time Frame (Oral Nutrition/Hydration Goal 2, SLP)  2 days  (Pended)   -AF      User Key  (r) = Recorded By, (t) = Taken By, (c) = Cosigned By    Initials Name Effective Dates    Otilia Scott, Speech Therapy Student 01/12/21 -           EDUCATION  The patient,RN, and NP have been educated in the following areas:   Modified Diet Instruction, ST POC    SLP Recommendation and Plan     SLP Diet Recommendation: (P) mechanical soft with no mixed consistencies, thin liquids  Recommended Precautions and Strategies: (P) upright posture during/after eating, small bites of food and sips of liquid           Recommended Diagnostics: (P) VFSS (MBS) (Based on full meal assessment. )           Therapy Frequency (Swallow): (P) PRN  Predicted Duration Therapy Intervention (Days): (P) until discharge         SLP GOALS     Row Name 04/13/21 1200          Oral Nutrition/Hydration Goal 1, SLP  Pt will tolerate safest and least restrictive  diet for optimum nutrition w/o complications from aspiration.   (Pended)   -AF    Time Frame (Oral Nutrition/Hydration Goal 1, SLP)  by discharge  (Pended)   -AF          Oral Nutrition/Hydration Goal 2, SLP  Pt will be seen for full meal assessment to ensure tolerance of least restrictive diet for optimum nutrition within 24-48 hours.   (Pended)   -AF    Time Frame (Oral Nutrition/Hydration Goal 2, SLP)  2 days  (Pended)   -AF      User Key  (r) = Recorded By, (t) = Taken By, (c) = Cosigned By    Initials Name Provider Type    Otilia Scott, Speech Therapy Student Speech Therapy Student          Otilia Rowe, Speech Therapy Student  4/13/2021

## 2021-04-13 NOTE — H&P
[facility]      Patient Name: Eddie Mckay  : 1955  MRN: [id number]  Primary Care Physician: Aditi Mcfarland APRN  Date of admission: 2021    Patient Care Team:  Aditi Mcfarland APRN as PCP - General (Nurse Practitioner)          Subjective   History Present Illness     Chief Complaint:  Altered mental status      Mr. Mckay is a 66 y.o. male with a history of alcohol abuse, alcoholic cirrhosis with ascites, and ADALBERTO who was brought to the [facility] ED on 2021 by family due to altered mental status. The patient is lethargic. He is oriented to person, but does not recall any events overnight or this morning. He denies any current complaints. The patient states he is not on lactulose at home.    Per ER documentation, the patient's daughter reported the patient got choked on his food and had some trouble breathing and was coughing. She became concerned when he started with confusion. He was brought to the ER for further evaluation.     Upon arrival to the ER the patient was febrile with T 101.3 F. His WBC and lactate were within normal limits. His CXR showed small left basilar pleural effusion and atelectasis vs pneumonia and/or scarring. The patient was started on Zosyn and given Tylenol 650 mg TX. His temp improved, but he became hypotensive with BP 82/53. He was given 500 cc normal saline with improvement in his blood pressure. His labs were significant for hgb 11.0, Na 135, CO2 20, albumin 2.80, bilirubin 1.8, INR 1.44, ammonia 110. His CT head showed no acute findings. He was admitted for further evaluation and treatment.          Review of Systems   Unable to perform ROS: mental status change         Personal History     Past Medical History:   Past Medical History:   Diagnosis Date   • Ascites 2021   • Ascites due to alcoholic cirrhosis (CMS/HCC) 2021   • Closed fracture of hip (CMS/HCC) 1/10/2021   • Dyspnea  02/25/2021   • History of alcohol abuse 4/13/2021   • Iron deficiency anemia 2/27/2021   • Moderate protein-calorie malnutrition (CMS/HCC) 2/27/2021   • Pleural effusion on left 2/25/2021   • Postprocedural pneumothorax 2/28/2021       Surgical History:      Past Surgical History:   Procedure Laterality Date   • TOTAL HIP ARTHROPLASTY Right 1/12/2021    Procedure: TOTAL HIP ARTHROPLASTY ANTERIOR WITH HANA TABLE;  Surgeon: Bharath Mercado II, MD;  Location: [facility] MAIN OR;  Service: Orthopedics;  Laterality: Right;       Family History: Family history is unknown by patient. Otherwise pertinent FHx was reviewed and unremarkable.     Social History:  reports that he quit smoking about 11 years ago. His smokeless tobacco use includes chew. He reports previous alcohol use of about 5.0 standard drinks of alcohol per week. He reports that he does not use drugs.      Medications:  Prior to Admission medications    Medication Sig Start Date End Date Taking? Authorizing Provider   HYDROcodone-acetaminophen (NORCO) 5-325 MG per tablet Take 1 tablet by mouth Every 6 (Six) Hours As Needed.   Yes Sammi Mazariegos MD   midodrine (PROAMATINE) 2.5 MG tablet Take 1 tablet by mouth 3 (Three) Times a Day Before Meals for 30 days. 3/3/21 4/13/21 Yes León Mckenzie MD   spironolactone (ALDACTONE) 25 MG tablet Take 3 tablets by mouth Daily for 30 days. 3/4/21 4/13/21 Yes León Mckenzie MD   tamsulosin (FLOMAX) 0.4 MG capsule 24 hr capsule Take 1 capsule by mouth Daily. 1/15/21  Yes Trevor Juárez MD   Thiamine Mononitrate 100 MG tablet Take 1 tablet by mouth Daily. 3/3/21  Yes León Mckenzie MD   torsemide (DEMADEX) 20 MG tablet Take 1 tablet by mouth Daily for 30 days. 3/4/21 4/13/21 Yes León Mckenzie MD   lactulose (CHRONULAC) 10 GM/15ML solution Take 30 mL by mouth 2 (Two) Times a Day for 30 days. Goal of 2 soft bowel movements per day 3/3/21 4/13/21  León Mckenzie MD       Allergies:    Allergies   Allergen Reactions   • Sulfa  Antibiotics Unknown - High Severity       Objective   Objective     Vital Signs  Temp:  [98 °F (36.7 °C)-101.3 °F (38.5 °C)] 98 °F (36.7 °C)  Heart Rate:  [] 59  Resp:  [16-18] 18  BP: ()/(48-76) 94/53  SpO2:  [94 %-99 %] 99 %  on  Flow (L/min):  [2] 2;   Device (Oxygen Therapy): nasal cannula  Body mass index is 21.85 kg/m².    Physical Exam  Vitals reviewed.   HENT:      Head: Normocephalic.   Eyes:      Extraocular Movements: Extraocular movements intact.      Conjunctiva/sclera: Conjunctivae normal.      Pupils: Pupils are equal, round, and reactive to light.   Cardiovascular:      Rate and Rhythm: Regular rhythm. Bradycardia present.      Pulses: Normal pulses.      Heart sounds: Normal heart sounds.   Pulmonary:      Effort: Pulmonary effort is normal.      Breath sounds: Decreased breath sounds present.   Abdominal:      General: Bowel sounds are normal. There is no distension.      Palpations: Abdomen is soft.      Tenderness: There is no abdominal tenderness.   Musculoskeletal:      Cervical back: Normal range of motion.      Right lower leg: No edema.      Left lower leg: No edema.   Skin:     General: Skin is warm and dry.      Capillary Refill: Capillary refill takes less than 2 seconds.   Neurological:      Mental Status: He is lethargic.      Comments: Oriented to person          Results Review:  I have personally reviewed most recent cardiac tracings, lab results, microbiology results and radiology images and interpretations and agree with findings.    Results from last 7 days   Lab Units 04/13/21  0238   WBC 10*3/mm3 10.40   HEMOGLOBIN g/dL 11.0*   HEMATOCRIT % 32.0*   PLATELETS 10*3/mm3 152   INR  1.44*     Results from last 7 days   Lab Units 04/13/21  0301 04/13/21  0238   SODIUM mmol/L  --  135*   POTASSIUM mmol/L  --  3.9   CHLORIDE mmol/L  --  102   CO2 mmol/L  --  20.0*   BUN mg/dL  --  22   CREATININE mg/dL  --  0.79   GLUCOSE mg/dL  --  106*   CALCIUM mg/dL  --  9.0   ALT (SGPT)  U/L  --  21   AST (SGOT) U/L  --  41*   TROPONIN T ng/mL  --  <0.010   LACTATE mmol/L 1.1  --    PROCALCITONIN ng/mL  --  0.12     Estimated Creatinine Clearance: 92.6 mL/min (by C-G formula based on SCr of 0.79 mg/dL).  Brief Urine Lab Results  (Last result in the past 365 days)      Color   Clarity   Blood   Leuk Est   Nitrite   Protein   CREAT   Urine HCG        04/13/21 0251 Yellow Clear Small (1+) Negative Negative Negative               Microbiology Results (last 10 days)     Procedure Component Value - Date/Time    Respiratory Panel PCR w/COVID-19(SARS-CoV-2) JUAN/FATUMA/ELLIE/PAD/COR/MAD/CHAVO In-House, NP Swab in UTM/VTM, 3-4 HR TAT - Swab, Nasopharynx [[id number]]  (Normal) Collected: 04/13/21 0337    Lab Status: Final result Specimen: Swab from Nasopharynx Updated: 04/13/21 0431     ADENOVIRUS, PCR Not Detected     Coronavirus 229E Not Detected     Coronavirus HKU1 Not Detected     Coronavirus NL63 Not Detected     Coronavirus OC43 Not Detected     COVID19 Not Detected     Human Metapneumovirus Not Detected     Human Rhinovirus/Enterovirus Not Detected     Influenza A PCR Not Detected     Influenza B PCR Not Detected     Parainfluenza Virus 1 Not Detected     Parainfluenza Virus 2 Not Detected     Parainfluenza Virus 3 Not Detected     Parainfluenza Virus 4 Not Detected     RSV, PCR Not Detected     Bordetella pertussis pcr Not Detected     Bordetella parapertussis PCR Not Detected     Chlamydophila pneumoniae PCR Not Detected     Mycoplasma pneumo by PCR Not Detected    Narrative:      Fact sheet for providers: https://docs.[employer]/wp-content/uploads/NLD7872-3679-XP4.1-EUA-Provider-Fact-Sheet-3.pdf    Fact sheet for patients: https://docs.[employer]/wp-content/uploads/STG7353-8389-WI7.1-EUA-Patient-Fact-Sheet-1.pdf    Test performed by PCR.    Legionella Antigen, Urine - Urine, Urine, Clean Catch [[id number]]  (Normal) Collected: 04/13/21 0251    Lab Status: Final result Specimen: Urine, Clean Catch  Updated: 04/13/21 0928     LEGIONELLA ANTIGEN, URINE Negative    S. Pneumo Ag Urine or CSF - Urine, Urine, Clean Catch [[id number]]  (Normal) Collected: 04/13/21 0251    Lab Status: Final result Specimen: Urine, Clean Catch Updated: 04/13/21 0928     Strep Pneumo Ag Negative          ECG/EMG Results (most recent)     Procedure Component Value Units Date/Time    ECG 12 Lead [[id number]] Collected: 04/13/21 0223     Updated: 04/13/21 0225     QT Interval 288 ms     Narrative:      HEART RATE= 113  bpm  RR Interval= 532  ms  IA Interval= 189  ms  P Horizontal Axis= 27  deg  P Front Axis= 1  deg  QRSD Interval= 88  ms  QT Interval= 288  ms  QRS Axis= -12  deg  T Wave Axis= 224  deg  - ABNORMAL ECG -  Sinus tachycardia  Probable inferior infarct, age indeterminate  Nonspecific T abnormalities, lateral leads  Electronically Signed By:   Date and Time of Study: 2021-04-13 02:23:46          Results for orders placed during the hospital encounter of 02/25/21    Duplex Venous Lower Extremity - Right CAR    Interpretation Summary  · Normal right lower extremity venous duplex scan.      Results for orders placed during the hospital encounter of 01/10/21    Adult Transthoracic Echo Complete W/ Cont if Necessary Per Protocol    Interpretation Summary  · Left ventricular ejection fraction appears to be 61 - 65%.  · Left ventricular diastolic function is consistent with (grade I) impaired relaxation.  · The right ventricular cavity is mild to moderately dilated.  · Typically difficult study with limited acoustical windows; limited M-mode measurements and limited Doppler assessment  · No specific Doppler abnormalities appreciated on limited study; normal RV systolic pressure      CT Head Without Contrast    Result Date: 4/13/2021  1. No acute intracranial process. MRI is more sensitive for the detection of acute nonhemorrhagic infarct. 2. Mild changes small vessel ischemic disease of indeterminate age, presumably mostly chronic.  Volume loss. Atherosclerosis. 3. Paranasal sinus disease. Electronically signed by:  Mahad Preston M.D.  4/13/2021 2:12 AM    XR Chest 1 View    Result Date: 4/13/2021  Similar-appearing left basilar opacity, consistent with a small pleural effusion and underlying atelectasis, pneumonia, and/or scarring.  Electronically Signed By-Uzair De Los Santos MD On:4/13/2021 7:29 AM This report was finalized on [id number] by  Uzair De Los Santos MD.        Estimated Creatinine Clearance: 92.6 mL/min (by C-G formula based on SCr of 0.79 mg/dL).    Assessment/Plan   Assessment/Plan       Active Hospital Problems    Diagnosis  POA   • **Aspiration pneumonitis (CMS/HCC) [J69.0]  Yes   • Acute hepatic encephalopathy [K72.00]  Yes   • Hyperammonemia (CMS/HCC) [E72.20]  Yes   • History of alcohol abuse [F10.11]  Yes   • Alcoholic cirrhosis (CMS/HCC) [K70.30]  Yes   • Coagulopathy (CMS/HCC) [D68.9]  Yes   • Hypotension [I95.9]  Yes   • Iron deficiency anemia [D50.9]  Yes   • Moderate protein-calorie malnutrition (CMS/HCC) [E44.0]  Yes   • Ascites due to alcoholic cirrhosis (CMS/HCC) [K70.31]  Yes      Resolved Hospital Problems   No resolved problems to display.       Aspiration pneumonitis  -continue Zosyn  -NPO  -SLP to eval   -DuoNebs PRN  -supplemental O2 PRN to keep sat >92%    Acute hepatic encephalopathy secondary to Hyperammonemia  -ammonia 110 on admission  -patient states he is not on Lactulose at home  -start Lactulose 20 g PO TID  -repeat ammonia level in am  -needs outpatient referral to GI    Alcoholic cirrhosis   Ascites due to alcoholic cirrhosis   History of alcohol abuse  -patient reportedly sober since 01/2021  -continue thiamine  -hold diuretics today d/t hypotension  -needs outpatient follow up with GI    Hypotension  -continue midodrine  -given 500 cc NS in ER  -hold diuretics today  -monitor BP    Coagulopathy  -likely secondary to cirrhosis   -no active bleeding noted    Iron deficiency anemia  -hgb 11.0 on  admission  -monitor H&H    Moderate protein-calorie malnutrition   -consult dietitian  -monitor I&Os and daily weight             VTE Prophylaxis -   Mechanical Order History:      Ordered        04/13/21 0751  Place Sequential Compression Device  Once         04/13/21 0751  Maintain Sequential Compression Device  Continuous                 Pharmalogical Order History:     None          CODE STATUS:    Code Status and Medical Interventions:   Ordered at: 04/13/21 0751     Code Status:    CPR     Medical Interventions (Level of Support Prior to Arrest):    Full       This patient has been examined wearing appropriate Personal Protective Equipment. 04/13/21      I discussed the patient's findings and my recommendations with patient.      Signature:  Electronically signed by TAYE Foster, 04/13/21, 9:51 AM EDT.  [facility] Hospitalist Team    Reviewed and agreed with the documentation of the NP above    I also personally evaluated this patient.    66-year-old man with history of alcoholic cirrhosis-complicated by ascites--and iron deficiency anemia.  Presented to the emergency room due to altered mental status.  On arrival to the ED he was said to have a temperature of 101.3.  Chest x-ray done showed findings suggestive of left basilar infiltrate.  Ammonia level was also elevated.  CT head showed no acute intracranial abnormality.  Patient was admitted for further care.    On general examination patient is sleepy but easily arousable  Lungs-decreased air entry bilaterally  Abdomen soft, nondistended, bowel sound heard    Plan  Continue IV antibiotics for aspiration pneumonia  .On lactulose and titrate for at least 3 bowel movement daily  Repeat ammonia level in the a.m.  Further recommendation following clinical course    Electronically signed by Sergio Choudhary MD, 04/13/21, 12:43 PM EDT.

## 2021-04-13 NOTE — PLAN OF CARE
"  Problem: Adult Inpatient Plan of Care  Goal: Plan of Care Review  Outcome: Ongoing, Progressing  Flowsheets (Taken 4/13/2021 1326)  Plan of Care Reviewed With:   patient   NP & RN      66 y.o. male presented to [facility] on 4/13/21 following an incident of reported choking by pt's dtr which was followed by pt c/o SOA. PMH of alcoholic cirrhosis of the liver, alcoholism, and moderate protein calorie malnutrition. CXR revealed \"left basilar opacity, consistent with a small pleural effusion and underlying atelectasis, pneumonia, and/or scarring\" ST consulted for swallow evaluation.       Clinically, pt demonstrated oral deficits with solids in the setting of missing dentition as well as mild intermittent wet vocal quality with mixed consistency & liquid wash. No s/s aspiration clinically observed at any other time including with water by straw. Recommend mechanical soft diet, no mixed consistencies, thin liquids. Pt should implement reflux precautions. ST will follow to establish recommended diet tolerance & for further recs, including VFSS, if indicated.         "

## 2021-04-13 NOTE — PROGRESS NOTES
PT is current with [facility]. Please call [phone] or [phone] with any questions or concerns. Thank you

## 2021-04-13 NOTE — ED PROVIDER NOTES
Subjective   66-year-old male attended by daughter with concerns of confusion tonight.  Patient is currently alert and oriented does not remember being confused.  Daughter states patient went out to eat earlier this evening and choked on his food and had some trouble breathing and coughing at that point time.  Patient denies any chest pain or shortness of air headache or abdominal pain currently.  Patient admits to noncompliance with his lactulose secondary to inconvenient diarrhea.  Symptoms are all over moderate, worse with coughing.          Review of Systems   Constitutional: Positive for fatigue.   HENT:        As per HPI   Respiratory: Positive for cough and shortness of breath.    Psychiatric/Behavioral: Positive for confusion.   All other systems reviewed and are negative.      Past Medical History:   Diagnosis Date   • Ascites 2021   • Dyspnea 2021       Allergies   Allergen Reactions   • Sulfa Antibiotics Unknown - High Severity       Past Surgical History:   Procedure Laterality Date   • TOTAL HIP ARTHROPLASTY Right 2021    Procedure: TOTAL HIP ARTHROPLASTY ANTERIOR WITH HANA TABLE;  Surgeon: Bharath Mercado II, MD;  Location: [facility] OR;  Service: Orthopedics;  Laterality: Right;       Family History   Family history unknown: Yes       Social History     Socioeconomic History   • Marital status: Single     Spouse name: Not on file   • Number of children: Not on file   • Years of education: Not on file   • Highest education level: Not on file   Tobacco Use   • Smoking status: Former Smoker     Quit date:      Years since quittin.2   • Smokeless tobacco: Current User     Types: Chew   Vaping Use   • Vaping Use: Never assessed   Substance and Sexual Activity   • Alcohol use: Not Currently     Alcohol/week: 5.0 standard drinks     Types: 5 Cans of beer per week     Comment: drinks 5 beers every     • Drug use: Never   • Sexual activity: Defer           Objective    Physical Exam  Constitutional:       Appearance: Normal appearance.   HENT:      Head: Normocephalic and atraumatic.      Mouth/Throat:      Mouth: Mucous membranes are moist.      Pharynx: Oropharynx is clear.   Eyes:      Extraocular Movements: Extraocular movements intact.      Conjunctiva/sclera: Conjunctivae normal.      Pupils: Pupils are equal, round, and reactive to light.   Cardiovascular:      Rate and Rhythm: Normal rate and regular rhythm.      Heart sounds: Normal heart sounds.   Pulmonary:      Effort: Pulmonary effort is normal.      Breath sounds: Normal breath sounds.   Abdominal:      General: Bowel sounds are normal. There is no distension.      Palpations: Abdomen is soft.      Tenderness: There is no abdominal tenderness.   Musculoskeletal:         General: Normal range of motion.      Cervical back: Normal range of motion.   Skin:     General: Skin is warm and dry.      Capillary Refill: Capillary refill takes less than 2 seconds.   Neurological:      Mental Status: He is alert and oriented to person, place, and time.      Cranial Nerves: No cranial nerve deficit.      Sensory: No sensory deficit.      Motor: No weakness.   Psychiatric:         Mood and Affect: Mood normal.         Behavior: Behavior normal.         Procedures           ED Course  ED Course as of Apr 13 0541   Tue Apr 13, 2021   0323 EKG interpretation: Sinus tachycardia, rate 113, no acute ST change    [JR]      ED Course User Index  [JR] Silver Ordaz MD                                           [facility]  Number of Diagnoses or Management Options  Altered mental status, unspecified altered mental status type  Aspiration pneumonitis (CMS/HCC)  Diagnosis management comments: Results for orders placed or performed during the hospital encounter of 04/13/21  -Respiratory Panel PCR w/COVID-19(SARS-CoV-2) JUAN/FATUMA/ELLIE/PAD/COR/MAD/CHAVO In-House, NP Swab in UTM/VTM, 3-4 HR TAT - Swab, Nasopharynx  Specimen: Nasopharynx; Swab       Result                       Value             Ref Range           ADENOVIRUS, PCR             Not Detected      Not Detected        Coronavirus 229E            Not Detected      Not Detected        Coronavirus HKU1            Not Detected      Not Detected        Coronavirus NL63            Not Detected      Not Detected        Coronavirus OC43            Not Detected      Not Detected        COVID19                     Not Detected      Not Detected*       Human Metapneumovirus       Not Detected      Not Detected        Human Rhinovirus/Enter*     Not Detected      Not Detected        Influenza A PCR             Not Detected      Not Detected        Influenza B PCR             Not Detected      Not Detected        Parainfluenza Virus 1       Not Detected      Not Detected        Parainfluenza Virus 2       Not Detected      Not Detected        Parainfluenza Virus 3       Not Detected      Not Detected        Parainfluenza Virus 4       Not Detected      Not Detected        RSV, PCR                    Not Detected      Not Detected        Bordetella pertussis p*     Not Detected      Not Detected        Bordetella parapertuss*     Not Detected      Not Detected        Chlamydophila pneumoni*     Not Detected      Not Detected        Mycoplasma pneumo by P*     Not Detected      Not Detected   -Comprehensive Metabolic Panel  Specimen: Blood       Result                      Value             Ref Range           Glucose                     106 (H)           65 - 99 mg/dL       BUN                         22                8 - 23 mg/dL        Creatinine                  0.79              0.76 - 1.27 *       Sodium                      135 (L)           136 - 145 mm*       Potassium                   3.9               3.5 - 5.2 mm*       Chloride                    102               98 - 107 mmo*       CO2                         20.0 (L)          22.0 - 29.0 *       Calcium                     9.0               8.6 - 10.5  m*       Total Protein               6.6               6.0 - 8.5 g/*       Albumin                     2.80 (L)          3.50 - 5.20 *       ALT (SGPT)                  21                1 - 41 U/L          AST (SGOT)                  41 (H)            1 - 40 U/L          Alkaline Phosphatase        85                39 - 117 U/L        Total Bilirubin             1.8 (H)           0.0 - 1.2 mg*       eGFR Non  Amer       98                >60 mL/min/1*       Globulin                    3.8               gm/dL               A/G Ratio                   0.7               g/dL                BUN/Creatinine Ratio        27.8 (H)          7.0 - 25.0          Anion Gap                   13.0              5.0 - 15.0 m*  -Protime-INR  Specimen: Blood       Result                      Value             Ref Range           Protime                     15.6 (H)          9.6 - 11.7 S*       INR                         1.44 (H)          0.93 - 1.10    -aPTT  Specimen: Blood       Result                      Value             Ref Range           PTT                         33.6 (H)          24.0 - 31.0 *  -Troponin  Specimen: Blood       Result                      Value             Ref Range           Troponin T                  <0.010            0.000 - 0.03*  -Ammonia  Specimen: Blood       Result                      Value             Ref Range           Ammonia                     110 (H)           16 - 60 umol*  -Urinalysis With Culture If Indicated - Urine, Clean Catch  Specimen: Urine, Clean Catch       Result                      Value             Ref Range           Color, UA                   Yellow            Yellow, Straw       Appearance, UA              Clear             Clear               pH, UA                      6.5               5.0 - 8.0           Specific [place], UA        1.016             1.005 - 1.030       Glucose, UA                 Negative          Negative            Ketones, UA                  Negative          Negative            Bilirubin, UA               Negative          Negative            Blood, UA                                     Negative        Small (1+) (A)       Protein, UA                 Negative          Negative            Leuk Esterase, UA           Negative          Negative            Nitrite, UA                 Negative          Negative            Urobilinogen, UA            1.0 E.U./dL       0.2 - 1.0 E.*  -Acetaminophen Level  Specimen: Blood       Result                      Value             Ref Range           Acetaminophen               <5.0              0.0 - 30.0 m*  -Ethanol  Specimen: Blood       Result                      Value             Ref Range           Ethanol %                   <0.010            %              -Urine Drug Screen - Urine, Clean Catch  Specimen: Urine, Clean Catch       Result                      Value             Ref Range           Amphet/Methamphet, Scr*     Negative          Negative            Barbiturates Screen, U*     Negative          Negative            Benzodiazepine Screen,*     Negative          Negative            Cocaine Screen, Urine       Negative          Negative            Opiate Screen               Negative          Negative            THC, Screen, Urine          Negative          Negative            Methadone Screen, Urine     Negative          Negative            Oxycodone Screen, Urine     Negative          Negative       -Salicylate Level  Specimen: Blood       Result                      Value             Ref Range           Salicylate                  <0.3              <=30.0 mg/dL   -Magnesium  Specimen: Blood       Result                      Value             Ref Range           Magnesium                   2.0               1.6 - 2.4 mg*  -TSH  Specimen: Blood       Result                      Value             Ref Range           TSH                         2.180             0.270 - 4.20*  -CBC Auto  Differential  Specimen: Blood       Result                      Value             Ref Range           WBC                         10.40             3.40 - 10.80*       RBC                         3.27 (L)          4.14 - 5.80 *       Hemoglobin                  11.0 (L)          13.0 - 17.7 *       Hematocrit                  32.0 (L)          37.5 - 51.0 %       MCV                         98.1 (H)          79.0 - 97.0 *       MCH                         33.7 (H)          26.6 - 33.0 *       MCHC                        34.3              31.5 - 35.7 *       RDW                         18.3 (H)          12.3 - 15.4 %       RDW-SD                      61.3 (H)          37.0 - 54.0 *       MPV                         7.0               6.0 - 12.0 fL       Platelets                   152               140 - 450 10*       Neutrophil %                85.8 (H)          42.7 - 76.0 %       Lymphocyte %                3.8 (L)           19.6 - 45.3 %       Monocyte %                  8.7               5.0 - 12.0 %        Eosinophil %                0.8               0.3 - 6.2 %         Basophil %                  0.9               0.0 - 1.5 %         Neutrophils, Absolute       9.00 (H)          1.70 - 7.00 *       Lymphocytes, Absolute       0.40 (L)          0.70 - 3.10 *       Monocytes, Absolute         0.90              0.10 - 0.90 *       Eosinophils, Absolute       0.10              0.00 - 0.40 *       Basophils, Absolute         0.10              0.00 - 0.20 *       nRBC                        0.0               0.0 - 0.2 /1*  -Urinalysis, Microscopic Only - Urine, Clean Catch  Specimen: Urine, Clean Catch       Result                      Value             Ref Range           RBC, UA                     6-12 (A)          None Seen /H*       WBC, UA                     None Seen         None Seen /H*       Bacteria, UA                None Seen         None Seen /H*       Squamous Epithelial Ce*     None Seen          None Seen, 0*       Hyaline Casts, UA           0-2               None Seen /L*       Methodology                                                   Automated Microscopy  -Procalcitonin  Specimen: Blood       Result                      Value             Ref Range           Procalcitonin               0.12              0.00 - 0.25 *  -POC Lactate  Specimen: Blood       Result                      Value             Ref Range           Lactate                     1.1               0.5 - 2.0 mm*  -ECG 12 Lead       Result                      Value             Ref Range           QT Interval                 288               ms             -Gold Top - SST       Result                      Value             Ref Range           Extra Tube                                                    Hold for add-ons.    Chest x-ray with left pleural effusion, no dense pneumonia seen though history and subsequent fever is very suspicious for aspiration pneumonitis, will cover with Zosyn, no hypotension, lactulose for elevated ammonia.       Amount and/or Complexity of Data Reviewed  Clinical lab tests: ordered and reviewed  Tests in the radiology section of CPT®: ordered and reviewed  Tests in the medicine section of CPT®: reviewed and ordered  Decide to obtain previous medical records or to obtain history from someone other than the patient: yes  Independent visualization of images, tracings, or specimens: yes        Final diagnoses:   Aspiration pneumonitis (CMS/HCC)   Altered mental status, unspecified altered mental status type       ED Disposition  ED Disposition     ED Disposition Condition Comment    Decision to Admit  Level of Care: Telemetry [5]   Diagnosis: Aspiration pneumonitis (CMS/HCC) [947282]   Admitting Physician: MALINDA GARCIA [[id number]]            No follow-up provider specified.       Medication List      No changes were made to your prescriptions during this visit.          Silver Ordaz MD  04/13/21 0524

## 2021-04-13 NOTE — PROGRESS NOTES
Discharge Planning Assessment   [place]     Patient Name: Eddie Mckay  MRN: [id number]  Today's Date: 4/13/2021    Admit Date: 4/13/2021    Discharge Needs Assessment     Row Name 04/13/21 2609       Living Environment    Lives With  alone;child(micah), adult;other (see comments) adult daughter has been staying with patient since recent d/c on 3/3    Current Living Arrangements  home/apartment/condo    Primary Care Provided by  self;child(micah)    Provides Primary Care For  no one, unable/limited ability to care for self    Family Caregiver if Needed  child(micah), adult    Quality of Family Relationships  helpful    Able to Return to Prior Arrangements  yes       Resource/Environmental Concerns    Resource/Environmental Concerns  none    Transportation Concerns  car, none       Transition Planning    Patient/Family Anticipates Transition to  home with help/services;home with family    Patient/Family Anticipated Services at Transition  ;home health care    Transportation Anticipated  family or friend will provide       Discharge Needs Assessment    Readmission Within the Last 30 Days  no previous admission in last 30 days    Current Outpatient/Agency/Support Group  homecare agency    Concerns to be Addressed  discharge planning    Anticipated Changes Related to Illness  inability to care for self    Equipment Needed After Discharge  none    Provided Post Acute Provider List?  N/A    N/A Provider List Comment  current with [facility]    Current Discharge Risk  chronically ill;physical impairment        Discharge Plan     Row Name 04/13/21 4324       Plan    Plan  Anticipate routine home with daughter and [facility] (MYRANDA order placed). May require walking oximetry prior to d/c.    Patient/Family in Agreement with Plan  yes    Plan Comments  Met with patient and daughter at bedside. Daughter provided the information. She reports she has been staying with patient since recent hospital d/c on 3/3.  Current with [facility] and wants to resume their services, notified Alea mijares and MYRANDA order placed. Daughter reports patient is planning to move into her house soon after d/c and she plans to provide patient with 24/7 care. Daughter provides transportation and will transport home on d/c. PCP and pharmacy confirmed. No issues affording food or medications. She stated that working with [facility]  for services. Tracy MOSES informed and noted ETOH history. DC barriers: IV abx, elevated ammonia, 2 L NC        Continued Care and Services - Admitted Since 4/13/2021     Home Medical Care Coordination complete    Service Provider Request Status Selected Services Address Phone Fax Patient Preferred    [facility] [address] [facility] [id number] [phone] --                 Expected Discharge Date and Time     Expected Discharge Date Expected Discharge Time    Apr 15, 2021         Demographic Summary     Row Name 04/13/21 1332       General Information    Admission Type  observation    Arrived From  emergency department    Required Notices Provided  Observation Status Notice    Referral Source  admission list    Reason for Consult  discharge planning    Preferred Language  English        Functional Status     Row Name 04/13/21 1334       Functional Status    Usual Activity Tolerance  poor    Current Activity Tolerance  poor       Functional Status, IADL    Medications  assistive person    Meal Preparation  assistive person    Housekeeping  assistive person    Laundry  assistive person    Shopping  assistive person          Patient Forms     Row Name 04/13/21 1342       Patient Forms    Important Message from Medicare (IMM)  -- Anne 4/13    Patient Observation Letter  Delivered Anne 4/13        Met with patient in room wearing PPE: mask, goggles.      Maintained distance greater than six feet and spent less than 15 minutes in the  room.          Leeanna Mcfarlane RN

## 2021-04-13 NOTE — DISCHARGE PLACEMENT REQUEST
"Eddie Varner (66 y.o. Male)     Date of Birth Social Security Number Address Home Phone MRN    1955  [id number] [place] [address] [phone] [id number]    [employer] Marital Status          None Single       Admission Date Admission Type Admitting Provider Attending Provider Department, Room/Bed    4/13/21 Emergency Sergio Choudhary MD Ozor, Uchenna, MD [facility] 2C MEDICAL INPATIENT, 246/1    Discharge Date Discharge Disposition Discharge Destination                       Attending Provider: Sergio Choudhary MD    Allergies: Sulfa Antibiotics    Isolation: None   Infection: None   Code Status: CPR    Ht: 182.9 cm (72.01\")   Wt: 73.1 kg (161 lb 2.5 oz)    Admission Cmt: None   Principal Problem: None                Active Insurance as of 4/13/2021     Primary Coverage     Payor Plan Insurance Group Employer/Plan Group    MEDICARE MEDICARE A & B      Payor Plan Address Payor Plan Phone Number Payor Plan Fax Number Effective Dates    [address] [phone]  1/1/2019 - None Entered    [facility] [id number]       Subscriber Name Subscriber Birth Date Member ID       LACY VARNER 1955 [id number]           Secondary Coverage     Payor Plan Insurance Group Employer/Plan Group    [place]      Payor Plan Address Payor Plan Phone Number Payor Plan Fax Number Effective Dates    [address]   4/1/2021 - None Entered    [place] [address]       Subscriber Name Subscriber Birth Date Member ID       EDDIE VARNER 1955 [id number]                 Emergency Contacts      (Rel.) Home Phone Work Phone Mobile Phone    LIO NEUMANN (Daughter) [phone] -- [phone]              "

## 2021-04-13 NOTE — ED NOTES
"Daughter states that she has to go upstairs with patient no excuses about it. She states that patient will \"become a handful and get combative\" if she is not with him. She states that if she is not with him he will try to get up and fall. She is insistent that I call and relay this info to his floor nurse. I informed daughter that visitation hours are 7:30-5:30 and she will be able to join patient shortly if transport comes to get patient before visitation hours. She then told me that I need to call the floor and speak to patient floor nurse and talk to charge nurse upstairs.     I have called the floor nurse and informed him of daughters wishes, floor nurse states that he will have to talk to charge nurse.      Irma Nguyen LPN  04/13/21 0634    "

## 2021-04-14 VITALS
HEART RATE: 67 BPM | HEIGHT: 72 IN | WEIGHT: 162.04 LBS | RESPIRATION RATE: 18 BRPM | OXYGEN SATURATION: 97 % | TEMPERATURE: 97.4 F | DIASTOLIC BLOOD PRESSURE: 77 MMHG | SYSTOLIC BLOOD PRESSURE: 127 MMHG | BODY MASS INDEX: 21.95 KG/M2

## 2021-04-14 LAB
ALBUMIN SERPL-MCNC: 2.8 G/DL (ref 3.5–5.2)
ALBUMIN/GLOB SERPL: 0.8 G/DL
ALP SERPL-CCNC: 91 U/L (ref 39–117)
ALT SERPL W P-5'-P-CCNC: 20 U/L (ref 1–41)
AMMONIA BLD-SCNC: 44 UMOL/L (ref 16–60)
ANION GAP SERPL CALCULATED.3IONS-SCNC: 8 MMOL/L (ref 5–15)
AST SERPL-CCNC: 47 U/L (ref 1–40)
BASOPHILS # BLD AUTO: 0 10*3/MM3 (ref 0–0.2)
BASOPHILS NFR BLD AUTO: 0.4 % (ref 0–1.5)
BILIRUB SERPL-MCNC: 2.5 MG/DL (ref 0–1.2)
BUN SERPL-MCNC: 17 MG/DL (ref 8–23)
BUN/CREAT SERPL: 19.3 (ref 7–25)
CALCIUM SPEC-SCNC: 8.5 MG/DL (ref 8.6–10.5)
CHLORIDE SERPL-SCNC: 107 MMOL/L (ref 98–107)
CO2 SERPL-SCNC: 22 MMOL/L (ref 22–29)
CREAT SERPL-MCNC: 0.88 MG/DL (ref 0.76–1.27)
DEPRECATED RDW RBC AUTO: 64.8 FL (ref 37–54)
EOSINOPHIL # BLD AUTO: 0.3 10*3/MM3 (ref 0–0.4)
EOSINOPHIL NFR BLD AUTO: 5.5 % (ref 0.3–6.2)
ERYTHROCYTE [DISTWIDTH] IN BLOOD BY AUTOMATED COUNT: 18.7 % (ref 12.3–15.4)
GFR SERPL CREATININE-BSD FRML MDRD: 86 ML/MIN/1.73
GLOBULIN UR ELPH-MCNC: 3.7 GM/DL
GLUCOSE SERPL-MCNC: 78 MG/DL (ref 65–99)
HCT VFR BLD AUTO: 31.2 % (ref 37.5–51)
HGB BLD-MCNC: 10.7 G/DL (ref 13–17.7)
LYMPHOCYTES # BLD AUTO: 0.5 10*3/MM3 (ref 0.7–3.1)
LYMPHOCYTES NFR BLD AUTO: 7.8 % (ref 19.6–45.3)
MAGNESIUM SERPL-MCNC: 2.1 MG/DL (ref 1.6–2.4)
MCH RBC QN AUTO: 34 PG (ref 26.6–33)
MCHC RBC AUTO-ENTMCNC: 34.4 G/DL (ref 31.5–35.7)
MCV RBC AUTO: 98.7 FL (ref 79–97)
MONOCYTES # BLD AUTO: 0.6 10*3/MM3 (ref 0.1–0.9)
MONOCYTES NFR BLD AUTO: 9.5 % (ref 5–12)
NEUTROPHILS NFR BLD AUTO: 4.7 10*3/MM3 (ref 1.7–7)
NEUTROPHILS NFR BLD AUTO: 76.8 % (ref 42.7–76)
NRBC BLD AUTO-RTO: 0 /100 WBC (ref 0–0.2)
PLATELET # BLD AUTO: 123 10*3/MM3 (ref 140–450)
PMV BLD AUTO: 7 FL (ref 6–12)
POTASSIUM SERPL-SCNC: 4.2 MMOL/L (ref 3.5–5.2)
PROT SERPL-MCNC: 6.5 G/DL (ref 6–8.5)
RBC # BLD AUTO: 3.16 10*6/MM3 (ref 4.14–5.8)
SODIUM SERPL-SCNC: 137 MMOL/L (ref 136–145)
WBC # BLD AUTO: 6.1 10*3/MM3 (ref 3.4–10.8)

## 2021-04-14 PROCEDURE — 96366 THER/PROPH/DIAG IV INF ADDON: CPT

## 2021-04-14 PROCEDURE — 96361 HYDRATE IV INFUSION ADD-ON: CPT

## 2021-04-14 PROCEDURE — 83735 ASSAY OF MAGNESIUM: CPT | Performed by: NURSE PRACTITIONER

## 2021-04-14 PROCEDURE — 82140 ASSAY OF AMMONIA: CPT | Performed by: NURSE PRACTITIONER

## 2021-04-14 PROCEDURE — G0378 HOSPITAL OBSERVATION PER HR: HCPCS

## 2021-04-14 PROCEDURE — 85025 COMPLETE CBC W/AUTO DIFF WBC: CPT | Performed by: NURSE PRACTITIONER

## 2021-04-14 PROCEDURE — 80053 COMPREHEN METABOLIC PANEL: CPT | Performed by: INTERNAL MEDICINE

## 2021-04-14 PROCEDURE — 94618 PULMONARY STRESS TESTING: CPT

## 2021-04-14 PROCEDURE — 99217 PR OBSERVATION CARE DISCHARGE MANAGEMENT: CPT | Performed by: INTERNAL MEDICINE

## 2021-04-14 PROCEDURE — 25010000002 PIPERACILLIN SOD-TAZOBACTAM PER 1 G: Performed by: NURSE PRACTITIONER

## 2021-04-14 PROCEDURE — 92526 ORAL FUNCTION THERAPY: CPT

## 2021-04-14 RX ORDER — GAUZE BANDAGE 2" X 2"
100 BANDAGE TOPICAL DAILY
Qty: 30 TABLET | Refills: 0 | Status: SHIPPED | OUTPATIENT
Start: 2021-04-14 | End: 2021-05-14

## 2021-04-14 RX ORDER — AMOXICILLIN AND CLAVULANATE POTASSIUM 875; 125 MG/1; MG/1
1 TABLET, FILM COATED ORAL EVERY 12 HOURS SCHEDULED
Qty: 13 TABLET | Refills: 0 | Status: SHIPPED | OUTPATIENT
Start: 2021-04-14 | End: 2021-04-21

## 2021-04-14 RX ORDER — LACTULOSE 10 G/15ML
20 SOLUTION ORAL 2 TIMES DAILY
Qty: 473 ML | Refills: 1 | Status: SHIPPED | OUTPATIENT
Start: 2021-04-14

## 2021-04-14 RX ORDER — MIDODRINE HYDROCHLORIDE 2.5 MG/1
2.5 TABLET ORAL
Qty: 90 TABLET | Refills: 0 | Status: SHIPPED | OUTPATIENT
Start: 2021-04-14 | End: 2021-05-14

## 2021-04-14 RX ORDER — TAMSULOSIN HYDROCHLORIDE 0.4 MG/1
0.4 CAPSULE ORAL DAILY
Qty: 30 CAPSULE | Refills: 0 | Status: SHIPPED | OUTPATIENT
Start: 2021-04-14 | End: 2021-05-14

## 2021-04-14 RX ORDER — MORPHINE SULFATE 4 MG/ML
2 INJECTION, SOLUTION INTRAMUSCULAR; INTRAVENOUS DAILY PRN
Status: DISCONTINUED | OUTPATIENT
Start: 2021-04-14 | End: 2021-04-14

## 2021-04-14 RX ORDER — TORSEMIDE 20 MG/1
20 TABLET ORAL DAILY
Qty: 30 TABLET | Refills: 0 | Status: SHIPPED | OUTPATIENT
Start: 2021-04-14 | End: 2021-05-14

## 2021-04-14 RX ORDER — SPIRONOLACTONE 25 MG/1
75 TABLET ORAL DAILY
Qty: 90 TABLET | Refills: 0 | Status: SHIPPED | OUTPATIENT
Start: 2021-04-14 | End: 2021-05-14

## 2021-04-14 RX ORDER — AMOXICILLIN AND CLAVULANATE POTASSIUM 875; 125 MG/1; MG/1
1 TABLET, FILM COATED ORAL EVERY 12 HOURS SCHEDULED
Status: DISCONTINUED | OUTPATIENT
Start: 2021-04-14 | End: 2021-04-14 | Stop reason: HOSPADM

## 2021-04-14 RX ADMIN — CARBIDOPA AND LEVODOPA 2.5 MG: 50; 200 TABLET, EXTENDED RELEASE ORAL at 11:46

## 2021-04-14 RX ADMIN — Medication 100 MG: at 08:02

## 2021-04-14 RX ADMIN — TORSEMIDE 20 MG: 10 TABLET ORAL at 08:02

## 2021-04-14 RX ADMIN — CARBIDOPA AND LEVODOPA 2.5 MG: 50; 200 TABLET, EXTENDED RELEASE ORAL at 08:02

## 2021-04-14 RX ADMIN — SPIRONOLACTONE 75 MG: 25 TABLET ORAL at 08:02

## 2021-04-14 RX ADMIN — AMOXICILLIN AND CLAVULANATE POTASSIUM 1 TABLET: 875; 125 TABLET, FILM COATED ORAL at 09:50

## 2021-04-14 RX ADMIN — PIPERACILLIN AND TAZOBACTAM 3.38 G: 3; .375 INJECTION, POWDER, LYOPHILIZED, FOR SOLUTION INTRAVENOUS at 02:12

## 2021-04-14 RX ADMIN — Medication 10 ML: at 08:03

## 2021-04-14 RX ADMIN — TAMSULOSIN HYDROCHLORIDE 0.4 MG: 0.4 CAPSULE ORAL at 08:03

## 2021-04-14 NOTE — PLAN OF CARE
Goal Outcome Evaluation:  Plan of Care Reviewed With: patient  Progress: no change  Outcome Summary: Pt is resting in bed. Pt shows no s/s of distress and vitals are stable. Pt shows no s/s of distress and vitals are stable. Bed alarm on and call light within reach. Will continue to monitor.

## 2021-04-14 NOTE — THERAPY TREATMENT NOTE
"Acute Care - Speech Language Pathology   Swallow Treatment Note  [place]     Patient Name: Eddie Mckay  : 1955  MRN: [id number]  Today's Date: 2021               Admit Date: 2021    Visit Dx:     ICD-10-CM ICD-9-CM   1. Aspiration pneumonitis (CMS/HCC)  J69.0 507.0   2. Altered mental status, unspecified altered mental status type  R41.82 780.97   3. Alcoholic cirrhosis, unspecified whether ascites present (CMS/HCC)  K70.30 571.2     Patient Active Problem List   Diagnosis   • Ascites due to alcoholic cirrhosis (CMS/HCC)   • Iron deficiency anemia   • Moderate protein-calorie malnutrition (CMS/HCC)   • Aspiration pneumonitis (CMS/HCC)   • Acute hepatic encephalopathy   • Hyperammonemia (CMS/HCC)   • History of alcohol abuse   • Alcoholic cirrhosis (CMS/HCC)   • Coagulopathy (CMS/HCC)   • Hypotension     Past Medical History:   Diagnosis Date   • Ascites 2021   • Ascites due to alcoholic cirrhosis (CMS/HCC) 2021   • Closed fracture of hip (CMS/HCC) 1/10/2021   • Dyspnea 2021   • History of alcohol abuse 2021   • Iron deficiency anemia 2021   • Moderate protein-calorie malnutrition (CMS/HCC) 2021   • Pleural effusion on left 2021   • Postprocedural pneumothorax 2021     Past Surgical History:   Procedure Laterality Date   • TOTAL HIP ARTHROPLASTY Right 2021    Procedure: TOTAL HIP ARTHROPLASTY ANTERIOR WITH HANA TABLE;  Surgeon: Bharath Mercado II, MD;  Location: [facility];  Service: Orthopedics;  Laterality: Right;        SWALLOW EVALUATION (last 72 hours)      SLP Adult Swallow Evaluation     Row Name 21 1300       Rehab Evaluation    Document Type  therapy note (daily note)  -EC    Subjective Information  complains of wants to d/c home  -EC    Patient Observations  alert  -EC    Patient/Family/Caregiver Comments/Observations  Daughter at bedside and states pt is \"pissed off, he wants to go home.\"  -EC    Care Plan Review  " "evaluation/treatment results reviewed;care plan/treatment goals reviewed;patient/other agree to care plan  -EC    Care Plan Review, Other Participant(s)  daughter  -EC    Patient Effort  fair  -EC    Comment  Skilled ST conducted targeting dysphagia this date. Pt only ordered jello for lunch today. At meal assessment, pt initially states he is not hungry though does eventually take bites of jello and sips of thin liquid w/prompting from daughter for assessment. Oral transit appears functional for jello and thin liquid. Daughter states pt had \"choked\" 2x at home recently, once on potato chips and another time on meat. She states he has been tolerating recommended mechanical soft diet here. Daughter reports pt will be living w/her after d/c. Education completed re: overt s/s of dysphagia, follow up outpatient services if needed and mechanical soft diet information in the form of a list of safe foods and foods to avoid and ways to prepare mechanical soft foods at home. Recommend pt continue mechanical soft after d/c w/follow up as outpatient for swallowing as needed.  -EC          Clinical Impression    Daily Summary of Progress (SLP)  progress toward functional goals is good  -EC       Recommendations    Therapy Frequency (Swallow)  PRN  -EC    Predicted Duration Therapy Intervention (Days)  until discharge  -EC    SLP Diet Recommendation  mechanical soft with no mixed consistencies;thin liquids  -EC    Recommended Diagnostics  --    Recommended Precautions and Strategies  upright posture during/after eating;small bites of food and sips of liquid  -EC    Oral Care Recommendations  Oral Care BID/PRN  -EC       Swallow Goals (SLP)    Oral Nutrition/Hydration Goal Selection (SLP)  oral nutrition/hydration, SLP goal 1;oral nutrition/hydration, SLP goal 2  -EC       Oral Nutrition/Hydration Goal 1 (SLP)    Oral Nutrition/Hydration Goal 1, SLP  Pt will tolerate safest and least restrictive diet for optimum nutrition w/o " complications from aspiration.   -EC    Time Frame (Oral Nutrition/Hydration Goal 1, SLP)  by discharge  -EC    Barriers (Oral Nutrition/Hydration Goal 1, SLP)  see above note  -EC    Progress/Outcomes (Oral Nutrition/Hydration Goal 1, SLP)  good progress toward goal;goal ongoing  -EC       Oral Nutrition/Hydration Goal 2 (SLP)    Oral Nutrition/Hydration Goal 2, SLP  Pt will be seen for full meal assessment to ensure tolerance of least restrictive diet for optimum nutrition within 24-48 hours.   -EC    Time Frame (Oral Nutrition/Hydration Goal 2, SLP)  2 days  -EC    Barriers (Oral Nutrition/Hydration Goal 2, SLP)  see above note  -EC    Progress/Outcomes (Oral Nutrition/Hydration Goal 2, SLP)  goal ongoing;good progress toward goal  -EC      User Key  (r) = Recorded By, (t) = Taken By, (c) = Cosigned By    Initials Name Effective Dates    EC Khushboo Arias 03/01/19 -     AF Otilia Rowe, Speech Therapy Student 01/12/21 -           EDUCATION  The patient has been educated in the following areas:   Dysphagia (Swallowing Impairment) Modified Diet Instruction.    SLP Recommendation and Plan     SLP Diet Recommendation: mechanical soft with no mixed consistencies, thin liquids  Recommended Precautions and Strategies: upright posture during/after eating, small bites of food and sips of liquid                       Therapy Frequency (Swallow): PRN  Predicted Duration Therapy Intervention (Days): until discharge     Daily Summary of Progress (SLP): progress toward functional goals is good                        SLP GOALS     Row Name 04/14/21 1300 04/13/21 1200          Oral Nutrition/Hydration Goal 1 (SLP)    Oral Nutrition/Hydration Goal 1, SLP  Pt will tolerate safest and least restrictive diet for optimum nutrition w/o complications from aspiration.   -EC  Pt will tolerate safest and least restrictive diet for optimum nutrition w/o complications from aspiration.   (Pended)   -AF     Time Frame (Oral  Nutrition/Hydration Goal 1, SLP)  by discharge  -EC  by discharge  (Pended)   -AF     Barriers (Oral Nutrition/Hydration Goal 1, SLP)  see above note  -EC  --     Progress/Outcomes (Oral Nutrition/Hydration Goal 1, SLP)  good progress toward goal;goal ongoing  -EC  --        Oral Nutrition/Hydration Goal 2 (SLP)    Oral Nutrition/Hydration Goal 2, SLP  Pt will be seen for full meal assessment to ensure tolerance of least restrictive diet for optimum nutrition within 24-48 hours.   -EC  Pt will be seen for full meal assessment to ensure tolerance of least restrictive diet for optimum nutrition within 24-48 hours.   (Pended)   -AF     Time Frame (Oral Nutrition/Hydration Goal 2, SLP)  2 days  -EC  2 days  (Pended)   -AF     Barriers (Oral Nutrition/Hydration Goal 2, SLP)  see above note  -EC  --     Progress/Outcomes (Oral Nutrition/Hydration Goal 2, SLP)  goal ongoing;good progress toward goal  -EC  --       User Key  (r) = Recorded By, (t) = Taken By, (c) = Cosigned By    Initials Name Provider Type    Khushboo Herrera Speech and Language Pathologist    AF Otilia Rowe, Speech Therapy Student Speech Therapy Student             Time Calculation:                Khushboo Arias  4/14/2021

## 2021-04-14 NOTE — PROGRESS NOTES
Exercise Oximetry    Patient Name:Eddie Mckay   MRN: [id number]   Date: 04/14/21             ROOM AIR BASELINE   SpO2% 96   Heart Rate    Blood Pressure      EXERCISE ON ROOM AIR SpO2% EXERCISE ON O2 @ LPM SpO2%   1 MINUTE  1 MINUTE    2 MINUTES  2 MINUTES    3 MINUTES  3 MINUTES    4 MINUTES  4 MINUTES    5 MINUTES  5 MINUTES    6 MINUTES  6 MINUTES               Distance Walked   Distance Walked   Dyspnea (Kelton Scale)   Dyspnea (Kelton Scale)   Fatigue (Kelton Scale)   Fatigue (Kelton Scale)   SpO2% Post Exercise  96% room air SpO2% Post Exercise   HR Post Exercise   HR Post Exercise   Time to Recovery   Time to Recovery     Comments: Patient had been up walking in room prior to entering to use restroom. Sats were 96% on room air, no complaint of shortness of breathe.

## 2021-04-14 NOTE — DISCHARGE PLACEMENT REQUEST
"Eddie Varner (66 y.o. Male)     Date of Birth Social Security Number Address Home Phone MRN    1955  [address] [place] [address] [phone] [id number]    [employer] Marital Status          None Single       Admission Date Admission Type Admitting Provider Attending Provider Department, Room/Bed    4/13/21 Emergency Sergio Choudhary MD Ozor, [place], MD [facility] 2C MEDICAL INPATIENT, 246/1    Discharge Date Discharge Disposition Discharge Destination                       Attending Provider: Sergio Choudhary MD    Allergies: Sulfa Antibiotics    Isolation: None   Infection: None   Code Status: CPR    Ht: 182.9 cm (72.01\")   Wt: 73.5 kg (162 lb 0.6 oz)    Admission Cmt: None   Principal Problem: Aspiration pneumonitis (CMS/[facility]) [J69.0]                 Active Insurance as of 4/13/2021     Primary Coverage     Payor Plan Insurance Group Employer/Plan Group    MEDICARE MEDICARE A & B      Payor Plan Address Payor Plan Phone Number Payor Plan Fax Number Effective Dates    [address] [phone]  1/1/2019 - None Entered    [facility] [address]       Subscriber Name Subscriber Birth Date Member ID       EDDIE VARENR 1955 [id number]           Secondary Coverage     Payor Plan Insurance Group Employer/Plan Group    [place]      Payor Plan Address Payor Plan Phone Number Payor Plan Fax Number Effective Dates    [address]   4/1/2021 - None Entered    [place] [address]       Subscriber Name Subscriber Birth Date Member ID       EDDIE VARNER 1955 [id number]                 Emergency Contacts      (Rel.) Home Phone Work Phone Mobile Phone    LIO NEUMANN (Daughter) [phone] -- [phone]            Emergency Contact Information     Name Relation Home Work Mobile    LIO NEUMANN Daughter [phone]          Insurance Information                MEDICARE/MEDICARE A & B Phone: [phone]    Subscriber: " Eddie Mckay Subscriber#: [id number]    Group#:  Precert#:         [place] Phone:     Subscriber: Eddie Mckay Subscriber#: [id number]    Group#:  Precert#:

## 2021-04-14 NOTE — PLAN OF CARE
Continued Stay Note   [place]     Patient Name: Eddie Mckay  MRN: [id number]  Today's Date: 4/14/2021    Admit Date: 4/13/2021    Discharge Plan     Row Name 04/14/21 0908       Plan    Plan  Anticipate routine home with daughter and [facility] (MYRANDA order placed). May require walking oximetry prior to d/c.    Plan Comments  Due to pt being active with [facility] Medicaid Waiver services, SW faxed face sheet to [facility] for purposes of continuity of care.        Phone communication or documentation only - no physical contact with patient or family.    Tracy Smith [facility], W    Office: [phone]  Cell: [phone]  Fax: [phone]  E-mail: [email or web address]

## 2021-04-14 NOTE — DISCHARGE SUMMARY
[facility] Medicine Services  DISCHARGE SUMMARY        Prepared For PCP:  Aditi Mcfarland APRN    Patient Name: Eddie Mckay  : 1955  MRN: [id number]      Date of Admission:   2021    Date of Discharge:  2021    Length of stay:  LOS: 0 days     Hospital Course     Presenting Problem:   Aspiration pneumonitis (CMS/HCC) [J69.0]  Altered mental status, unspecified altered mental status type [R41.82]      Active Hospital Problems    Diagnosis  POA   • **Aspiration pneumonitis (CMS/HCC) [J69.0]  Yes   • Acute hepatic encephalopathy [K72.00]  Yes   • Hyperammonemia (CMS/HCC) [E72.20]  Yes   • History of alcohol abuse [F10.11]  Yes   • Alcoholic cirrhosis (CMS/HCC) [K70.30]  Yes   • Coagulopathy (CMS/HCC) [D68.9]  Yes   • Hypotension [I95.9]  Yes   • Iron deficiency anemia [D50.9]  Yes   • Moderate protein-calorie malnutrition (CMS/HCC) [E44.0]  Yes   • Ascites due to alcoholic cirrhosis (CMS/HCC) [K70.31]  Yes      Resolved Hospital Problems   No resolved problems to display.           Hospital Course:  66-year-old man with history of alcoholic cirrhosis-complicated by ascites--and iron deficiency anemia.  Presented to the emergency room due to altered mental status.  On arrival to the ED he was said to have a temperature of 101.3.  Chest x-ray done showed findings suggestive of left basilar infiltrate.  Ammonia level was also elevated.  CT head showed no acute intracranial abnormality.  Patient was admitted for further care.    Conditions addressed while inpatient are as stated below    Aspiration pneumonitis  Initially on Zosyn while inpatient  Was evaluated by speech therapist who recommended mechanical soft diet and thin liquid.  Patient will be discharged on Vantin to complete antibiotics course     Acute hepatic encephalopathy secondary to Hyperammonemia due to noncompliance with home medication-lactulose  ammonia 110 on admission  Mental status back to  baseline  Patient was encouraged to continue outpatient lactulose     Alcoholic cirrhosis   Ascites due to alcoholic cirrhosis   History of alcohol abuse  patient reportedly sober since 01/2021  Continue on home medication of diuretics  Outpatient referral to GI made at discharge     Hypotension-chronic  On midodrine       Coagulopathy  likely secondary to cirrhosis   no active bleeding noted     Iron deficiency anemia  hgb 11.0 on admission       Moderate protein-calorie malnutrition   Seen by dietitian while inpatient       Recommendation for Outpatient Providers:             Reasons For Change In Medications and Indications for New Medications:        Day of Discharge     HPI:       Vital Signs:   Temp:  [97.4 °F (36.3 °C)-98 °F (36.7 °C)] 97.4 °F (36.3 °C)  Heart Rate:  [58-67] 67  Resp:  [18] 18  BP: ()/(55-77) 127/77     Physical Exam:  Physical Exam  Vitals reviewed.   Constitutional:       General: He is not in acute distress.  HENT:      Head: Normocephalic and atraumatic.      Nose: Nose normal.      Mouth/Throat:      Mouth: Mucous membranes are moist.   Eyes:      Extraocular Movements: Extraocular movements intact.      Conjunctiva/sclera: Conjunctivae normal.      Pupils: Pupils are equal, round, and reactive to light.   Cardiovascular:      Rate and Rhythm: Normal rate and regular rhythm.   Pulmonary:      Breath sounds: Normal breath sounds.   Abdominal:      General: Bowel sounds are normal.      Palpations: Abdomen is soft.      Tenderness: There is no abdominal tenderness.   Musculoskeletal:         General: Normal range of motion.      Cervical back: Neck supple.   Skin:     General: Skin is warm and dry.   Neurological:      Mental Status: He is alert and oriented to person, place, and time.   Psychiatric:         Mood and Affect: Mood normal.         Pertinent  and/or Most Recent Results     Results from last 7 days   Lab Units 04/14/21  0511 04/13/21  0238   WBC 10*3/mm3 6.10 10.40    HEMOGLOBIN g/dL 10.7* 11.0*   HEMATOCRIT % 31.2* 32.0*   PLATELETS 10*3/mm3 123* 152   SODIUM mmol/L 137 135*   POTASSIUM mmol/L 4.2 3.9   CHLORIDE mmol/L 107 102   CO2 mmol/L 22.0 20.0*   BUN mg/dL 17 22   CREATININE mg/dL 0.88 0.79   GLUCOSE mg/dL 78 106*   CALCIUM mg/dL 8.5* 9.0     Results from last 7 days   Lab Units 04/14/21  0511 04/13/21 0238   BILIRUBIN mg/dL 2.5* 1.8*   ALK PHOS U/L 91 85   ALT (SGPT) U/L 20 21   AST (SGOT) U/L 47* 41*   PROTIME Seconds  --  15.6*   INR   --  1.44*   APTT seconds  --  33.6*           Invalid input(s): TG, LDLCALC, LDLREALC  Results from last 7 days   Lab Units 04/13/21  0301 04/13/21 0238   TSH uIU/mL  --  2.180   TROPONIN T ng/mL  --  <0.010   PROCALCITONIN ng/mL  --  0.12   LACTATE mmol/L 1.1  --        Brief Urine Lab Results  (Last result in the past 365 days)      Color   Clarity   Blood   Leuk Est   Nitrite   Protein   CREAT   Urine HCG        04/13/21 0251 Yellow Clear Small (1+) Negative Negative Negative               Microbiology Results Abnormal     Procedure Component Value - Date/Time    Blood Culture - Blood, Arm, Right [[id number]] Collected: 04/13/21 0251    Lab Status: Preliminary result Specimen: Blood from Arm, Right Updated: 04/14/21 0315     Blood Culture No growth at 24 hours    Blood Culture - Blood, Arm, Right [[id number]] Collected: 04/13/21 0258    Lab Status: Preliminary result Specimen: Blood from Arm, Right Updated: 04/14/21 0315     Blood Culture No growth at 24 hours    Legionella Antigen, Urine - Urine, Urine, Clean Catch [[id number]]  (Normal) Collected: 04/13/21 0251    Lab Status: Final result Specimen: Urine, Clean Catch Updated: 04/13/21 0928     LEGIONELLA ANTIGEN, URINE Negative    S. Pneumo Ag Urine or CSF - Urine, Urine, Clean Catch [[id number]]  (Normal) Collected: 04/13/21 0251    Lab Status: Final result Specimen: Urine, Clean Catch Updated: 04/13/21 0928     Strep Pneumo Ag Negative    Respiratory Panel PCR  w/COVID-19(SARS-CoV-2) JUAN/FATUMA/ELLIE/PAD/COR/MAD/CHAV[facility] In-House, NP Swab in UTM/VTM, 3-4 HR TAT - Swab, Nasopharynx [[id number]]  (Normal) Collected: 04/13/21 0337    Lab Status: Final result Specimen: Swab from Nasopharynx Updated: 04/13/21 0431     ADENOVIRUS, PCR Not Detected     Coronavirus 229E Not Detected     Coronavirus HKU1 Not Detected     Coronavirus NL63 Not Detected     Coronavirus OC43 Not Detected     COVID19 Not Detected     Human Metapneumovirus Not Detected     Human Rhinovirus/Enterovirus Not Detected     Influenza A PCR Not Detected     Influenza B PCR Not Detected     Parainfluenza Virus 1 Not Detected     Parainfluenza Virus 2 Not Detected     Parainfluenza Virus 3 Not Detected     Parainfluenza Virus 4 Not Detected     RSV, PCR Not Detected     Bordetella pertussis pcr Not Detected     Bordetella parapertussis PCR Not Detected     Chlamydophila pneumoniae PCR Not Detected     Mycoplasma pneumo by PCR Not Detected    Narrative:      Fact sheet for providers: https://docs.[employer]/wp-content/uploads/OGA3390-8010-KI0.1-EUA-Provider-Fact-Sheet-3.pdf    Fact sheet for patients: https://docs.[employer]/wp-content/uploads/RQO0907-5245-EE8.1-EUA-Patient-Fact-Sheet-1.pdf    Test performed by PCR.          CT Head Without Contrast    Result Date: 4/13/2021  Impression: 1. No acute intracranial process. MRI is more sensitive for the detection of acute nonhemorrhagic infarct. 2. Mild changes small vessel ischemic disease of indeterminate age, presumably mostly chronic. Volume loss. Atherosclerosis. 3. Paranasal sinus disease. Electronically signed by:  Mahad Preston M.D.  4/13/2021 2:12 AM    XR Chest 1 View    Result Date: 4/13/2021  Impression: Similar-appearing left basilar opacity, consistent with a small pleural effusion and underlying atelectasis, pneumonia, and/or scarring.  Electronically Signed By-Uzair De Los Santos MD On:4/13/2021 7:29 AM This report was finalized on [id number] by  Uzair  MD Magali.      Results for orders placed during the hospital encounter of 02/25/21    Duplex Venous Lower Extremity - Right CAR    Interpretation Summary  · Normal right lower extremity venous duplex scan.      Results for orders placed during the hospital encounter of 02/25/21    Duplex Venous Lower Extremity - Right CAR    Interpretation Summary  · Normal right lower extremity venous duplex scan.      Results for orders placed during the hospital encounter of 01/10/21    Adult Transthoracic Echo Complete W/ Cont if Necessary Per Protocol    Interpretation Summary  · Left ventricular ejection fraction appears to be 61 - 65%.  · Left ventricular diastolic function is consistent with (grade I) impaired relaxation.  · The right ventricular cavity is mild to moderately dilated.  · Typically difficult study with limited acoustical windows; limited M-mode measurements and limited Doppler assessment  · No specific Doppler abnormalities appreciated on limited study; normal RV systolic pressure              Test Results Pending at Discharge  Pending Labs     Order Current Status    Blood Culture - Blood, Arm, Right Preliminary result    Blood Culture - Blood, Arm, Right Preliminary result            Procedures Performed           Consults:   Consults     No orders found for last 30 day(s).            Discharge Details        Discharge Medications      New Medications      Instructions Start Date   amoxicillin-clavulanate 875-125 MG per tablet  Commonly known as: AUGMENTIN   1 tablet, Oral, Every 12 Hours Scheduled      lactulose 10 GM/15ML solution  Commonly known as: CHRONULAC   20 g, Oral, 2 Times Daily         Continue These Medications      Instructions Start Date   HYDROcodone-acetaminophen 5-325 MG per tablet  Commonly known as: NORCO   1 tablet, Oral, Every 6 Hours PRN      midodrine 2.5 MG tablet  Commonly known as: PROAMATINE   2.5 mg, Oral, 3 Times Daily Before Meals      spironolactone 25 MG tablet  Commonly  known as: ALDACTONE   75 mg, Oral, Daily      tamsulosin 0.4 MG capsule 24 hr capsule  Commonly known as: FLOMAX   0.4 mg, Oral, Daily      Thiamine Mononitrate 100 MG tablet   100 mg, Oral, Daily      torsemide 20 MG tablet  Commonly known as: DEMADEX   20 mg, Oral, Daily             Allergies   Allergen Reactions   • Sulfa Antibiotics Unknown - High Severity         Discharge Disposition:  Home or Self Care    Diet:  Hospital:  Diet Order   Procedures   • Diet Texture; Mechanical Ground; No Mixed Consistencies         Discharge Activity:   Activity Instructions     Activity as Tolerated              CODE STATUS:    Code Status and Medical Interventions:   Ordered at: 04/13/21 0751     Code Status:    CPR     Medical Interventions (Level of Support Prior to Arrest):    Full         Follow-up Appointments  No future appointments.    Additional Instructions for the Follow-ups that You Need to Schedule     Ambulatory Referral to Home Health   As directed      Face to Face Visit Date: 4/13/2021    Follow-up provider for Plan of Care?: I treated the patient in an acute care facility and will not continue treatment after discharge.    Follow-up provider: SERAFIN MCFARLAND [[id number]]    Reason/Clinical Findings: post hospital evaluation    Describe mobility limitations that make leaving home difficult: weakness    Nursing/Therapeutic Services Requested: Other (MYRANDA order )    Frequency: 1 Week 1         Discharge Follow-up with PCP   As directed       Currently Documented PCP:    Serafin Mcfarland APRN    PCP Phone Number:    [phone]     Follow Up Details: Follow-up with PCP as needed                 Condition on Discharge:      Stable      This patient has been examined with appropriate PPE . 04/14/21      Electronically signed by Sergio Choudhary MD, 04/14/21, 8:41 AM EDT.      Time: I spent 33 minutes on this discharge activity which included face-to-face encounter with the patient/reviewing the data in the  system/coordination of the care with the nursing staff as well as consultants/documentation/entering orders.

## 2021-04-14 NOTE — PLAN OF CARE
Problem: Adult Inpatient Plan of Care  Goal: Plan of Care Review  Outcome: Met   Goal Outcome Evaluation:         Pt being discharged home. D/c education provided.

## 2021-04-14 NOTE — PROGRESS NOTES
Continued Stay Note   [place]     Patient Name: Eddie Mckay  MRN: [id number]  Today's Date: 4/14/2021    Admit Date: 4/13/2021    Discharge Plan     Row Name 04/14/21 1514       Plan    Plan Comments  Did not qualify for home oxygen    Final Discharge Disposition Code  06 - home with home health care    Final Note  [facility]              Leeanna Mcfarlane RN

## 2021-04-15 ENCOUNTER — READMISSION MANAGEMENT (OUTPATIENT)
Dept: CALL CENTER | Facility: HOSPITAL | Age: 66
End: 2021-04-15

## 2021-04-15 NOTE — OUTREACH NOTE
Prep Survey      Responses   Rel[facility] facility patient discharged from?  [facility]   Is LACE score < 7 ?  No   Emergency Room discharge w/ pulse ox?  No   Eligibility  Readm Mgmt   Discharge diagnosis  Aspiration pneumonitis   Does the patient have one of the following disease processes/diagnoses(primary or secondary)?  Other   Does the patient have Home health ordered?  Yes   What is the Home health agency?   [facility]    Is there a DME ordered?  No   Comments regarding appointments  needs f/u scheduled   Medication alerts for this patient  Per AVS   Prep survey completed?  Yes          Marti Green RN

## 2021-04-16 ENCOUNTER — READMISSION MANAGEMENT (OUTPATIENT)
Dept: CALL CENTER | Facility: HOSPITAL | Age: 66
End: 2021-04-16

## 2021-04-16 NOTE — OUTREACH NOTE
Medical Week 1 Survey      Responses   [facility] patient discharged from?  [place]   Does the patient have one of the following disease processes/diagnoses(primary or secondary)?  Other   Week 1 attempt successful?  Yes   Call start time  1404   Call end time  1406   Discharge diagnosis  Aspiration pneumonitis   Is patient permission given to speak with other caregiver?  Yes   List who call center can speak with  LIO NEUMANN   Person spoke with today (if not patient) and relationship  LIO NEUMANN- DAUGHTER   Meds reviewed with patient/caregiver?  Yes   Is the patient having any side effects they believe may be caused by any medication additions or changes?  No   Does the patient have all medications ordered at discharge?  Yes   Is the patient taking all medications as directed (includes completed medication regime)?  Yes   Does the patient have a primary care provider?   Yes   Does the patient have an appointment with their PCP within 7 days of discharge?  Greater than 7 days   Comments regarding PCP  PCP IS THROUGH [facility] AND DAUGHTER STATES HIS PROVIDER WILL SEE HIM IN A COUPLE WEEKS.    What is preventing the patient from scheduling follow up appointments within 7 days of discharge?  Earlier appointment not available   Nursing Interventions  Verified appointment date/time/provider   Has the patient kept scheduled appointments due by today?  N/A   What is the Home health agency?   [facility]    Has home health visited the patient within 72 hours of discharge?  Yes   Psychosocial issues?  No   Did the patient receive a copy of their discharge instructions?  Yes   Nursing interventions  Reviewed instructions with patient   What is the patient's perception of their health status since discharge?  Improving   Is the patient/caregiver able to teach back signs and symptoms related to disease process for when to call PCP?  Yes   Is the patient/caregiver able to teach back  signs and symptoms related to disease process for when to call 911?  Yes   Is the patient/caregiver able to teach back the hierarchy of who to call/visit for symptoms/problems? PCP, Specialist, Home health nurse, Urgent Care, ED, 911  Yes   Week 1 call completed?  Yes          Chantale Kincaid LPN

## 2021-04-18 LAB
BACTERIA SPEC AEROBE CULT: NORMAL
BACTERIA SPEC AEROBE CULT: NORMAL

## 2021-04-23 ENCOUNTER — READMISSION MANAGEMENT (OUTPATIENT)
Dept: CALL CENTER | Facility: HOSPITAL | Age: 66
End: 2021-04-23

## 2021-04-23 NOTE — OUTREACH NOTE
Medical Week 2 Survey      Responses   [facility] patient discharged from?  [place]   Does the patient have one of the following disease processes/diagnoses(primary or secondary)?  Other   Week 2 attempt successful?  Yes   Call start time  1555   Call end time  1557   Person spoke with today (if not patient) and relationship  LIO NEUMANN- DAUGHTER   Has the patient kept scheduled appointments due by today?  N/A   Comments  Follow up appt next week   What is the patient's perception of their health status since discharge?  Improving   Week 2 Call Completed?  Yes   Wrap up additional comments  Brief call, daughter states that he is fine and has a follow up next week.          Tarsha Valdez, RN

## 2021-04-29 ENCOUNTER — READMISSION MANAGEMENT (OUTPATIENT)
Dept: CALL CENTER | Facility: HOSPITAL | Age: 66
End: 2021-04-29

## 2021-04-29 NOTE — OUTREACH NOTE
Medical Week 3 Survey      Responses   [facility] patient discharged from?  [place]   Does the patient have one of the following disease processes/diagnoses(primary or secondary)?  Other   Week 3 attempt successful?  No   Unsuccessful attempts  Attempt 1          Camryn Gutierrez RN

## 2021-05-04 ENCOUNTER — READMISSION MANAGEMENT (OUTPATIENT)
Dept: CALL CENTER | Facility: HOSPITAL | Age: 66
End: 2021-05-04

## 2021-05-04 NOTE — OUTREACH NOTE
Medical Week 3 Survey      Responses   [facility] patient discharged from?  [place]   Does the patient have one of the following disease processes/diagnoses(primary or secondary)?  Other   Week 3 attempt successful?  No   Unsuccessful attempts  Attempt 2          Sydney Rushing LPN

## 2021-09-19 NOTE — PLAN OF CARE
Goal Outcome Evaluation:  Plan of Care Reviewed With: patient  Progress: no change  Outcome Summary: 67 y/o M who presented following fall with resultant R hip fracture now POD 1 R ant NAHOMY. Pt is WBAT. He typically is independent with ADLs and ambulates without AD. He does not drive- daughter drives as needed. This date he tolerated mobility well. performed bed mobility with SBA, transfers with min A, ambulation with min A and RW 25'. He tolerated ther ex well. Anticipate he will be safe to d/c home with HHPT. His daughter plans to temporarily move in with him to help him as needed. PPE: mask, goggles, gloves.   hard copy, drawn during this pregnancy

## (undated) DEVICE — C-ARM: Brand: UNBRANDED

## (undated) DEVICE — GLV SURG NEOLON 2G PF LF 6.5 STRL

## (undated) DEVICE — CONTROL SYRINGE LUER-LOCK TIP: Brand: MONOJECT

## (undated) DEVICE — SOL ISO/ALC RUB 91PCT 16OZ

## (undated) DEVICE — DRAPE SHEET ULTRAGARD: Brand: MEDLINE

## (undated) DEVICE — PENCL EVAC ULTRAVAC SMOKE W/BLD

## (undated) DEVICE — Device

## (undated) DEVICE — S/M FLEXIBLE ALEXIS ORTHOPAEDIC PROTECTOR: Brand: ALEXIS® ORTHOPAEDIC PROTECTOR

## (undated) DEVICE — RECIPROCATING BLADE HEAVY DUTY LONG, OFFSET  (77.6 X 0.77 X 11.2MM)

## (undated) DEVICE — SUT VIC 1 CT1 36IN J947H

## (undated) DEVICE — GLV SURG DERMASSURE GRN LF PF 8.5

## (undated) DEVICE — ZIPPERED TOGA, 2X LARGE: Brand: FLYTE

## (undated) DEVICE — PACK,UNIVERSAL,NO GOWNS: Brand: MEDLINE

## (undated) DEVICE — HANDPIECE SET WITH COAXIAL MULTI-ORIFICE TIP AND SUCTION TUBE: Brand: INTERPULSE

## (undated) DEVICE — KT SURG TURNOVER 050

## (undated) DEVICE — GLV SURG SENSICARE PI PF LF 7 GRN STRL

## (undated) DEVICE — GLV SURG SENSICARE PI ORTHO SZ8.5 LF STRL

## (undated) DEVICE — GLV SURG SENSICARE PI ORTHO PF SZ7 LF STRL

## (undated) DEVICE — NEEDLE, QUINCKE, 20GX3.5": Brand: MEDLINE

## (undated) DEVICE — 6617 IOBAN II PATIENT ISOLATION DRAPE 5/BX,4BX/CS: Brand: STERI-DRAPE™ IOBAN™ 2

## (undated) DEVICE — ELECTRD BLD EZ CLN STD 6.5IN

## (undated) DEVICE — KT PT POSITION SUPINE HANA/PROFX TABL

## (undated) DEVICE — APPL CHLORAPREP HI/LITE 26ML ORNG

## (undated) DEVICE — PK TOTL HIP 50

## (undated) DEVICE — SUT ETHIB 2 CV V37 MS/4 30IN MX69G